# Patient Record
Sex: MALE | Race: WHITE | NOT HISPANIC OR LATINO
[De-identification: names, ages, dates, MRNs, and addresses within clinical notes are randomized per-mention and may not be internally consistent; named-entity substitution may affect disease eponyms.]

---

## 2017-02-02 ENCOUNTER — APPOINTMENT (OUTPATIENT)
Dept: SURGICAL ONCOLOGY | Facility: CLINIC | Age: 72
End: 2017-02-02

## 2017-02-02 VITALS
HEIGHT: 71 IN | WEIGHT: 182 LBS | HEART RATE: 59 BPM | DIASTOLIC BLOOD PRESSURE: 78 MMHG | RESPIRATION RATE: 16 BRPM | BODY MASS INDEX: 25.48 KG/M2 | SYSTOLIC BLOOD PRESSURE: 127 MMHG

## 2017-02-02 DIAGNOSIS — Z85.820 ENCOUNTER FOR FOLLOW-UP EXAMINATION AFTER COMPLETED TREATMENT FOR MALIGNANT NEOPLASM: ICD-10-CM

## 2017-02-02 DIAGNOSIS — Z08 ENCOUNTER FOR FOLLOW-UP EXAMINATION AFTER COMPLETED TREATMENT FOR MALIGNANT NEOPLASM: ICD-10-CM

## 2017-05-11 ENCOUNTER — APPOINTMENT (OUTPATIENT)
Dept: SURGICAL ONCOLOGY | Facility: CLINIC | Age: 72
End: 2017-05-11

## 2017-05-11 VITALS
SYSTOLIC BLOOD PRESSURE: 126 MMHG | WEIGHT: 182 LBS | BODY MASS INDEX: 25.48 KG/M2 | DIASTOLIC BLOOD PRESSURE: 79 MMHG | HEIGHT: 71 IN

## 2017-07-16 ENCOUNTER — FORM ENCOUNTER (OUTPATIENT)
Age: 72
End: 2017-07-16

## 2017-07-17 ENCOUNTER — APPOINTMENT (OUTPATIENT)
Dept: RADIOLOGY | Facility: IMAGING CENTER | Age: 72
End: 2017-07-17

## 2017-07-17 ENCOUNTER — OUTPATIENT (OUTPATIENT)
Dept: OUTPATIENT SERVICES | Facility: HOSPITAL | Age: 72
LOS: 1 days | End: 2017-07-17
Payer: MEDICARE

## 2017-07-17 DIAGNOSIS — C43.61 MALIGNANT MELANOMA OF RIGHT UPPER LIMB, INCLUDING SHOULDER: ICD-10-CM

## 2017-07-17 PROCEDURE — 71046 X-RAY EXAM CHEST 2 VIEWS: CPT

## 2017-10-12 ENCOUNTER — APPOINTMENT (OUTPATIENT)
Dept: SURGICAL ONCOLOGY | Facility: CLINIC | Age: 72
End: 2017-10-12

## 2018-02-09 ENCOUNTER — APPOINTMENT (OUTPATIENT)
Dept: PSYCHIATRY | Facility: CLINIC | Age: 73
End: 2018-02-09
Payer: MEDICARE

## 2018-02-09 PROCEDURE — 99205 OFFICE O/P NEW HI 60 MIN: CPT

## 2018-02-09 RX ORDER — ROSUVASTATIN CALCIUM 5 MG/1
5 TABLET, FILM COATED ORAL
Qty: 30 | Refills: 0 | Status: DISCONTINUED | COMMUNITY
Start: 2016-10-28

## 2018-02-09 RX ORDER — SODIUM SULFATE, POTASSIUM SULFATE, MAGNESIUM SULFATE 17.5; 3.13; 1.6 G/ML; G/ML; G/ML
17.5-3.13-1.6 SOLUTION, CONCENTRATE ORAL
Qty: 354 | Refills: 0 | Status: DISCONTINUED | COMMUNITY
Start: 2017-07-17

## 2018-02-09 RX ORDER — METOPROLOL SUCCINATE 50 MG/1
50 TABLET, EXTENDED RELEASE ORAL
Qty: 90 | Refills: 0 | Status: DISCONTINUED | COMMUNITY
Start: 2017-05-03

## 2018-02-09 RX ORDER — OSELTAMIVIR PHOSPHATE 75 MG/1
75 CAPSULE ORAL
Qty: 10 | Refills: 0 | Status: DISCONTINUED | COMMUNITY
Start: 2018-01-10

## 2018-02-09 RX ORDER — CIPROFLOXACIN HYDROCHLORIDE 500 MG/1
500 TABLET, FILM COATED ORAL
Qty: 20 | Refills: 0 | Status: DISCONTINUED | COMMUNITY
Start: 2018-01-19

## 2018-02-09 RX ORDER — MOMETASONE FUROATE 1 MG/ML
0.1 SOLUTION TOPICAL
Qty: 60 | Refills: 0 | Status: DISCONTINUED | COMMUNITY
Start: 2017-10-26

## 2018-02-27 ENCOUNTER — APPOINTMENT (OUTPATIENT)
Dept: PSYCHIATRY | Facility: CLINIC | Age: 73
End: 2018-02-27
Payer: MEDICARE

## 2018-02-27 PROCEDURE — 99214 OFFICE O/P EST MOD 30 MIN: CPT

## 2018-03-05 ENCOUNTER — RX RENEWAL (OUTPATIENT)
Age: 73
End: 2018-03-05

## 2018-03-06 ENCOUNTER — APPOINTMENT (OUTPATIENT)
Dept: PSYCHIATRY | Facility: CLINIC | Age: 73
End: 2018-03-06
Payer: MEDICARE

## 2018-03-06 PROCEDURE — 99214 OFFICE O/P EST MOD 30 MIN: CPT

## 2018-03-06 RX ORDER — DULOXETINE HYDROCHLORIDE 20 MG/1
20 CAPSULE, DELAYED RELEASE PELLETS ORAL
Qty: 90 | Refills: 0 | Status: DISCONTINUED | COMMUNITY
Start: 2018-02-09

## 2018-03-13 ENCOUNTER — APPOINTMENT (OUTPATIENT)
Dept: PSYCHIATRY | Facility: CLINIC | Age: 73
End: 2018-03-13

## 2018-03-13 RX ORDER — DULOXETINE HYDROCHLORIDE 60 MG/1
60 CAPSULE, DELAYED RELEASE PELLETS ORAL
Qty: 30 | Refills: 0 | Status: DISCONTINUED | COMMUNITY
Start: 2018-02-09 | End: 2018-03-13

## 2018-03-14 ENCOUNTER — APPOINTMENT (OUTPATIENT)
Dept: PSYCHIATRY | Facility: CLINIC | Age: 73
End: 2018-03-14
Payer: MEDICARE

## 2018-03-14 PROCEDURE — 99214 OFFICE O/P EST MOD 30 MIN: CPT

## 2018-03-14 RX ORDER — DULOXETINE HYDROCHLORIDE 20 MG/1
20 CAPSULE, DELAYED RELEASE PELLETS ORAL
Qty: 30 | Refills: 0 | Status: DISCONTINUED | COMMUNITY
Start: 2018-03-06 | End: 2018-03-14

## 2018-03-29 ENCOUNTER — APPOINTMENT (OUTPATIENT)
Dept: PSYCHIATRY | Facility: CLINIC | Age: 73
End: 2018-03-29
Payer: MEDICARE

## 2018-03-29 PROCEDURE — 99214 OFFICE O/P EST MOD 30 MIN: CPT

## 2018-04-17 ENCOUNTER — APPOINTMENT (OUTPATIENT)
Dept: SURGICAL ONCOLOGY | Facility: CLINIC | Age: 73
End: 2018-04-17
Payer: MEDICARE

## 2018-04-17 VITALS
WEIGHT: 176 LBS | DIASTOLIC BLOOD PRESSURE: 84 MMHG | SYSTOLIC BLOOD PRESSURE: 146 MMHG | OXYGEN SATURATION: 98 % | BODY MASS INDEX: 24.64 KG/M2 | HEART RATE: 70 BPM | HEIGHT: 71 IN

## 2018-04-17 PROCEDURE — 99214 OFFICE O/P EST MOD 30 MIN: CPT

## 2018-04-27 ENCOUNTER — RESULT REVIEW (OUTPATIENT)
Age: 73
End: 2018-04-27

## 2018-04-27 ENCOUNTER — OUTPATIENT (OUTPATIENT)
Dept: OUTPATIENT SERVICES | Facility: HOSPITAL | Age: 73
LOS: 1 days | End: 2018-04-27
Payer: MEDICARE

## 2018-04-27 DIAGNOSIS — C43.61 MALIGNANT MELANOMA OF RIGHT UPPER LIMB, INCLUDING SHOULDER: ICD-10-CM

## 2018-04-27 PROCEDURE — 88321 CONSLTJ&REPRT SLD PREP ELSWR: CPT

## 2018-04-30 LAB — SURGICAL PATHOLOGY STUDY: SIGNIFICANT CHANGE UP

## 2018-05-07 ENCOUNTER — OUTPATIENT (OUTPATIENT)
Dept: OUTPATIENT SERVICES | Facility: HOSPITAL | Age: 73
LOS: 1 days | End: 2018-05-07
Payer: MEDICARE

## 2018-05-07 VITALS
SYSTOLIC BLOOD PRESSURE: 107 MMHG | HEIGHT: 69.5 IN | TEMPERATURE: 98 F | DIASTOLIC BLOOD PRESSURE: 80 MMHG | WEIGHT: 173.06 LBS | HEART RATE: 68 BPM | RESPIRATION RATE: 18 BRPM

## 2018-05-07 DIAGNOSIS — C43.61 MALIGNANT MELANOMA OF RIGHT UPPER LIMB, INCLUDING SHOULDER: ICD-10-CM

## 2018-05-07 DIAGNOSIS — Z98.890 OTHER SPECIFIED POSTPROCEDURAL STATES: Chronic | ICD-10-CM

## 2018-05-07 DIAGNOSIS — R94.31 ABNORMAL ELECTROCARDIOGRAM [ECG] [EKG]: ICD-10-CM

## 2018-05-07 DIAGNOSIS — R06.83 SNORING: ICD-10-CM

## 2018-05-07 DIAGNOSIS — I10 ESSENTIAL (PRIMARY) HYPERTENSION: ICD-10-CM

## 2018-05-07 LAB
BLD GP AB SCN SERPL QL: NEGATIVE — SIGNIFICANT CHANGE UP
BUN SERPL-MCNC: 17 MG/DL — SIGNIFICANT CHANGE UP (ref 7–23)
CALCIUM SERPL-MCNC: 8.9 MG/DL — SIGNIFICANT CHANGE UP (ref 8.4–10.5)
CHLORIDE SERPL-SCNC: 103 MMOL/L — SIGNIFICANT CHANGE UP (ref 98–107)
CO2 SERPL-SCNC: 30 MMOL/L — SIGNIFICANT CHANGE UP (ref 22–31)
CREAT SERPL-MCNC: 0.81 MG/DL — SIGNIFICANT CHANGE UP (ref 0.5–1.3)
GLUCOSE SERPL-MCNC: 77 MG/DL — SIGNIFICANT CHANGE UP (ref 70–99)
HCT VFR BLD CALC: 40.3 % — SIGNIFICANT CHANGE UP (ref 39–50)
HGB BLD-MCNC: 14.2 G/DL — SIGNIFICANT CHANGE UP (ref 13–17)
MCHC RBC-ENTMCNC: 33.1 PG — SIGNIFICANT CHANGE UP (ref 27–34)
MCHC RBC-ENTMCNC: 35.2 % — SIGNIFICANT CHANGE UP (ref 32–36)
MCV RBC AUTO: 93.9 FL — SIGNIFICANT CHANGE UP (ref 80–100)
NRBC # FLD: 0 — SIGNIFICANT CHANGE UP
PLATELET # BLD AUTO: 219 K/UL — SIGNIFICANT CHANGE UP (ref 150–400)
PMV BLD: 9.9 FL — SIGNIFICANT CHANGE UP (ref 7–13)
POTASSIUM SERPL-MCNC: 4.4 MMOL/L — SIGNIFICANT CHANGE UP (ref 3.5–5.3)
POTASSIUM SERPL-SCNC: 4.4 MMOL/L — SIGNIFICANT CHANGE UP (ref 3.5–5.3)
RBC # BLD: 4.29 M/UL — SIGNIFICANT CHANGE UP (ref 4.2–5.8)
RBC # FLD: 14.6 % — HIGH (ref 10.3–14.5)
RH IG SCN BLD-IMP: POSITIVE — SIGNIFICANT CHANGE UP
SODIUM SERPL-SCNC: 144 MMOL/L — SIGNIFICANT CHANGE UP (ref 135–145)
WBC # BLD: 5.32 K/UL — SIGNIFICANT CHANGE UP (ref 3.8–10.5)
WBC # FLD AUTO: 5.32 K/UL — SIGNIFICANT CHANGE UP (ref 3.8–10.5)

## 2018-05-07 PROCEDURE — 93010 ELECTROCARDIOGRAM REPORT: CPT

## 2018-05-07 RX ORDER — SODIUM CHLORIDE 9 MG/ML
1000 INJECTION, SOLUTION INTRAVENOUS
Qty: 0 | Refills: 0 | Status: DISCONTINUED | OUTPATIENT
Start: 2018-05-09 | End: 2018-05-24

## 2018-05-07 RX ORDER — SODIUM CHLORIDE 9 MG/ML
3 INJECTION INTRAMUSCULAR; INTRAVENOUS; SUBCUTANEOUS EVERY 8 HOURS
Qty: 0 | Refills: 0 | Status: DISCONTINUED | OUTPATIENT
Start: 2018-05-09 | End: 2018-05-24

## 2018-05-07 NOTE — H&P PST ADULT - PSH
H/O melanoma excision  right shoulder in 2015  H/O needle biopsy  of prostate  S/P wrist surgery  left wrist, 30 years ago with hardware placement and excision of hardware months later

## 2018-05-07 NOTE — H&P PST ADULT - HISTORY OF PRESENT ILLNESS
72 y/ male with PMH of HTN, HLD and Situational Anxiety presents to PST for preoperative evaluation with dx of malignant melanoma of right upper limb, including shoulder. s/p resection of right shoulder melanoma in 2015. During a follow up visit in April, a lesion was noted next to the previous excision site. s/p biopsy which revealed recurrence of melanoma. Scheduled for Wide Excision Melanoma Right Shoulder, Right [place] Lymph Node Biopsy on 5/9/2018. 72 y/ male with PMH of HTN, HLD and Situational Anxiety presents to PST for preoperative evaluation with dx of malignant melanoma of right upper limb, including shoulder. s/p resection of right shoulder melanoma in 2015. During a follow up visit in April, a lesion was noted next to the previous excision site. s/p biopsy which revealed recurrence of malignant melanoma. Scheduled for Wide Excision Melanoma Right Shoulder, Right [place] Lymph Node Biopsy on 5/9/2018.

## 2018-05-07 NOTE — H&P PST ADULT - PROBLEM SELECTOR PLAN 1
Scheduled for Wide Excision Melanoma Right Shoulder, Right [place] Lymph Node Biopsy on 5/9/2018.   Preop instructions given, pt verbalized understanding   Chlorhexidine wash and GI prophylaxis provided

## 2018-05-07 NOTE — H&P PST ADULT - PMH
Basal cell carcinoma  right back  Former smoker    HLD (hyperlipidemia)    HTN (hypertension)    Malignant melanoma of right upper limb, including shoulder    Situational anxiety

## 2018-05-07 NOTE — H&P PST ADULT - NEGATIVE CARDIOVASCULAR SYMPTOMS
no palpitations/no orthopnea/no chest pain/no peripheral edema/no paroxysmal nocturnal dyspnea/no claudication/no dyspnea on exertion

## 2018-05-07 NOTE — H&P PST ADULT - NEGATIVE ENMT SYMPTOMS
no hearing difficulty/no dysphagia/no nose bleeds/no gum bleeding/no ear pain/no tinnitus/no vertigo/no post-nasal discharge

## 2018-05-07 NOTE — H&P PST ADULT - FAMILY HISTORY
Father  Still living? Unknown  Family history of heart attack, Age at diagnosis: Age Unknown     Mother  Still living? No  Family history of renal failure, Age at diagnosis: Age Unknown

## 2018-05-07 NOTE — H&P PST ADULT - NEGATIVE GENERAL SYMPTOMS
no weight gain/no polyphagia/no polyuria/no fever/no weight loss/no polydipsia/no fatigue/no sweating/no chills

## 2018-05-07 NOTE — H&P PST ADULT - NSANTHOSAYNRD_GEN_A_CORE
No. OLIVER screening performed.  STOP BANG Legend: 0-2 = LOW Risk; 3-4 = INTERMEDIATE Risk; 5-8 = HIGH Risk

## 2018-05-08 ENCOUNTER — FORM ENCOUNTER (OUTPATIENT)
Age: 73
End: 2018-05-08

## 2018-05-09 ENCOUNTER — OUTPATIENT (OUTPATIENT)
Dept: OUTPATIENT SERVICES | Facility: HOSPITAL | Age: 73
LOS: 1 days | Discharge: HOME | End: 2018-05-09
Payer: MEDICARE

## 2018-05-09 ENCOUNTER — RESULT REVIEW (OUTPATIENT)
Age: 73
End: 2018-05-09

## 2018-05-09 ENCOUNTER — APPOINTMENT (OUTPATIENT)
Dept: NUCLEAR MEDICINE | Facility: HOSPITAL | Age: 73
End: 2018-05-09

## 2018-05-09 ENCOUNTER — APPOINTMENT (OUTPATIENT)
Dept: SURGICAL ONCOLOGY | Facility: HOSPITAL | Age: 73
End: 2018-05-09

## 2018-05-09 VITALS
HEART RATE: 76 BPM | DIASTOLIC BLOOD PRESSURE: 72 MMHG | OXYGEN SATURATION: 98 % | RESPIRATION RATE: 14 BRPM | TEMPERATURE: 97 F | SYSTOLIC BLOOD PRESSURE: 139 MMHG

## 2018-05-09 VITALS
DIASTOLIC BLOOD PRESSURE: 67 MMHG | TEMPERATURE: 98 F | OXYGEN SATURATION: 97 % | HEART RATE: 68 BPM | SYSTOLIC BLOOD PRESSURE: 148 MMHG | WEIGHT: 173.06 LBS | HEIGHT: 69.5 IN | RESPIRATION RATE: 16 BRPM

## 2018-05-09 DIAGNOSIS — C43.61 MALIGNANT MELANOMA OF RIGHT UPPER LIMB, INCLUDING SHOULDER: ICD-10-CM

## 2018-05-09 DIAGNOSIS — Z98.890 OTHER SPECIFIED POSTPROCEDURAL STATES: Chronic | ICD-10-CM

## 2018-05-09 LAB — RH IG SCN BLD-IMP: POSITIVE — SIGNIFICANT CHANGE UP

## 2018-05-09 PROCEDURE — 88307 TISSUE EXAM BY PATHOLOGIST: CPT | Mod: 26

## 2018-05-09 PROCEDURE — 78195 LYMPH SYSTEM IMAGING: CPT | Mod: 26

## 2018-05-09 PROCEDURE — 88341 IMHCHEM/IMCYTCHM EA ADD ANTB: CPT | Mod: 26

## 2018-05-09 PROCEDURE — 88305 TISSUE EXAM BY PATHOLOGIST: CPT | Mod: 26

## 2018-05-09 PROCEDURE — 11606 EXC TR-EXT MAL+MARG >4 CM: CPT

## 2018-05-09 PROCEDURE — 88342 IMHCHEM/IMCYTCHM 1ST ANTB: CPT | Mod: 26

## 2018-05-09 PROCEDURE — 38525 BIOPSY/REMOVAL LYMPH NODES: CPT

## 2018-05-09 RX ORDER — CEPHALEXIN 500 MG
1 CAPSULE ORAL
Qty: 14 | Refills: 0
Start: 2018-05-09 | End: 2018-05-15

## 2018-05-09 RX ORDER — FENTANYL CITRATE 50 UG/ML
25 INJECTION INTRAVENOUS
Qty: 0 | Refills: 0 | Status: DISCONTINUED | OUTPATIENT
Start: 2018-05-09 | End: 2018-05-09

## 2018-05-09 RX ORDER — SODIUM CHLORIDE 9 MG/ML
500 INJECTION, SOLUTION INTRAVENOUS ONCE
Qty: 0 | Refills: 0 | Status: COMPLETED | OUTPATIENT
Start: 2018-05-09 | End: 2018-05-09

## 2018-05-09 RX ORDER — ONDANSETRON 8 MG/1
4 TABLET, FILM COATED ORAL ONCE
Qty: 0 | Refills: 0 | Status: COMPLETED | OUTPATIENT
Start: 2018-05-09 | End: 2018-05-09

## 2018-05-09 RX ORDER — DEXAMETHASONE 0.5 MG/5ML
6 ELIXIR ORAL ONCE
Qty: 0 | Refills: 0 | Status: COMPLETED | OUTPATIENT
Start: 2018-05-09 | End: 2018-05-09

## 2018-05-09 RX ORDER — ACETAMINOPHEN 500 MG
1000 TABLET ORAL ONCE
Qty: 0 | Refills: 0 | Status: COMPLETED | OUTPATIENT
Start: 2018-05-09 | End: 2018-05-09

## 2018-05-09 RX ADMIN — Medication 400 MILLIGRAM(S): at 15:35

## 2018-05-09 RX ADMIN — Medication 1000 MILLIGRAM(S): at 15:49

## 2018-05-09 RX ADMIN — SODIUM CHLORIDE 3000 MILLILITER(S): 9 INJECTION, SOLUTION INTRAVENOUS at 17:05

## 2018-05-09 RX ADMIN — ONDANSETRON 4 MILLIGRAM(S): 8 TABLET, FILM COATED ORAL at 16:37

## 2018-05-09 RX ADMIN — Medication 6 MILLIGRAM(S): at 16:04

## 2018-05-09 RX ADMIN — SODIUM CHLORIDE 75 MILLILITER(S): 9 INJECTION, SOLUTION INTRAVENOUS at 15:36

## 2018-05-09 NOTE — BRIEF OPERATIVE NOTE - OPERATION/FINDINGS
Right axillary sentinel lymph node biopsy performed. Right shoulder melanoma excision. Reconstruction by plastics.
See gen surg note for excision portion. Reconstruction of R shoulder melanoma excision site with split thickness skin graft from R thigh to R shoulder.

## 2018-05-09 NOTE — ASU DISCHARGE PLAN (ADULT/PEDIATRIC). - NOTIFY
Bleeding that does not stop/Swelling that continues Bleeding that does not stop/Swelling that continues/Persistent Nausea and Vomiting/Pain not relieved by Medications/Fever greater than 101

## 2018-05-09 NOTE — BRIEF OPERATIVE NOTE - PROCEDURE
<<-----Click on this checkbox to enter Procedure Excision of melanoma of shoulder  05/09/2018  R  Active  [id number] Biopsy of sentinel axillary lymph node  05/09/2018  R  Active  [id number]

## 2018-05-09 NOTE — ASU DISCHARGE PLAN (ADULT/PEDIATRIC). - MEDICATION SUMMARY - MEDICATIONS TO TAKE
I will START or STAY ON the medications listed below when I get home from the hospital:    Vitamin D3  -- 1 tab(s) by mouth once a day am  -- Indication: For supplement    oxyCODONE-acetaminophen 5 mg-325 mg oral tablet  -- 1 tab(s) by mouth every 4 hours, As Needed  -for moderate pain MDD:6   -- Caution federal law prohibits the transfer of this drug to any person other  than the person for whom it was prescribed.  May cause drowsiness.  Alcohol may intensify this effect.  Use care when operating dangerous machinery.  This prescription cannot be refilled.  This product contains acetaminophen.  Do not use  with any other product containing acetaminophen to prevent possible liver damage.  Using more of this medication than prescribed may cause serious breathing problems.    -- Indication: For pain    aspirin 81 mg oral tablet  -- 1 tab(s) by mouth once a day am 5/2/18  -- Indication: For anti-platelet    KlonoPIN 0.5 mg oral tablet  -- orally 2 times a day  -- Indication: For anxiety    Remeron 45 mg oral tablet  -- 1 tab(s) by mouth once a day (at bedtime)  -- Indication: For depression/sleep aid/appetite aid    Crestor 5 mg oral tablet  -- 1 tab(s) by mouth once a day (at bedtime)  -- Indication: For cholesterol    SEROquel 50 mg oral tablet  -- orally once a day (at bedtime)  -- Indication: For depression/sleep aid    Ambien 5 mg oral tablet  -- 1 tab(s) by mouth once a day (at bedtime)  -- Indication: For anxiety    Toprol-XL 50 mg oral tablet, extended release  -- 1 tab(s) by mouth once a day am  -- Indication: For blood pressure    Norvasc 5 mg oral tablet  -- 1 tab(s) by mouth once a day am  -- Indication: For blood pressure    Keflex 500 mg oral capsule  -- 1 cap(s) by mouth every 12 hours x 7 days   -- Finish all this medication unless otherwise directed by prescriber.    -- Indication: For antibiotics    Centrum Silver Men's oral tablet  -- 1 tab(s) by mouth once a day /last dose 5/2/18  -- Indication: For supplement

## 2018-05-09 NOTE — ASU PREOP CHECKLIST - COMMENTS
norvasc, metoprolol, famotidine in AM with sip of water Norvasc, metoprolol, famotidine in AM with sip of water

## 2018-05-09 NOTE — BRIEF OPERATIVE NOTE - PROCEDURE
<<-----Click on this checkbox to enter Procedure Skin graft, split-thickness  05/09/2018  to right shoulder from right thigh  Active  [id number]

## 2018-05-10 ENCOUNTER — TRANSCRIPTION ENCOUNTER (OUTPATIENT)
Age: 73
End: 2018-05-10

## 2018-05-15 LAB — SURGICAL PATHOLOGY STUDY: SIGNIFICANT CHANGE UP

## 2018-05-22 ENCOUNTER — APPOINTMENT (OUTPATIENT)
Dept: SURGICAL ONCOLOGY | Facility: CLINIC | Age: 73
End: 2018-05-22
Payer: MEDICARE

## 2018-05-22 VITALS
DIASTOLIC BLOOD PRESSURE: 90 MMHG | SYSTOLIC BLOOD PRESSURE: 155 MMHG | HEIGHT: 71 IN | WEIGHT: 170 LBS | HEART RATE: 80 BPM | RESPIRATION RATE: 15 BRPM | BODY MASS INDEX: 23.8 KG/M2

## 2018-05-22 PROCEDURE — 99024 POSTOP FOLLOW-UP VISIT: CPT

## 2018-05-24 ENCOUNTER — APPOINTMENT (OUTPATIENT)
Dept: PSYCHIATRY | Facility: CLINIC | Age: 73
End: 2018-05-24
Payer: MEDICARE

## 2018-05-24 PROCEDURE — 99214 OFFICE O/P EST MOD 30 MIN: CPT

## 2018-08-21 ENCOUNTER — APPOINTMENT (OUTPATIENT)
Dept: SURGICAL ONCOLOGY | Facility: CLINIC | Age: 73
End: 2018-08-21
Payer: MEDICARE

## 2018-08-21 VITALS
SYSTOLIC BLOOD PRESSURE: 135 MMHG | HEART RATE: 71 BPM | RESPIRATION RATE: 16 BRPM | DIASTOLIC BLOOD PRESSURE: 73 MMHG | HEIGHT: 71 IN | BODY MASS INDEX: 24.22 KG/M2 | WEIGHT: 173 LBS

## 2018-08-21 PROCEDURE — 99213 OFFICE O/P EST LOW 20 MIN: CPT

## 2018-09-10 ENCOUNTER — RX RENEWAL (OUTPATIENT)
Age: 73
End: 2018-09-10

## 2018-09-12 ENCOUNTER — APPOINTMENT (OUTPATIENT)
Dept: PSYCHIATRY | Facility: CLINIC | Age: 73
End: 2018-09-12
Payer: MEDICARE

## 2018-09-12 PROCEDURE — 99214 OFFICE O/P EST MOD 30 MIN: CPT

## 2018-09-12 RX ORDER — MIRTAZAPINE 30 MG/1
30 TABLET, FILM COATED ORAL
Qty: 30 | Refills: 0 | Status: DISCONTINUED | COMMUNITY
Start: 2018-03-06 | End: 2018-09-12

## 2018-09-13 PROBLEM — I10 ESSENTIAL (PRIMARY) HYPERTENSION: Chronic | Status: ACTIVE | Noted: 2018-05-07

## 2018-09-13 PROBLEM — E78.5 HYPERLIPIDEMIA, UNSPECIFIED: Chronic | Status: ACTIVE | Noted: 2018-05-07

## 2018-09-13 PROBLEM — F41.8 OTHER SPECIFIED ANXIETY DISORDERS: Chronic | Status: ACTIVE | Noted: 2018-05-07

## 2018-09-13 PROBLEM — Z87.891 PERSONAL HISTORY OF NICOTINE DEPENDENCE: Chronic | Status: ACTIVE | Noted: 2018-05-07

## 2018-12-05 ENCOUNTER — APPOINTMENT (OUTPATIENT)
Dept: PSYCHIATRY | Facility: CLINIC | Age: 73
End: 2018-12-05
Payer: MEDICARE

## 2018-12-05 PROCEDURE — 99214 OFFICE O/P EST MOD 30 MIN: CPT

## 2019-02-14 ENCOUNTER — RX RENEWAL (OUTPATIENT)
Age: 74
End: 2019-02-14

## 2019-03-04 ENCOUNTER — TRANSCRIPTION ENCOUNTER (OUTPATIENT)
Age: 74
End: 2019-03-04

## 2019-03-07 ENCOUNTER — APPOINTMENT (OUTPATIENT)
Dept: PSYCHIATRY | Facility: CLINIC | Age: 74
End: 2019-03-07
Payer: MEDICARE

## 2019-03-07 PROCEDURE — 99214 OFFICE O/P EST MOD 30 MIN: CPT

## 2019-04-16 ENCOUNTER — RX RENEWAL (OUTPATIENT)
Age: 74
End: 2019-04-16

## 2019-05-30 ENCOUNTER — APPOINTMENT (OUTPATIENT)
Dept: PSYCHIATRY | Facility: CLINIC | Age: 74
End: 2019-05-30
Payer: MEDICARE

## 2019-05-30 PROCEDURE — 99214 OFFICE O/P EST MOD 30 MIN: CPT

## 2019-08-29 ENCOUNTER — APPOINTMENT (OUTPATIENT)
Dept: PSYCHIATRY | Facility: CLINIC | Age: 74
End: 2019-08-29
Payer: MEDICARE

## 2019-08-29 PROCEDURE — 99214 OFFICE O/P EST MOD 30 MIN: CPT

## 2019-08-29 RX ORDER — ZOLPIDEM TARTRATE 10 MG/1
10 TABLET ORAL
Qty: 30 | Refills: 0 | Status: DISCONTINUED | COMMUNITY
Start: 2017-06-20 | End: 2019-08-29

## 2019-12-12 ENCOUNTER — APPOINTMENT (OUTPATIENT)
Dept: PSYCHIATRY | Facility: CLINIC | Age: 74
End: 2019-12-12
Payer: MEDICARE

## 2019-12-12 PROCEDURE — 99214 OFFICE O/P EST MOD 30 MIN: CPT

## 2020-04-09 ENCOUNTER — APPOINTMENT (OUTPATIENT)
Dept: PSYCHIATRY | Facility: CLINIC | Age: 75
End: 2020-04-09
Payer: MEDICARE

## 2020-04-09 PROCEDURE — 99442: CPT

## 2020-07-09 ENCOUNTER — APPOINTMENT (OUTPATIENT)
Dept: PSYCHIATRY | Facility: CLINIC | Age: 75
End: 2020-07-09
Payer: MEDICARE

## 2020-07-09 PROCEDURE — 99214 OFFICE O/P EST MOD 30 MIN: CPT

## 2020-10-08 ENCOUNTER — APPOINTMENT (OUTPATIENT)
Dept: PSYCHIATRY | Facility: CLINIC | Age: 75
End: 2020-10-08
Payer: MEDICARE

## 2020-10-08 PROCEDURE — 99214 OFFICE O/P EST MOD 30 MIN: CPT

## 2020-10-08 RX ORDER — MIRTAZAPINE 45 MG/1
45 TABLET, FILM COATED ORAL
Qty: 90 | Refills: 1 | Status: ACTIVE | COMMUNITY
Start: 2018-03-06 | End: 1900-01-01

## 2020-10-21 ENCOUNTER — APPOINTMENT (OUTPATIENT)
Dept: UROLOGY | Facility: CLINIC | Age: 75
End: 2020-10-21
Payer: MEDICARE

## 2020-10-21 VITALS
BODY MASS INDEX: 25.2 KG/M2 | HEIGHT: 71 IN | WEIGHT: 180 LBS | SYSTOLIC BLOOD PRESSURE: 158 MMHG | DIASTOLIC BLOOD PRESSURE: 86 MMHG | HEART RATE: 73 BPM | RESPIRATION RATE: 17 BRPM

## 2020-10-21 VITALS — TEMPERATURE: 97.7 F

## 2020-10-21 PROCEDURE — 99214 OFFICE O/P EST MOD 30 MIN: CPT

## 2020-10-21 NOTE — HISTORY OF PRESENT ILLNESS
[None] : no symptoms [FreeTextEntry1] : Being followed for a cyst for a few years. Ultrasound done in June for the cyst 2.2 x 2.5x 1.7 cm solid appearing  mass in the upper midpole of left kidney . This is confirmed by CT 1.8 x 2.1 x 2.0 cm .

## 2020-10-21 NOTE — PHYSICAL EXAM
[General Appearance - Well Developed] : well developed [General Appearance - Well Nourished] : well nourished [Heart Rate And Rhythm] : Heart rate and rhythm were normal [] : no respiratory distress [Bowel Sounds] : normal bowel sounds [Abdomen Soft] : soft [Normal Station and Gait] : the gait and station were normal for the patient's age [Skin Color & Pigmentation] : normal skin color and pigmentation [Skin Turgor] : supple [No Focal Deficits] : no focal deficits [Oriented To Time, Place, And Person] : oriented to person, place, and time [No Palpable Adenopathy] : no palpable adenopathy

## 2020-10-21 NOTE — ASSESSMENT
[FreeTextEntry1] : renal mass\par We reviewed the images and reports . We reviewed the possibe underlying histology of solid enhancing renal masses . We discussed the risk of malignancy  vs benign . We discussed the possibility/ role of percutaneous biopsy ,with the associated risks, benefits , complications and accuracy issues ( risk of false negative). \par The natural history and biology of renal cell carcinoma was discussed. \par Options were reviewed including ,not limited to,active surveillance, surgical extirpation and ablation . The risk of tumor growth and metastasis on active surveillance were reviewed .\par Options were reviewed including , not limited to,active surveillance ( AS ) surgical extirpation and ablation. The risk of tumor growth and metastasis on AS could mean missing the opportunity for cure was discussed \par With respect to treatment we reviewed  ablation ( cryoablation, radiofrequency ablation) risks of recurrence,opportunities for salvage treatment and imaging requirements for follow up based on he pathological findings . Oncologic outcomes for ablation were reviewed .\par With respect for surgery we reviewed nephron sparing surgery vs radical nephrectomy  as well as minimally invasive laparoscopic  approach surgery and the possibility of converting to an open procedure . \par Risks of surgical complications were reviewed, including but not limited to : bleeding/ life threatening hemorrhage, vascular/bowel/adjacent visceral organ injury,trocar access injury, the possibility of recognized vs unrecognized delayed recognition injury, risks of thermal /blunt/sharp/retraction injury. We reviewed the risk of DVT, PE, MI ,death,risks of cardiopulmonary/anesthesia related complications,positional injury,infection/collection/abscess,wound complications/dehiscence,urinoma/fistula, ureteral injury/obstruction as well as other complications \par He is very anxious and his wife is on the phone and recommending waiting until they return from [place] in April \par But he has history of melanoma in 2019,2018 Due to this we strongly recommend biopsy \par

## 2020-10-23 ENCOUNTER — NON-APPOINTMENT (OUTPATIENT)
Age: 75
End: 2020-10-23

## 2020-10-23 RX ORDER — DIAZEPAM 5 MG/1
5 TABLET ORAL
Qty: 1 | Refills: 0 | Status: ACTIVE | COMMUNITY
Start: 2020-10-23 | End: 1900-01-01

## 2020-11-06 ENCOUNTER — APPOINTMENT (OUTPATIENT)
Dept: UROLOGY | Facility: CLINIC | Age: 75
End: 2020-11-06
Payer: MEDICARE

## 2020-11-06 ENCOUNTER — OUTPATIENT (OUTPATIENT)
Dept: OUTPATIENT SERVICES | Facility: HOSPITAL | Age: 75
LOS: 1 days | End: 2020-11-06
Payer: MEDICARE

## 2020-11-06 VITALS
BODY MASS INDEX: 25.2 KG/M2 | WEIGHT: 180 LBS | SYSTOLIC BLOOD PRESSURE: 133 MMHG | TEMPERATURE: 97.4 F | RESPIRATION RATE: 17 BRPM | HEIGHT: 71 IN | DIASTOLIC BLOOD PRESSURE: 81 MMHG | HEART RATE: 69 BPM

## 2020-11-06 VITALS — TEMPERATURE: 97.4 F

## 2020-11-06 DIAGNOSIS — R35.0 FREQUENCY OF MICTURITION: ICD-10-CM

## 2020-11-06 DIAGNOSIS — Z98.890 OTHER SPECIFIED POSTPROCEDURAL STATES: Chronic | ICD-10-CM

## 2020-11-06 DIAGNOSIS — N28.89 OTHER SPECIFIED DISORDERS OF KIDNEY AND URETER: ICD-10-CM

## 2020-11-06 PROCEDURE — 76942 ECHO GUIDE FOR BIOPSY: CPT

## 2020-11-06 PROCEDURE — 50200 RENAL BIOPSY PERQ: CPT

## 2020-11-06 PROCEDURE — 76942 ECHO GUIDE FOR BIOPSY: CPT | Mod: 26

## 2020-11-06 NOTE — PROCEDURE
[Kidney Biopsy] : kidney biopsy was performed [Patient] : the patient [Consent Obtained] : written consent was obtained prior to the procedure and is detailed in the patient's record [Time Started: ___] : Procedure Start Time: [unfilled] [Left Kidney] : left kidney [Betadine] : with betadine solution [Time Completed: ___] : Procedure Completion Time: [unfilled] [2%] : 2% [Tolerated Well] : the patient tolerated the procedure well [No Complications] : no complications - verified by ultrasound post procedure

## 2020-11-12 LAB — CORE LAB BIOPSY: NORMAL

## 2020-11-13 ENCOUNTER — APPOINTMENT (OUTPATIENT)
Dept: UROLOGY | Facility: CLINIC | Age: 75
End: 2020-11-13
Payer: MEDICARE

## 2020-11-13 VITALS — TEMPERATURE: 97.3 F

## 2020-11-13 PROCEDURE — 99214 OFFICE O/P EST MOD 30 MIN: CPT

## 2020-11-13 NOTE — HISTORY OF PRESENT ILLNESS
[FreeTextEntry1] : 75M Being followed for a cyst for a few years. Ultrasound done in June for the cyst 2.2 x 2.5x 1.7 cm solid appearing mass in the upper midpole of left kidney . This is confirmed by CT 1.8 x 2.1 x 2.0 cm .  \par \par Patient is currently experiencing no symptoms. 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

## 2020-11-13 NOTE — ASSESSMENT
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 We reviewed the possibe underlying histology of solid enhancing renal masses . We discussed the risk of malignancy  vs benign . We discussed the possibility/ role of percutaneous biopsy ,with the associated risks, benefits , complications and accuracy issues ( risk of false negative). \par The natural history and biology of renal cell carcinoma was discussed. \par Options were reviewed including ,not limited to,active surveillance, surgical extirpation and ablation . The risk of tumor growth and metastasis on active surveillance were reviewed .\par Options were reviewed including , not limited to,active surveillance ( AS ) surgical extirpation and ablation. The risk of tumor growth and metastasis on AS could mean missing the opportunity for cure was discussed \par With respect to treatment we reviewed  ablation ( cryoablation, radiofrequency ablation) risks of recurrence,opportunities for salvage treatment and imaging requirements for follow up based on he pathological findings . Oncologic outcomes for ablation were reviewed .\par With respect for surgery we reviewed nephron sparing surgery vs radical nephrectomy  as well as minimally invasive laparoscopic  approach surgery and the possibility of converting to an open procedure . \par Risks of surgical complications were reviewed, including but not limited to : bleeding/ life threatening hemorrhage, vascular/bowel/adjacent visceral organ injury,trocar access injury, the possibility of recognized vs unrecognized delayed recognition injury, risks of thermal /blunt/sharp/retraction injury. We reviewed the risk of DVT, PE, MI ,death,risks of cardiopulmonary/anesthesia related complications,positional injury,infection/collection/abscess,wound complications/dehiscence,urinoma/fistula, ureteral injury/obstruction as well as other complications \par Written materials regarding preop clear liquid diet and bowel prep were given to patient . \par Booking form entered

## 2020-11-19 ENCOUNTER — OUTPATIENT (OUTPATIENT)
Dept: OUTPATIENT SERVICES | Facility: HOSPITAL | Age: 75
LOS: 1 days | End: 2020-11-19
Payer: MEDICARE

## 2020-11-19 VITALS
HEIGHT: 70 IN | OXYGEN SATURATION: 99 % | TEMPERATURE: 96 F | WEIGHT: 184.09 LBS | HEART RATE: 75 BPM | SYSTOLIC BLOOD PRESSURE: 139 MMHG | DIASTOLIC BLOOD PRESSURE: 76 MMHG | RESPIRATION RATE: 15 BRPM

## 2020-11-19 DIAGNOSIS — N28.89 OTHER SPECIFIED DISORDERS OF KIDNEY AND URETER: ICD-10-CM

## 2020-11-19 DIAGNOSIS — F32.9 MAJOR DEPRESSIVE DISORDER, SINGLE EPISODE, UNSPECIFIED: ICD-10-CM

## 2020-11-19 DIAGNOSIS — Z01.818 ENCOUNTER FOR OTHER PREPROCEDURAL EXAMINATION: ICD-10-CM

## 2020-11-19 DIAGNOSIS — E03.9 HYPOTHYROIDISM, UNSPECIFIED: ICD-10-CM

## 2020-11-19 DIAGNOSIS — Z98.890 OTHER SPECIFIED POSTPROCEDURAL STATES: Chronic | ICD-10-CM

## 2020-11-19 DIAGNOSIS — G47.33 OBSTRUCTIVE SLEEP APNEA (ADULT) (PEDIATRIC): ICD-10-CM

## 2020-11-19 DIAGNOSIS — C64.2 MALIGNANT NEOPLASM OF LEFT KIDNEY, EXCEPT RENAL PELVIS: ICD-10-CM

## 2020-11-19 LAB
ALBUMIN SERPL ELPH-MCNC: 4.4 G/DL — SIGNIFICANT CHANGE UP (ref 3.3–5)
ALP SERPL-CCNC: 49 U/L — SIGNIFICANT CHANGE UP (ref 40–120)
ALT FLD-CCNC: 16 U/L — SIGNIFICANT CHANGE UP (ref 4–41)
ANION GAP SERPL CALC-SCNC: 10 MMO/L — SIGNIFICANT CHANGE UP (ref 7–14)
AST SERPL-CCNC: 15 U/L — SIGNIFICANT CHANGE UP (ref 4–40)
BILIRUB SERPL-MCNC: 0.3 MG/DL — SIGNIFICANT CHANGE UP (ref 0.2–1.2)
BLD GP AB SCN SERPL QL: NEGATIVE — SIGNIFICANT CHANGE UP
BUN SERPL-MCNC: 16 MG/DL — SIGNIFICANT CHANGE UP (ref 7–23)
CALCIUM SERPL-MCNC: 9.3 MG/DL — SIGNIFICANT CHANGE UP (ref 8.4–10.5)
CHLORIDE SERPL-SCNC: 104 MMOL/L — SIGNIFICANT CHANGE UP (ref 98–107)
CO2 SERPL-SCNC: 27 MMOL/L — SIGNIFICANT CHANGE UP (ref 22–31)
CREAT SERPL-MCNC: 0.86 MG/DL — SIGNIFICANT CHANGE UP (ref 0.5–1.3)
GLUCOSE SERPL-MCNC: 91 MG/DL — SIGNIFICANT CHANGE UP (ref 70–99)
HCT VFR BLD CALC: 44 % — SIGNIFICANT CHANGE UP (ref 39–50)
HGB BLD-MCNC: 13.7 G/DL — SIGNIFICANT CHANGE UP (ref 13–17)
MCHC RBC-ENTMCNC: 29.1 PG — SIGNIFICANT CHANGE UP (ref 27–34)
MCHC RBC-ENTMCNC: 31.1 % — LOW (ref 32–36)
MCV RBC AUTO: 93.4 FL — SIGNIFICANT CHANGE UP (ref 80–100)
NRBC # FLD: 0 K/UL — SIGNIFICANT CHANGE UP (ref 0–0)
PLATELET # BLD AUTO: 211 K/UL — SIGNIFICANT CHANGE UP (ref 150–400)
PMV BLD: 10.2 FL — SIGNIFICANT CHANGE UP (ref 7–13)
POTASSIUM SERPL-MCNC: 4.1 MMOL/L — SIGNIFICANT CHANGE UP (ref 3.5–5.3)
POTASSIUM SERPL-SCNC: 4.1 MMOL/L — SIGNIFICANT CHANGE UP (ref 3.5–5.3)
PROT SERPL-MCNC: 7.2 G/DL — SIGNIFICANT CHANGE UP (ref 6–8.3)
RBC # BLD: 4.71 M/UL — SIGNIFICANT CHANGE UP (ref 4.2–5.8)
RBC # FLD: 14.1 % — SIGNIFICANT CHANGE UP (ref 10.3–14.5)
RH IG SCN BLD-IMP: POSITIVE — SIGNIFICANT CHANGE UP
SODIUM SERPL-SCNC: 141 MMOL/L — SIGNIFICANT CHANGE UP (ref 135–145)
WBC # BLD: 5.82 K/UL — SIGNIFICANT CHANGE UP (ref 3.8–10.5)
WBC # FLD AUTO: 5.82 K/UL — SIGNIFICANT CHANGE UP (ref 3.8–10.5)

## 2020-11-19 PROCEDURE — 93010 ELECTROCARDIOGRAM REPORT: CPT

## 2020-11-19 RX ORDER — ASPIRIN/CALCIUM CARB/MAGNESIUM 324 MG
1 TABLET ORAL
Qty: 0 | Refills: 0 | DISCHARGE

## 2020-11-19 RX ORDER — QUETIAPINE FUMARATE 200 MG/1
0 TABLET, FILM COATED ORAL
Qty: 0 | Refills: 0 | DISCHARGE

## 2020-11-19 RX ORDER — CLONAZEPAM 1 MG
0 TABLET ORAL
Qty: 0 | Refills: 0 | DISCHARGE

## 2020-11-19 RX ORDER — AMLODIPINE BESYLATE 2.5 MG/1
1 TABLET ORAL
Qty: 0 | Refills: 0 | DISCHARGE

## 2020-11-19 RX ORDER — CHOLECALCIFEROL (VITAMIN D3) 125 MCG
1 CAPSULE ORAL
Qty: 0 | Refills: 0 | DISCHARGE

## 2020-11-19 RX ORDER — MULTIVIT-MIN/FERROUS GLUCONATE 9 MG/15 ML
1 LIQUID (ML) ORAL
Qty: 0 | Refills: 0 | DISCHARGE

## 2020-11-19 RX ORDER — ROSUVASTATIN CALCIUM 5 MG/1
1 TABLET ORAL
Qty: 0 | Refills: 0 | DISCHARGE

## 2020-11-19 RX ORDER — METOPROLOL TARTRATE 50 MG
1 TABLET ORAL
Qty: 0 | Refills: 0 | DISCHARGE

## 2020-11-19 NOTE — H&P PST ADULT - NSANTHOSAYNRD_GEN_A_CORE
No. OLIVRE screening performed.  STOP BANG Legend: 0-2 = LOW Risk; 3-4 = INTERMEDIATE Risk; 5-8 = HIGH Risk

## 2020-11-19 NOTE — H&P PST ADULT - NSICDXPASTMEDICALHX_GEN_ALL_CORE_FT
PAST MEDICAL HISTORY:  Former smoker     HLD (hyperlipidemia)     HTN (hypertension)     Renal cancer, left     Situational anxiety

## 2020-11-19 NOTE — H&P PST ADULT - HISTORY OF PRESENT ILLNESS
75 y/ male with PMH of HTN, HLD and Situational Anxiety presents to PST for preoperative evaluation with dx of other specified disorders of kidney and ureter. Pt. reports history of surveillance of renal mass s/p US, CT scan & biopsy confirming left renal cancer now scheduled for left laparoscopic partial nephrectomy, possible partial, possible open with Dr. Johnson.

## 2020-11-19 NOTE — H&P PST ADULT - CORNEAL ABRASION RISK
Healthy Active Living  An initiative of the American Academy of Pediatrics    Fact Sheet: Healthy Active Living for Families    Healthy nutrition starts as early as infancy with breastfeeding.  Once your baby begins eating solid foods, introduce nutritiou At this age, your baby may take his or her first steps. Although some babies take their first steps when they are younger and some when they are older.       At the 12-month checkup, the healthcare provider will examine the child and ask how things are chanell · Avoid foods your child might choke on. This is common with foods about the size and shape of the child’s throat. They include sections of hot dogs and sausages, hard candies, nuts, whole grapes, and raw vegetables.  Ask the healthcare provider about other As your child becomes more mobile, active supervision is crucial. Always be aware of what your child is doing. An accident can happen in a split second. To keep your baby safe:   · If you have not already done so, childproof the house.  If your toddler is p · Haemophilus influenzae type b  · Hepatitis A  · Hepatitis B  · Influenza (flu)  · Measles, mumps, and rubella  · Pneumococcus  · Polio  · Varicella (chickenpox)  Choosing shoes  Your 3year-old may be walking.  Now is the time to invest in a good pair of denies

## 2020-11-19 NOTE — H&P PST ADULT - NSICDXPROBLEM_GEN_ALL_CORE_FT
PROBLEM DIAGNOSES  Problem: Renal cancer, left  Assessment and Plan:  Scheduled for left laparoscopic partial nephrectomy, possible partial, possible open with Dr. Johnson on 11/25/2020.  Verbal and written pre-op instructions provided to patient. Patient verbalized understanding and will call surgeons office for revised instructions if surgery is rescheduled.   Pepcid for GI prophylaxis provided.   patient given verbal and written instruction with teach back on chlorhexidine shampoo, and the patient verbalized understanding with return demonstration.   Patient aware of need for COVID testing prior to  procedure and advised to coordinate with surgeon.   Patient will obtain medical clearance as per surgeons request-copy requested.     Problem: Depression  Assessment and Plan: Pt. instructed to continue medications as prescribed.     Problem: Hypothyroidism  Assessment and Plan: Pt. instructed to take Synthroid DOS with a small sip of water.     Problem: OLIVER (obstructive sleep apnea)  Assessment and Plan: OLIVER precautions-OR booking notified

## 2020-11-19 NOTE — H&P PST ADULT - RS GEN PE MLT RESP DETAILS PC
breath sounds equal/good air movement/clear to auscultation bilaterally/airway patent/respirations non-labored/no wheezes

## 2020-11-19 NOTE — H&P PST ADULT - NEGATIVE ENMT SYMPTOMS
no ear pain/no tinnitus/no vertigo/no post-nasal discharge/no gum bleeding/no nose bleeds/no hearing difficulty/no dysphagia

## 2020-11-19 NOTE — H&P PST ADULT - NSICDXFAMILYHX_GEN_ALL_CORE_FT
FAMILY HISTORY:  Father  Still living? Unknown  Family history of heart attack, Age at diagnosis: Age Unknown    Mother  Still living? No  Family history of renal failure, Age at diagnosis: Age Unknown

## 2020-11-19 NOTE — H&P PST ADULT - NSICDXPASTSURGICALHX_GEN_ALL_CORE_FT
PAST SURGICAL HISTORY:  H/O melanoma excision right shoulder in 2015    H/O needle biopsy of prostate    S/P wrist surgery left wrist, 30 years ago with hardware placement and excision of hardware months later

## 2020-11-19 NOTE — H&P PST ADULT - ASSESSMENT
76 yo male  scheduled for left laparoscopic partial nephrectomy, possible partial, possible open with Dr. Johnson on 11/23/2020.

## 2020-11-19 NOTE — H&P PST ADULT - NEGATIVE GENERAL SYMPTOMS
no fatigue/no chills/no fever/no sweating/no polydipsia/no weight loss/no weight gain/no polyphagia/no polyuria

## 2020-11-19 NOTE — H&P PST ADULT - NEGATIVE CARDIOVASCULAR SYMPTOMS
no orthopnea/no dyspnea on exertion/no palpitations/no paroxysmal nocturnal dyspnea/no claudication/no chest pain/no peripheral edema

## 2020-11-20 ENCOUNTER — APPOINTMENT (OUTPATIENT)
Dept: DISASTER EMERGENCY | Facility: CLINIC | Age: 75
End: 2020-11-20

## 2020-11-20 ENCOUNTER — APPOINTMENT (OUTPATIENT)
Dept: UROLOGY | Facility: CLINIC | Age: 75
End: 2020-11-20

## 2020-11-20 LAB
CULTURE RESULTS: NO GROWTH — SIGNIFICANT CHANGE UP
SPECIMEN SOURCE: SIGNIFICANT CHANGE UP

## 2020-11-20 NOTE — ASU PATIENT PROFILE, ADULT - PMH
Former smoker    HLD (hyperlipidemia)    HTN (hypertension)    Renal cancer, left    Situational anxiety

## 2020-11-21 LAB — SARS-COV-2 N GENE NPH QL NAA+PROBE: NOT DETECTED

## 2020-11-22 ENCOUNTER — TRANSCRIPTION ENCOUNTER (OUTPATIENT)
Age: 75
End: 2020-11-22

## 2020-11-23 ENCOUNTER — APPOINTMENT (OUTPATIENT)
Dept: UROLOGY | Facility: HOSPITAL | Age: 75
End: 2020-11-23

## 2020-11-23 ENCOUNTER — RESULT REVIEW (OUTPATIENT)
Age: 75
End: 2020-11-23

## 2020-11-23 ENCOUNTER — INPATIENT (INPATIENT)
Facility: HOSPITAL | Age: 75
LOS: 0 days | Discharge: HOME | End: 2020-11-24
Attending: UROLOGY | Admitting: UROLOGY
Payer: MEDICARE

## 2020-11-23 VITALS
OXYGEN SATURATION: 98 % | HEART RATE: 68 BPM | DIASTOLIC BLOOD PRESSURE: 71 MMHG | WEIGHT: 179.9 LBS | HEIGHT: 71 IN | TEMPERATURE: 98 F | RESPIRATION RATE: 16 BRPM | SYSTOLIC BLOOD PRESSURE: 126 MMHG

## 2020-11-23 DIAGNOSIS — Z98.890 OTHER SPECIFIED POSTPROCEDURAL STATES: Chronic | ICD-10-CM

## 2020-11-23 DIAGNOSIS — Z29.9 ENCOUNTER FOR PROPHYLACTIC MEASURES, UNSPECIFIED: ICD-10-CM

## 2020-11-23 DIAGNOSIS — N28.89 OTHER SPECIFIED DISORDERS OF KIDNEY AND URETER: ICD-10-CM

## 2020-11-23 DIAGNOSIS — R94.31 ABNORMAL ELECTROCARDIOGRAM [ECG] [EKG]: ICD-10-CM

## 2020-11-23 DIAGNOSIS — F41.8 OTHER SPECIFIED ANXIETY DISORDERS: ICD-10-CM

## 2020-11-23 DIAGNOSIS — E78.5 HYPERLIPIDEMIA, UNSPECIFIED: ICD-10-CM

## 2020-11-23 DIAGNOSIS — I10 ESSENTIAL (PRIMARY) HYPERTENSION: ICD-10-CM

## 2020-11-23 PROCEDURE — 76998 US GUIDE INTRAOP: CPT | Mod: 26

## 2020-11-23 PROCEDURE — 88305 TISSUE EXAM BY PATHOLOGIST: CPT | Mod: 26

## 2020-11-23 PROCEDURE — 99223 1ST HOSP IP/OBS HIGH 75: CPT

## 2020-11-23 PROCEDURE — 88313 SPECIAL STAINS GROUP 2: CPT | Mod: 26

## 2020-11-23 PROCEDURE — 88307 TISSUE EXAM BY PATHOLOGIST: CPT | Mod: 26

## 2020-11-23 PROCEDURE — 50543 LAPARO PARTIAL NEPHRECTOMY: CPT | Mod: LT

## 2020-11-23 RX ORDER — QUETIAPINE FUMARATE 200 MG/1
50 TABLET, FILM COATED ORAL AT BEDTIME
Refills: 0 | Status: DISCONTINUED | OUTPATIENT
Start: 2020-11-23 | End: 2020-11-24

## 2020-11-23 RX ORDER — ASPIRIN/CALCIUM CARB/MAGNESIUM 324 MG
81 TABLET ORAL DAILY
Refills: 0 | Status: DISCONTINUED | OUTPATIENT
Start: 2020-11-23 | End: 2020-11-24

## 2020-11-23 RX ORDER — ZOLPIDEM TARTRATE 10 MG/1
5 TABLET ORAL AT BEDTIME
Refills: 0 | Status: DISCONTINUED | OUTPATIENT
Start: 2020-11-23 | End: 2020-11-24

## 2020-11-23 RX ORDER — MIRTAZAPINE 45 MG/1
45 TABLET, ORALLY DISINTEGRATING ORAL AT BEDTIME
Refills: 0 | Status: DISCONTINUED | OUTPATIENT
Start: 2020-11-23 | End: 2020-11-24

## 2020-11-23 RX ORDER — ACETAMINOPHEN 500 MG
1000 TABLET ORAL EVERY 6 HOURS
Refills: 0 | Status: COMPLETED | OUTPATIENT
Start: 2020-11-23 | End: 2020-11-24

## 2020-11-23 RX ORDER — SODIUM CHLORIDE 9 MG/ML
1000 INJECTION, SOLUTION INTRAVENOUS
Refills: 0 | Status: DISCONTINUED | OUTPATIENT
Start: 2020-11-23 | End: 2020-11-24

## 2020-11-23 RX ORDER — METOPROLOL TARTRATE 50 MG
50 TABLET ORAL DAILY
Refills: 0 | Status: DISCONTINUED | OUTPATIENT
Start: 2020-11-23 | End: 2020-11-24

## 2020-11-23 RX ORDER — SODIUM CHLORIDE 9 MG/ML
1000 INJECTION, SOLUTION INTRAVENOUS
Refills: 0 | Status: DISCONTINUED | OUTPATIENT
Start: 2020-11-23 | End: 2020-11-23

## 2020-11-23 RX ORDER — SENNA PLUS 8.6 MG/1
2 TABLET ORAL AT BEDTIME
Refills: 0 | Status: DISCONTINUED | OUTPATIENT
Start: 2020-11-23 | End: 2020-11-24

## 2020-11-23 RX ORDER — AMLODIPINE BESYLATE 2.5 MG/1
5 TABLET ORAL DAILY
Refills: 0 | Status: DISCONTINUED | OUTPATIENT
Start: 2020-11-23 | End: 2020-11-24

## 2020-11-23 RX ORDER — KETOROLAC TROMETHAMINE 30 MG/ML
15 SYRINGE (ML) INJECTION EVERY 6 HOURS
Refills: 0 | Status: DISCONTINUED | OUTPATIENT
Start: 2020-11-23 | End: 2020-11-24

## 2020-11-23 RX ORDER — OXYCODONE HYDROCHLORIDE 5 MG/1
10 TABLET ORAL EVERY 4 HOURS
Refills: 0 | Status: DISCONTINUED | OUTPATIENT
Start: 2020-11-23 | End: 2020-11-24

## 2020-11-23 RX ORDER — OXYCODONE HYDROCHLORIDE 5 MG/1
5 TABLET ORAL EVERY 6 HOURS
Refills: 0 | Status: DISCONTINUED | OUTPATIENT
Start: 2020-11-23 | End: 2020-11-24

## 2020-11-23 RX ORDER — LEVOTHYROXINE SODIUM 125 MCG
25 TABLET ORAL DAILY
Refills: 0 | Status: DISCONTINUED | OUTPATIENT
Start: 2020-11-23 | End: 2020-11-24

## 2020-11-23 RX ORDER — ATORVASTATIN CALCIUM 80 MG/1
20 TABLET, FILM COATED ORAL AT BEDTIME
Refills: 0 | Status: DISCONTINUED | OUTPATIENT
Start: 2020-11-23 | End: 2020-11-24

## 2020-11-23 RX ORDER — HYDROMORPHONE HYDROCHLORIDE 2 MG/ML
0.5 INJECTION INTRAMUSCULAR; INTRAVENOUS; SUBCUTANEOUS
Refills: 0 | Status: DISCONTINUED | OUTPATIENT
Start: 2020-11-23 | End: 2020-11-24

## 2020-11-23 RX ORDER — LIDOCAINE 4 G/100G
1 CREAM TOPICAL
Refills: 0 | Status: DISCONTINUED | OUTPATIENT
Start: 2020-11-23 | End: 2020-11-24

## 2020-11-23 RX ORDER — LOSARTAN POTASSIUM 100 MG/1
25 TABLET, FILM COATED ORAL DAILY
Refills: 0 | Status: DISCONTINUED | OUTPATIENT
Start: 2020-11-23 | End: 2020-11-24

## 2020-11-23 RX ORDER — CLONAZEPAM 1 MG
0.5 TABLET ORAL
Refills: 0 | Status: DISCONTINUED | OUTPATIENT
Start: 2020-11-23 | End: 2020-11-24

## 2020-11-23 RX ORDER — HEPARIN SODIUM 5000 [USP'U]/ML
5000 INJECTION INTRAVENOUS; SUBCUTANEOUS EVERY 8 HOURS
Refills: 0 | Status: DISCONTINUED | OUTPATIENT
Start: 2020-11-23 | End: 2020-11-24

## 2020-11-23 RX ORDER — POLYETHYLENE GLYCOL 3350 17 G/17G
17 POWDER, FOR SOLUTION ORAL DAILY
Refills: 0 | Status: DISCONTINUED | OUTPATIENT
Start: 2020-11-23 | End: 2020-11-24

## 2020-11-23 RX ADMIN — SODIUM CHLORIDE 125 MILLILITER(S): 9 INJECTION, SOLUTION INTRAVENOUS at 12:59

## 2020-11-23 RX ADMIN — Medication 81 MILLIGRAM(S): at 17:51

## 2020-11-23 RX ADMIN — LIDOCAINE 1 APPLICATION(S): 4 CREAM TOPICAL at 12:59

## 2020-11-23 RX ADMIN — AMLODIPINE BESYLATE 5 MILLIGRAM(S): 2.5 TABLET ORAL at 17:51

## 2020-11-23 RX ADMIN — Medication 15 MILLIGRAM(S): at 12:20

## 2020-11-23 RX ADMIN — MIRTAZAPINE 45 MILLIGRAM(S): 45 TABLET, ORALLY DISINTEGRATING ORAL at 22:23

## 2020-11-23 RX ADMIN — HEPARIN SODIUM 5000 UNIT(S): 5000 INJECTION INTRAVENOUS; SUBCUTANEOUS at 22:01

## 2020-11-23 RX ADMIN — Medication 400 MILLIGRAM(S): at 12:59

## 2020-11-23 RX ADMIN — SODIUM CHLORIDE 125 MILLILITER(S): 9 INJECTION, SOLUTION INTRAVENOUS at 10:00

## 2020-11-23 RX ADMIN — Medication 0.5 MILLIGRAM(S): at 22:01

## 2020-11-23 RX ADMIN — Medication 15 MILLIGRAM(S): at 17:51

## 2020-11-23 RX ADMIN — Medication 400 MILLIGRAM(S): at 17:50

## 2020-11-23 RX ADMIN — HEPARIN SODIUM 5000 UNIT(S): 5000 INJECTION INTRAVENOUS; SUBCUTANEOUS at 13:05

## 2020-11-23 RX ADMIN — Medication 50 MILLIGRAM(S): at 17:51

## 2020-11-23 RX ADMIN — ZOLPIDEM TARTRATE 5 MILLIGRAM(S): 10 TABLET ORAL at 23:14

## 2020-11-23 RX ADMIN — QUETIAPINE FUMARATE 50 MILLIGRAM(S): 200 TABLET, FILM COATED ORAL at 22:23

## 2020-11-23 NOTE — CONSULT NOTE ADULT - SUBJECTIVE AND OBJECTIVE BOX
Patient is a 75y old  Male who presents with a chief complaint of "I have renal cancer" (19 Nov 2020 09:27)      HPI:  75 y/ male with PMH of HTN, HLD and Situational Anxiety, renal mass admitted  for left laparoscopic partial nephrectomy, possible partial with Dr. Johnson.  S/P OR, ELBL 200cc. POD#0. Currently only c/o discomfort of Eastman catheter area. Denies any CP or SOB       History limited due to: [ ] Dementia  [ ] Delirium  [ ] Condition    Pain Symptoms if applicable:             	                         	   mild         Pain:	                                1-3	       Location:	 catheter area   Modifying factors:	  Associated symptoms:	    Function: [x ] Independent  [ ] Assistance  [ ] Total care  [ ] Non-ambulatory    Allergies    No Known Allergies    Intolerances        HOME MEDICATIONS: [ x] Reviewed    MEDICATIONS  (STANDING):  acetaminophen  IVPB .. 1000 milliGRAM(s) IV Intermittent every 6 hours  amLODIPine   Tablet 5 milliGRAM(s) Oral daily  aspirin enteric coated 81 milliGRAM(s) Oral daily  atorvastatin 20 milliGRAM(s) Oral at bedtime  heparin   Injectable 5000 Unit(s) SubCutaneous every 8 hours  ketorolac   Injectable 15 milliGRAM(s) IV Push every 6 hours  lactated ringers. 1000 milliLiter(s) (125 mL/Hr) IV Continuous <Continuous>  levothyroxine 25 MICROGram(s) Oral daily  losartan 25 milliGRAM(s) Oral daily  metoprolol succinate ER 50 milliGRAM(s) Oral daily  mirtazapine 45 milliGRAM(s) Oral at bedtime  polyethylene glycol 3350 17 Gram(s) Oral daily  senna 2 Tablet(s) Oral at bedtime    MEDICATIONS  (PRN):  HYDROmorphone  Injectable 0.5 milliGRAM(s) IV Push every 10 minutes PRN Severe Pain (7 - 10)  oxyCODONE    IR 5 milliGRAM(s) Oral every 6 hours PRN Moderate Pain (4 - 6)  oxyCODONE    IR 10 milliGRAM(s) Oral every 4 hours PRN Severe Pain (7 - 10)  zolpidem 5 milliGRAM(s) Oral at bedtime PRN Insomnia      PAST MEDICAL & SURGICAL HISTORY:  Renal cancer, left    Former smoker    Situational anxiety    HLD (hyperlipidemia)    HTN (hypertension)    H/O melanoma excision  right shoulder in 2015    S/P wrist surgery  left wrist, 30 years ago with hardware placement and excision of hardware months later    H/O needle biopsy  of prostate    [x ] Reviewed     SOCIAL HISTORY:  Residence: [ ] [facility]  [ ] SNF  [x ] Community  [ ] Substance abuse: denies  [ ] Tobacco: ex-smoker, quit 40 years ago  [ ] Alcohol use: (+) Social drink, CAGE (-)    FAMILY HISTORY:  Family history of renal failure (Mother)    Family history of heart attack (Father)  father age 52  brother age 57    [ ] No pertinent family history in first degree relatives     REVIEW OF SYSTEMS:    CONSTITUTIONAL: No fever, weight loss, or fatigue  EYES: No eye pain, visual disturbances, or discharge  ENMT:  No difficulty hearing, tinnitus, vertigo; No sinus or throat pain  NECK: No pain or stiffness  BREASTS: No pain, masses, or nipple discharge  RESPIRATORY: No cough, wheezing, chills or hemoptysis; No shortness of breath  CARDIOVASCULAR: No chest pain, palpitations, dizziness, or leg swelling  GASTROINTESTINAL: No abdominal or epigastric pain. No nausea, vomiting, or hematemesis; No diarrhea or constipation. No melena or hematochezia. No flatus or BM post op   GENITOURINARY: (+) urethral discomfort due to Eastman. . (+) eastman post op   NEUROLOGICAL: No headaches, memory loss, loss of strength, numbness, or tremors  SKIN: No itching, burning, rashes, or lesions   LYMPH NODES: No enlarged glands  ENDOCRINE: No heat or cold intolerance; No hair loss  MUSCULOSKELETAL: No muscle or back pain  PSYCHIATRIC: No depression, anxiety, mood swings, or difficulty sleeping  HEME/LYMPH: No easy bruising, or bleeding gums  ALLERGY AND IMMUNOLOGIC: No hives or eczema    [  ] All other ROS negative  [  ] Unable to obtain due to poor mental status    Vital Signs Last 24 Hrs  T(C): 36 (23 Nov 2020 11:00), Max: 36.7 (23 Nov 2020 06:37)  T(F): 96.8 (23 Nov 2020 11:00), Max: 98.1 (23 Nov 2020 06:37)  HR: 78 (23 Nov 2020 11:15) (68 - 78)  BP: 147/75 (23 Nov 2020 11:15) (126/71 - 173/87)  BP(mean): 93 (23 Nov 2020 11:15) (89 - 108)  RR: 12 (23 Nov 2020 11:15) (9 - 28)  SpO2: 95% (23 Nov 2020 11:15) (80% - 100%)    PHYSICAL EXAM:    GENERAL: NAD, well-groomed, well-developed  HEAD:  Atraumatic, Normocephalic  EYES: EOMI, PERRLA, conjunctiva and sclera clear  ENMT: Moist mucous membranes  NECK: Supple, No JVD  RESPIRATORY: Clear to auscultation bilaterally; No rales, rhonchi, wheezing, or rubs  CARDIOVASCULAR: Regular rate and rhythm; No murmurs, rubs, or gallops  GASTROINTESTINAL: Soft, mildly tender, Nondistended; Bowel sounds hypoactive   GENITOURINARY: (+) eastman  EXTREMITIES:  2+ Peripheral Pulses, No clubbing, cyanosis, or edema  NERVOUS SYSTEM:  Alert & Oriented X3; Moving all 4 extremities; No gross sensory deficits  HEME/LYMPH: No lymphadenopathy noted  SKIN: No rashes or lesions; Incisions C/D/I    LABS:    Pre op lab reviewed           CAPILLARY BLOOD GLUCOSE          RADIOLOGY & ADDITIONAL STUDIES:    EKG:   Personally Reviewed:  [ x] YES 11/19/2020: NSR, (+) inverted TW at II, III, AVF, V4-V6.     Imaging:   Personally Reviewed:  [ ] YES               Consultant(s) notes reviewed:    Care Discussed with Consultant(s)/Other Providers:    Advanced Directives: [ ] DNR  [ ] No feeding tube  [ ] MOLST in chart  [ ] MOLST completed today  [x ] Unknown

## 2020-11-23 NOTE — PROGRESS NOTE ADULT - SUBJECTIVE AND OBJECTIVE BOX
Post op check    This 76 yo M is s/p L. lap partial neph  PMH: HTN; chol; anxiety  Pt is awake and alert  Has no c/o  Afeb 132/70 76 98%RA    Abd- soft; appropriately tender             wounds C&D  [address] [address]

## 2020-11-23 NOTE — CONSULT NOTE ADULT - ASSESSMENT
75 y/ male with PMH of HTN, HLD and Situational Anxiety, renal mass admitted  for left laparoscopic partial nephrectomy, possible partial with Dr. Johnson.  S/P OR, ELBL 200cc. POD#0.

## 2020-11-23 NOTE — CONSULT NOTE ADULT - PROBLEM SELECTOR RECOMMENDATION 6
Pre op EKG showed flipped TW at II, III, AVF, V4-6. Asymptomatic.   -monitor for signs of ACS  -Outpatient f/u with PCP

## 2020-11-24 ENCOUNTER — TRANSCRIPTION ENCOUNTER (OUTPATIENT)
Age: 75
End: 2020-11-24

## 2020-11-24 VITALS
RESPIRATION RATE: 17 BRPM | TEMPERATURE: 98 F | DIASTOLIC BLOOD PRESSURE: 60 MMHG | HEART RATE: 66 BPM | OXYGEN SATURATION: 100 % | SYSTOLIC BLOOD PRESSURE: 132 MMHG

## 2020-11-24 PROBLEM — C64.2 MALIGNANT NEOPLASM OF LEFT KIDNEY, EXCEPT RENAL PELVIS: Chronic | Status: ACTIVE | Noted: 2020-11-19

## 2020-11-24 LAB
ANION GAP SERPL CALC-SCNC: 8 MMO/L — SIGNIFICANT CHANGE UP (ref 7–14)
BUN SERPL-MCNC: 15 MG/DL — SIGNIFICANT CHANGE UP (ref 7–23)
CALCIUM SERPL-MCNC: 8.1 MG/DL — LOW (ref 8.4–10.5)
CHLORIDE SERPL-SCNC: 106 MMOL/L — SIGNIFICANT CHANGE UP (ref 98–107)
CO2 SERPL-SCNC: 24 MMOL/L — SIGNIFICANT CHANGE UP (ref 22–31)
CREAT SERPL-MCNC: 0.77 MG/DL — SIGNIFICANT CHANGE UP (ref 0.5–1.3)
GLUCOSE SERPL-MCNC: 99 MG/DL — SIGNIFICANT CHANGE UP (ref 70–99)
HCT VFR BLD CALC: 32.2 % — LOW (ref 39–50)
HGB BLD-MCNC: 11.5 G/DL — LOW (ref 13–17)
MCHC RBC-ENTMCNC: 34.4 PG — HIGH (ref 27–34)
MCHC RBC-ENTMCNC: 35.7 % — SIGNIFICANT CHANGE UP (ref 32–36)
MCV RBC AUTO: 96.4 FL — SIGNIFICANT CHANGE UP (ref 80–100)
NRBC # FLD: 0 K/UL — SIGNIFICANT CHANGE UP (ref 0–0)
PLATELET # BLD AUTO: 165 K/UL — SIGNIFICANT CHANGE UP (ref 150–400)
PMV BLD: 9.7 FL — SIGNIFICANT CHANGE UP (ref 7–13)
POTASSIUM SERPL-MCNC: 4.2 MMOL/L — SIGNIFICANT CHANGE UP (ref 3.5–5.3)
POTASSIUM SERPL-SCNC: 4.2 MMOL/L — SIGNIFICANT CHANGE UP (ref 3.5–5.3)
RBC # BLD: 3.34 M/UL — LOW (ref 4.2–5.8)
RBC # FLD: 14.1 % — SIGNIFICANT CHANGE UP (ref 10.3–14.5)
SODIUM SERPL-SCNC: 138 MMOL/L — SIGNIFICANT CHANGE UP (ref 135–145)
WBC # BLD: 8.5 K/UL — SIGNIFICANT CHANGE UP (ref 3.8–10.5)
WBC # FLD AUTO: 8.5 K/UL — SIGNIFICANT CHANGE UP (ref 3.8–10.5)

## 2020-11-24 PROCEDURE — 99233 SBSQ HOSP IP/OBS HIGH 50: CPT

## 2020-11-24 RX ORDER — SODIUM CHLORIDE 9 MG/ML
1000 INJECTION, SOLUTION INTRAVENOUS
Refills: 0 | Status: DISCONTINUED | OUTPATIENT
Start: 2020-11-24 | End: 2020-11-24

## 2020-11-24 RX ADMIN — POLYETHYLENE GLYCOL 3350 17 GRAM(S): 17 POWDER, FOR SOLUTION ORAL at 12:19

## 2020-11-24 RX ADMIN — Medication 15 MILLIGRAM(S): at 05:31

## 2020-11-24 RX ADMIN — Medication 15 MILLIGRAM(S): at 12:19

## 2020-11-24 RX ADMIN — HEPARIN SODIUM 5000 UNIT(S): 5000 INJECTION INTRAVENOUS; SUBCUTANEOUS at 05:31

## 2020-11-24 RX ADMIN — Medication 25 MICROGRAM(S): at 05:31

## 2020-11-24 RX ADMIN — Medication 0.5 MILLIGRAM(S): at 05:30

## 2020-11-24 RX ADMIN — SODIUM CHLORIDE 75 MILLILITER(S): 9 INJECTION, SOLUTION INTRAVENOUS at 07:13

## 2020-11-24 RX ADMIN — Medication 400 MILLIGRAM(S): at 05:30

## 2020-11-24 RX ADMIN — Medication 400 MILLIGRAM(S): at 00:09

## 2020-11-24 RX ADMIN — Medication 15 MILLIGRAM(S): at 00:09

## 2020-11-24 NOTE — PROGRESS NOTE ADULT - SUBJECTIVE AND OBJECTIVE BOX
Patient is a 75y old  Male who presents with a chief complaint of "I have renal cancer" (19 Nov 2020 09:27)      SUBJECTIVE / OVERNIGHT EVENTS: Feels better, passed TOV, (+) flatus. No CP or SOB.     MEDICATIONS  (STANDING):  amLODIPine   Tablet 5 milliGRAM(s) Oral daily  aspirin enteric coated 81 milliGRAM(s) Oral daily  atorvastatin 20 milliGRAM(s) Oral at bedtime  clonazePAM  Tablet 0.5 milliGRAM(s) Oral two times a day  dextrose 5% + sodium chloride 0.45%. 1000 milliLiter(s) (75 mL/Hr) IV Continuous <Continuous>  heparin   Injectable 5000 Unit(s) SubCutaneous every 8 hours  ketorolac   Injectable 15 milliGRAM(s) IV Push every 6 hours  levothyroxine 25 MICROGram(s) Oral daily  losartan 25 milliGRAM(s) Oral daily  metoprolol succinate ER 50 milliGRAM(s) Oral daily  mirtazapine 45 milliGRAM(s) Oral at bedtime  polyethylene glycol 3350 17 Gram(s) Oral daily  QUEtiapine 50 milliGRAM(s) Oral at bedtime  senna 2 Tablet(s) Oral at bedtime    MEDICATIONS  (PRN):  HYDROmorphone  Injectable 0.5 milliGRAM(s) IV Push every 10 minutes PRN Severe Pain (7 - 10)  lidocaine 2% Gel 1 Application(s) Topical four times a day PRN irritation  lidocaine 5% Ointment 1 Application(s) Topical every 3 hours PRN burning  oxyCODONE    IR 5 milliGRAM(s) Oral every 6 hours PRN Moderate Pain (4 - 6)  oxyCODONE    IR 10 milliGRAM(s) Oral every 4 hours PRN Severe Pain (7 - 10)  zolpidem 5 milliGRAM(s) Oral at bedtime PRN Insomnia      Vital Signs Last 24 Hrs  T(C): 36.5 (24 Nov 2020 09:30), Max: 36.9 (23 Nov 2020 21:38)  T(F): 97.7 (24 Nov 2020 09:30), Max: 98.4 (23 Nov 2020 21:38)  HR: 66 (24 Nov 2020 09:30) (66 - 101)  BP: 132/60 (24 Nov 2020 09:30) (132/60 - 173/87)  BP(mean): 93 (23 Nov 2020 11:15) (89 - 108)  RR: 17 (24 Nov 2020 09:30) (9 - 28)  SpO2: 100% (24 Nov 2020 09:30) (80% - 100%)  CAPILLARY BLOOD GLUCOSE        I&O's Summary    23 Nov 2020 07:01  -  24 Nov 2020 07:00  --------------------------------------------------------  IN: 1365 mL / OUT: 2935 mL / NET: -1570 mL    24 Nov 2020 07:01  -  24 Nov 2020 09:32  --------------------------------------------------------  IN: 0 mL / OUT: 400 mL / NET: -400 mL        PHYSICAL EXAM:  GENERAL: NAD, well-developed  HEAD:  Atraumatic, Normocephalic  EYES: EOMI, PERRLA, conjunctiva and sclera clear  NECK: Supple, No JVD  CHEST/LUNG: Clear to auscultation bilaterally; No wheeze  HEART: Regular rate and rhythm; No murmurs, rubs, or gallops  ABDOMEN: Soft, mildly tender, Nondistended; Bowel sounds present  EXTREMITIES:  2+ Peripheral Pulses, No clubbing, cyanosis, or edema  PSYCH: AAOx3  NEUROLOGY: non-focal  SKIN: No rashes    LABS:                        11.5   8.50  )-----------( 165      ( 24 Nov 2020 05:45 )             32.2     11-24    138  |  106  |  15  ----------------------------<  99  4.2   |  24  |  0.77    Ca    8.1<L>      24 Nov 2020 05:45                RADIOLOGY & ADDITIONAL TESTS:    Imaging Personally Reviewed:    Consultant(s) Notes Reviewed:      Care Discussed with Consultants/Other Providers:

## 2020-11-24 NOTE — DISCHARGE NOTE PROVIDER - CARE PROVIDER_API CALL
Ezequiel Johnson  UROLOGY  [address] [place] [address], [place] [address]  Phone: [phone]  Fax: [phone]  Follow Up Time:

## 2020-11-24 NOTE — DISCHARGE NOTE NURSING/CASE MANAGEMENT/SOCIAL WORK - NSDCPNINST_GEN_ALL_CORE
call MD for any increase in pain that is unrelieved by pain medication, fever greater than 101, nausea or vomiting, chills, drainage, redness or bleeding from around the surgical sites, decreased urine output, blood clots in the urine, or bright red urine. Call MD for a follow up appointment in 1-2 weeks

## 2020-11-24 NOTE — PROGRESS NOTE ADULT - PROBLEM SELECTOR PLAN 1
S/P Partial nephrectomy, POD#1  -Management as per   -Pain control  -Bowel regimen  -OOB and ambulate  -DVT prophylaxis

## 2020-11-24 NOTE — PROGRESS NOTE ADULT - ASSESSMENT
75 y/ male with PMH of HTN, HLD and Situational Anxiety, renal mass admitted  for left laparoscopic partial nephrectomy, possible partial with Dr. Johnson.  S/P OR, ELBL 200cc. POD#1.

## 2020-11-24 NOTE — PROGRESS NOTE ADULT - ASSESSMENT
75M s/p L partial nephrectomy  -- eastman removed, await TOV  -- await AM labs  -- pt w/some flatus, plan to advance diet today  -- IS/OOB/analgesia PRN  -- dispo planning today

## 2020-11-24 NOTE — DISCHARGE NOTE PROVIDER - HOSPITAL COURSE
Pt had L. lap partial nephrectomy yesterday; did well; ambulating; pain controlled; voided well; passed gas and kelsey reg diet; home today; f/u in office.

## 2020-11-24 NOTE — PROGRESS NOTE ADULT - SUBJECTIVE AND OBJECTIVE BOX
Subjective  No overnight events. Pt did not get much sleep - states he was restless. Pain controlled. Pt w/some flatus    Objective    Vital signs  T(F): , Max: 98.4 (11-23-20 @ 21:38)  HR: 72 (11-24-20 @ 05:29)  BP: 133/85 (11-24-20 @ 05:29)  SpO2: 100% (11-24-20 @ 05:29)  Wt(kg): --    Output     11-23 @ 07:01  -  11-24 @ 06:37  --------------------------------------------------------  IN: 1365 mL / OUT: 2435 mL / NET: -1070 mL        Gen: NAD  Abd: soft, appropriately tender, ND, steris c/d/i    Labs    Imaging

## 2020-11-24 NOTE — DISCHARGE NOTE PROVIDER - NSDCCPCAREPLAN_GEN_ALL_CORE_FT
PRINCIPAL DISCHARGE DIAGNOSIS  Diagnosis: Renal cancer, left  Assessment and Plan of Treatment: Drink plenty of fluids.  No heavy lifting (greater than 10 pounds) or straining for 4 to 6 weeks.  You may shower, just pat white strips dry, they will fall off in a few weeks.   Call 's office  to schedule a follow up appointment.  Call the office if you have fever greater than 101, difficulty urinating, nausea/vomiting..

## 2020-11-24 NOTE — DISCHARGE NOTE PROVIDER - NSDCMRMEDTOKEN_GEN_ALL_CORE_FT
Ambien 5 mg oral tablet: 1 tab(s) orally once a day (at bedtime)  aspirin 81 mg oral tablet: 1 tab(s) orally once a day am , stopped day 10 days ago  Centrum Silver Men&#x27;s oral tablet: 1 tab(s) orally once a day   irbesartan 75 mg oral tablet: 1 tab(s) orally once a day  KlonoPIN 0.5 mg oral tablet: orally once a day  metoprolol succinate 50 mg oral tablet, extended release: 1 tab(s) orally once a day  Norvasc 5 mg oral tablet: 1 tab(s) orally once a day   Remeron 45 mg oral tablet: 1 tab(s) orally once a day (at bedtime)  rosuvastatin 5 mg oral tablet: 1 tab(s) orally once a day  Synthroid 25 mcg (0.025 mg) oral tablet: 1 tab(s) orally once a day am  Vitamin D3 1000 intl units (25 mcg) oral tablet: 1  orally once a day

## 2020-11-24 NOTE — DISCHARGE NOTE NURSING/CASE MANAGEMENT/SOCIAL WORK - PATIENT PORTAL LINK FT
You can access the FollowMyHealth Patient Portal offered by [facility] by registering at the following website: [email or web address]. By joining [employer]’s FollowMyHealth portal, you will also be able to view your health information using other applications (apps) compatible with our system.

## 2020-11-25 ENCOUNTER — APPOINTMENT (OUTPATIENT)
Dept: UROLOGY | Facility: CLINIC | Age: 75
End: 2020-11-25
Payer: MEDICARE

## 2020-11-25 DIAGNOSIS — N99.89 OTHER POSTPROCEDURAL COMPLICATIONS AND DISORDERS OF GENITOURINARY SYSTEM: ICD-10-CM

## 2020-11-25 DIAGNOSIS — R33.8 OTHER POSTPROCEDURAL COMPLICATIONS AND DISORDERS OF GENITOURINARY SYSTEM: ICD-10-CM

## 2020-11-25 PROCEDURE — 99024 POSTOP FOLLOW-UP VISIT: CPT

## 2020-11-25 NOTE — ASSESSMENT
[FreeTextEntry1] : Booker catheter placed, \par Clear urine output.\par \par Prior imaging showed 85 mL prostate volume.  Previously failed tamsulosin.\par Recommend to start alfuzosin 10 mg once daily at bedtime.\par Goals of medication reviewed.  Discussed the potential adverse effects of the medication.  Discussed the proper administration of the medication.\par \par Follow up on Friday for catheter removal and void trial.

## 2020-11-25 NOTE — HISTORY OF PRESENT ILLNESS
[FreeTextEntry1] : s/p Left lap partial nephrectomy 11/23\par Returns today with complaints of inability to void overnight.\par Minimal hematuria.  No N/V, flank pain.\par Incisions c/d/i.

## 2020-11-25 NOTE — PHYSICAL EXAM
[General Appearance - In No Acute Distress] : no acute distress [Abdomen Soft] : soft [Abdomen Tenderness] : non-tender [Costovertebral Angle Tenderness] : no ~M costovertebral angle tenderness [FreeTextEntry1] : Bladder palpable.  PVR >600

## 2020-11-27 ENCOUNTER — APPOINTMENT (OUTPATIENT)
Dept: UROLOGY | Facility: CLINIC | Age: 75
End: 2020-11-27
Payer: MEDICARE

## 2020-11-27 VITALS — DIASTOLIC BLOOD PRESSURE: 65 MMHG | HEART RATE: 86 BPM | SYSTOLIC BLOOD PRESSURE: 102 MMHG

## 2020-11-27 DIAGNOSIS — R33.9 RETENTION OF URINE, UNSPECIFIED: ICD-10-CM

## 2020-11-27 PROCEDURE — 99024 POSTOP FOLLOW-UP VISIT: CPT

## 2020-11-27 NOTE — ASSESSMENT
[FreeTextEntry1] : Returned to office, unable to void. PVR 250ml. patient taught CIC. Returned demonstrated CIC  and verbalized understanding. \par Returns to office December 7th for post-op visit.

## 2020-11-27 NOTE — HISTORY OF PRESENT ILLNESS
[FreeTextEntry1] : S/P left lap nephrectomy 11/23\par Developed post-op urinary retention. Booker catheter placed\par \par Presents to the office today for TOV\par urine in drainage bag clear. \par 180 ml of sterile water infused into bladder \par Patient voided a small amount\par PVR 129ml \par Patient advised to walk around, void and return to office for PVR

## 2020-12-02 ENCOUNTER — NON-APPOINTMENT (OUTPATIENT)
Age: 75
End: 2020-12-02

## 2020-12-04 LAB — SURGICAL PATHOLOGY STUDY: SIGNIFICANT CHANGE UP

## 2020-12-07 ENCOUNTER — APPOINTMENT (OUTPATIENT)
Dept: PSYCHIATRY | Facility: CLINIC | Age: 75
End: 2020-12-07
Payer: MEDICARE

## 2020-12-07 ENCOUNTER — APPOINTMENT (OUTPATIENT)
Dept: UROLOGY | Facility: CLINIC | Age: 75
End: 2020-12-07
Payer: MEDICARE

## 2020-12-07 DIAGNOSIS — F32.9 MAJOR DEPRESSIVE DISORDER, SINGLE EPISODE, UNSPECIFIED: ICD-10-CM

## 2020-12-07 DIAGNOSIS — F43.20 ADJUSTMENT DISORDER, UNSPECIFIED: ICD-10-CM

## 2020-12-07 DIAGNOSIS — F41.9 ANXIETY DISORDER, UNSPECIFIED: ICD-10-CM

## 2020-12-07 DIAGNOSIS — F41.0 PANIC DISORDER [EPISODIC PAROXYSMAL ANXIETY]: ICD-10-CM

## 2020-12-07 PROCEDURE — 51798 US URINE CAPACITY MEASURE: CPT

## 2020-12-07 PROCEDURE — 99214 OFFICE O/P EST MOD 30 MIN: CPT | Mod: 25

## 2020-12-07 PROCEDURE — 99214 OFFICE O/P EST MOD 30 MIN: CPT

## 2020-12-07 RX ORDER — OXYCODONE AND ACETAMINOPHEN 5; 325 MG/1; MG/1
5-325 TABLET ORAL
Qty: 15 | Refills: 0 | Status: DISCONTINUED | COMMUNITY
Start: 2018-05-09 | End: 2020-12-07

## 2020-12-07 RX ORDER — CEPHALEXIN 500 MG/1
500 CAPSULE ORAL
Qty: 14 | Refills: 0 | Status: DISCONTINUED | COMMUNITY
Start: 2018-05-09 | End: 2020-12-07

## 2020-12-07 RX ORDER — CEFADROXIL 500 MG/1
500 CAPSULE ORAL
Qty: 10 | Refills: 0 | Status: DISCONTINUED | COMMUNITY
Start: 2018-05-07 | End: 2020-12-07

## 2020-12-07 NOTE — ASSESSMENT
[FreeTextEntry1] : He is doing well physically . Wounds are all healing nicely with no evidence of infection . Steri strips removed\par Psychologically he is ion a moderate depression . He was able to do CIC and then void on his own .For the past 2 weeks he did not go out and according to his wife , he was either in bed or lying on the couch . He saw his psychiatrist this morning who change and adjusted some of his medication .\par I reinforced that his bladder is now function , his pathology is good and he needs to go back to living as he did prior . His wife is upset that he drinks 2 oz of Vodka every  night . I explained that alcohol can cause dehydration and he should avoid alcohol until his new medication is in his bloodstream . If he does drink he should hydrate well in between glasses of alcohol . In regards to coffee .If he cannot limit his intake he needs to hydrate .Reassured  He will be going to [place] on Thursday . He will bring his CIC catheters to [place] .\par PVR is 85 cc

## 2020-12-07 NOTE — PHYSICAL EXAM
[General Appearance - Well Developed] : well developed [General Appearance - Well Nourished] : well nourished [Abdomen Soft] : soft [Skin Color & Pigmentation] : normal skin color and pigmentation [Heart Rate And Rhythm] : Heart rate and rhythm were normal [] : no respiratory distress [Normal Station and Gait] : the gait and station were normal for the patient's age [FreeTextEntry1] : fine tremors hands

## 2020-12-07 NOTE — HISTORY OF PRESENT ILLNESS
[Urinary Frequency] : urinary frequency [Nocturia] : nocturia [FreeTextEntry1] : History of a left partial nephrectomy on 11/23/2012 for RCC pT1a FG 2 .Post op he was not able to void He failed TOV x 2 I taught him CIC . He did CIC for approximately 10 days  and then started to void on his own .

## 2020-12-08 ENCOUNTER — APPOINTMENT (OUTPATIENT)
Dept: UROLOGY | Facility: CLINIC | Age: 75
End: 2020-12-08

## 2020-12-10 NOTE — H&P PST ADULT - FALL HARM RISK CONCLUSION
Discharged instructions reviewed with Patient; Patient verbalized understanding of plan of care.  GRACE Gaona RN     Universal Safety Interventions

## 2021-01-11 RX ORDER — CLONAZEPAM 0.5 MG/1
0.5 TABLET ORAL
Qty: 90 | Refills: 0 | Status: ACTIVE | COMMUNITY
Start: 2018-02-27 | End: 1900-01-01

## 2021-01-11 RX ORDER — CITALOPRAM HYDROBROMIDE 40 MG/1
40 TABLET, FILM COATED ORAL DAILY
Qty: 30 | Refills: 0 | Status: DISCONTINUED | COMMUNITY
Start: 2020-12-07 | End: 2021-01-11

## 2021-01-11 RX ORDER — QUETIAPINE FUMARATE 50 MG/1
50 TABLET ORAL
Qty: 180 | Refills: 0 | Status: ACTIVE | COMMUNITY
Start: 2018-03-01 | End: 1900-01-01

## 2021-01-15 ENCOUNTER — APPOINTMENT (OUTPATIENT)
Dept: UROLOGY | Facility: CLINIC | Age: 76
End: 2021-01-15

## 2021-02-07 ENCOUNTER — RX RENEWAL (OUTPATIENT)
Age: 76
End: 2021-02-07

## 2021-02-08 RX ORDER — ALFUZOSIN HYDROCHLORIDE 10 MG/1
10 TABLET, EXTENDED RELEASE ORAL DAILY
Qty: 90 | Refills: 3 | Status: ACTIVE | COMMUNITY
Start: 2020-11-25 | End: 1900-01-01

## 2021-03-08 ENCOUNTER — APPOINTMENT (OUTPATIENT)
Dept: PSYCHIATRY | Facility: CLINIC | Age: 76
End: 2021-03-08

## 2021-05-03 ENCOUNTER — APPOINTMENT (OUTPATIENT)
Dept: CT IMAGING | Facility: CLINIC | Age: 76
End: 2021-05-03
Payer: MEDICARE

## 2021-05-03 ENCOUNTER — OUTPATIENT (OUTPATIENT)
Dept: OUTPATIENT SERVICES | Facility: HOSPITAL | Age: 76
LOS: 1 days | End: 2021-05-03
Payer: MEDICARE

## 2021-05-03 DIAGNOSIS — Z98.890 OTHER SPECIFIED POSTPROCEDURAL STATES: Chronic | ICD-10-CM

## 2021-05-03 DIAGNOSIS — N28.89 OTHER SPECIFIED DISORDERS OF KIDNEY AND URETER: ICD-10-CM

## 2021-05-03 PROCEDURE — 74170 CT ABD WO CNTRST FLWD CNTRST: CPT | Mod: 26,MG

## 2021-05-03 PROCEDURE — 82565 ASSAY OF CREATININE: CPT

## 2021-05-03 PROCEDURE — 74170 CT ABD WO CNTRST FLWD CNTRST: CPT

## 2021-05-03 PROCEDURE — G1004: CPT

## 2021-05-05 ENCOUNTER — OUTPATIENT (OUTPATIENT)
Dept: OUTPATIENT SERVICES | Facility: HOSPITAL | Age: 76
LOS: 1 days | Discharge: HOME | End: 2021-05-05
Payer: MEDICARE

## 2021-05-05 DIAGNOSIS — Z98.890 OTHER SPECIFIED POSTPROCEDURAL STATES: Chronic | ICD-10-CM

## 2021-05-05 PROCEDURE — 90792 PSYCH DIAG EVAL W/MED SRVCS: CPT | Mod: 95

## 2021-05-06 DIAGNOSIS — F41.1 GENERALIZED ANXIETY DISORDER: ICD-10-CM

## 2021-05-11 ENCOUNTER — APPOINTMENT (OUTPATIENT)
Dept: UROLOGY | Facility: CLINIC | Age: 76
End: 2021-05-11
Payer: MEDICARE

## 2021-05-11 DIAGNOSIS — N28.1 CYST OF KIDNEY, ACQUIRED: ICD-10-CM

## 2021-05-11 PROCEDURE — 99214 OFFICE O/P EST MOD 30 MIN: CPT

## 2021-05-14 LAB — BACTERIA UR CULT: ABNORMAL

## 2021-05-14 RX ORDER — CIPROFLOXACIN HYDROCHLORIDE 500 MG/1
500 TABLET, FILM COATED ORAL
Qty: 10 | Refills: 1 | Status: COMPLETED | COMMUNITY
Start: 2021-05-14 | End: 2021-05-24

## 2021-05-19 ENCOUNTER — APPOINTMENT (OUTPATIENT)
Dept: NEUROLOGY | Facility: CLINIC | Age: 76
End: 2021-05-19
Payer: MEDICARE

## 2021-05-19 PROCEDURE — 96137 PSYCL/NRPSYC TST PHY/QHP EA: CPT

## 2021-05-19 PROCEDURE — 96132 NRPSYC TST EVAL PHYS/QHP 1ST: CPT

## 2021-05-19 PROCEDURE — 96116 NUBHVL XM PHYS/QHP 1ST HR: CPT

## 2021-05-19 PROCEDURE — 96136 PSYCL/NRPSYC TST PHY/QHP 1ST: CPT

## 2021-05-19 PROCEDURE — 96121 NUBHVL XM PHY/QHP EA ADDL HR: CPT

## 2021-06-15 PROCEDURE — 99214 OFFICE O/P EST MOD 30 MIN: CPT | Mod: 95

## 2021-07-16 PROCEDURE — 99214 OFFICE O/P EST MOD 30 MIN: CPT | Mod: 95

## 2021-08-13 PROCEDURE — 99214 OFFICE O/P EST MOD 30 MIN: CPT | Mod: 95

## 2021-08-27 ENCOUNTER — NON-APPOINTMENT (OUTPATIENT)
Age: 76
End: 2021-08-27

## 2021-09-01 ENCOUNTER — APPOINTMENT (OUTPATIENT)
Dept: ORTHOPEDIC SURGERY | Facility: CLINIC | Age: 76
End: 2021-09-01
Payer: MEDICARE

## 2021-09-01 VITALS
HEART RATE: 80 BPM | HEIGHT: 71 IN | BODY MASS INDEX: 25.2 KG/M2 | SYSTOLIC BLOOD PRESSURE: 110 MMHG | DIASTOLIC BLOOD PRESSURE: 72 MMHG | WEIGHT: 180 LBS

## 2021-09-01 PROCEDURE — 99204 OFFICE O/P NEW MOD 45 MIN: CPT

## 2021-09-21 ENCOUNTER — OUTPATIENT (OUTPATIENT)
Dept: OUTPATIENT SERVICES | Facility: HOSPITAL | Age: 76
LOS: 1 days | End: 2021-09-21
Payer: MEDICARE

## 2021-09-21 ENCOUNTER — APPOINTMENT (OUTPATIENT)
Dept: CT IMAGING | Facility: CLINIC | Age: 76
End: 2021-09-21
Payer: MEDICARE

## 2021-09-21 DIAGNOSIS — Z98.890 OTHER SPECIFIED POSTPROCEDURAL STATES: Chronic | ICD-10-CM

## 2021-09-21 DIAGNOSIS — Z00.8 ENCOUNTER FOR OTHER GENERAL EXAMINATION: ICD-10-CM

## 2021-09-21 PROCEDURE — 64493 INJ PARAVERT F JNT L/S 1 LEV: CPT

## 2021-09-21 PROCEDURE — 64493 INJ PARAVERT F JNT L/S 1 LEV: CPT | Mod: LT

## 2021-09-24 PROCEDURE — 99214 OFFICE O/P EST MOD 30 MIN: CPT | Mod: 95

## 2021-09-27 ENCOUNTER — APPOINTMENT (OUTPATIENT)
Dept: ORTHOPEDIC SURGERY | Facility: CLINIC | Age: 76
End: 2021-09-27
Payer: MEDICARE

## 2021-09-27 PROCEDURE — 99214 OFFICE O/P EST MOD 30 MIN: CPT | Mod: 25

## 2021-09-27 PROCEDURE — 20552 NJX 1/MLT TRIGGER POINT 1/2: CPT

## 2021-10-22 ENCOUNTER — APPOINTMENT (OUTPATIENT)
Dept: ORTHOPEDIC SURGERY | Facility: CLINIC | Age: 76
End: 2021-10-22
Payer: MEDICARE

## 2021-10-22 VITALS — BODY MASS INDEX: 23.24 KG/M2 | HEIGHT: 71 IN | WEIGHT: 166 LBS

## 2021-10-22 PROCEDURE — 20553 NJX 1/MLT TRIGGER POINTS 3/>: CPT

## 2021-10-22 PROCEDURE — 99214 OFFICE O/P EST MOD 30 MIN: CPT | Mod: 25

## 2021-10-22 RX ORDER — DICLOFENAC SODIUM 75 MG/1
75 TABLET, DELAYED RELEASE ORAL
Qty: 90 | Refills: 0 | Status: ACTIVE | COMMUNITY
Start: 2021-10-22 | End: 1900-01-01

## 2021-10-29 PROCEDURE — 99214 OFFICE O/P EST MOD 30 MIN: CPT | Mod: 95

## 2021-11-30 PROCEDURE — 99214 OFFICE O/P EST MOD 30 MIN: CPT | Mod: 95

## 2021-12-01 ENCOUNTER — APPOINTMENT (OUTPATIENT)
Dept: ORTHOPEDIC SURGERY | Facility: CLINIC | Age: 76
End: 2021-12-01
Payer: MEDICARE

## 2021-12-01 VITALS
HEIGHT: 71 IN | BODY MASS INDEX: 23.24 KG/M2 | HEART RATE: 75 BPM | WEIGHT: 166 LBS | SYSTOLIC BLOOD PRESSURE: 128 MMHG | DIASTOLIC BLOOD PRESSURE: 68 MMHG

## 2021-12-01 PROCEDURE — 99214 OFFICE O/P EST MOD 30 MIN: CPT | Mod: 25

## 2021-12-01 PROCEDURE — 20552 NJX 1/MLT TRIGGER POINT 1/2: CPT

## 2022-01-05 PROCEDURE — 99214 OFFICE O/P EST MOD 30 MIN: CPT | Mod: 95

## 2022-01-06 PROCEDURE — 90834 PSYTX W PT 45 MINUTES: CPT | Mod: 95

## 2022-02-08 PROCEDURE — 99214 OFFICE O/P EST MOD 30 MIN: CPT | Mod: 95

## 2022-03-01 PROCEDURE — 90834 PSYTX W PT 45 MINUTES: CPT | Mod: 95

## 2022-03-15 PROCEDURE — 99214 OFFICE O/P EST MOD 30 MIN: CPT | Mod: 95

## 2022-03-31 ENCOUNTER — APPOINTMENT (OUTPATIENT)
Dept: ORTHOPEDIC SURGERY | Facility: CLINIC | Age: 77
End: 2022-03-31
Payer: MEDICARE

## 2022-03-31 PROCEDURE — 99214 OFFICE O/P EST MOD 30 MIN: CPT | Mod: 95

## 2022-04-13 PROCEDURE — 99214 OFFICE O/P EST MOD 30 MIN: CPT | Mod: 95

## 2022-05-08 PROBLEM — M54.50 LUMBAGO: Status: ACTIVE | Noted: 2021-09-01

## 2022-05-08 PROBLEM — M54.16 LUMBAR RADICULOPATHY: Status: ACTIVE | Noted: 2021-12-01

## 2022-05-11 PROCEDURE — 90834 PSYTX W PT 45 MINUTES: CPT | Mod: 95

## 2022-05-12 ENCOUNTER — APPOINTMENT (OUTPATIENT)
Dept: ORTHOPEDIC SURGERY | Facility: CLINIC | Age: 77
End: 2022-05-12
Payer: MEDICARE

## 2022-05-12 VITALS
DIASTOLIC BLOOD PRESSURE: 70 MMHG | WEIGHT: 168 LBS | HEART RATE: 68 BPM | HEIGHT: 71 IN | BODY MASS INDEX: 23.52 KG/M2 | SYSTOLIC BLOOD PRESSURE: 124 MMHG

## 2022-05-12 DIAGNOSIS — M54.16 RADICULOPATHY, LUMBAR REGION: ICD-10-CM

## 2022-05-12 DIAGNOSIS — M54.50 LOW BACK PAIN, UNSPECIFIED: ICD-10-CM

## 2022-05-12 PROCEDURE — 99214 OFFICE O/P EST MOD 30 MIN: CPT | Mod: 25

## 2022-05-12 PROCEDURE — 72100 X-RAY EXAM L-S SPINE 2/3 VWS: CPT

## 2022-05-12 PROCEDURE — 20552 NJX 1/MLT TRIGGER POINT 1/2: CPT

## 2022-05-12 NOTE — HISTORY OF PRESENT ILLNESS
[Stable] : stable [Walking] : walking [Rest] : relieved by rest [de-identified] : 76 year old male  presenting for follow up evaluation of low lumbar spine pain.\par He developed right leg pain over 1 year ago with out any injury. \par Left leg pain started in September has been progressively getting worse. complains of pain/ difficulty walking \par wishes to discuss surgery . \par No fever chills sweats nausea vomiting no bowel or bladder dysfunction, no recent weight loss or gain no night pain. This history is in addition to the intake form that I personally reviewed.

## 2022-05-12 NOTE — DISCUSSION/SUMMARY
[de-identified] : Lumbar degenerative disc disease.\par L4-5 stenosis.\par Right sacroiliitis.\par Discussed all options. \par Risks of surgery include infection, dural tear, nerve root injury, reherniation, future back pain, future leg pain, retained fragment, hematoma, urinary retention, worsening leg symptoms, footdrop, inability to place hardware, hardware breakage, nonunion, adjacent level breakdown requiring surgery, anesthetic risks, blood transfusion risks, positioning pain, visceral and vascular injury, deep vein thrombosis, pulmonary embolus, and death. Somatosensory evoked potentials and EMG monitoring will be used. Patient will need spinal orthosis pre and post operatively. All risks were explained not exclusive to the ones mentioned alternatives were discussed and all questions were answered the patient agrees and understands the above and is in complete agreement with the plan. \par MRI lumbar.\par All options discussed and patient involved in decision making process including rest, medicine, home exercise, acupuncture, Chiropractic care, Physical Therapy, Pain management, and last resort surgery. All questions were answered, all alternatives discussed and the patient is in complete agreement with that plan. Follow-up appointment as instructed. Any issues and the patient will call or come in sooner.\par Wife agrees with the plan. \par

## 2022-05-12 NOTE — PHYSICAL EXAM
[Normal] : Gait: normal [Painter's Sign] : negative Painter's sign [Pronator Drift] : negative pronator drift [SLR] : negative straight leg raise [de-identified] : 5 out of 5 motor strength, sensation is intact and symmetrical limited range of motion flexion extension and rotation, no palpatory tenderness full range of motion of hips knees shoulders and elbows (all four extremities), no atrophy, negative straight leg raise, no swelling, normal ambulation, no apparent distress skin is intact, no upper or lower extremity instability, alert and oriented x 3 and normal mood. Normal finger-to nose test. \par Pain over right troch area.Walks antalgic. [de-identified] : XR AP Lat Lumbar 05/12/2022-minimal L4-5 spondylolisthesis, mild right hip arthritis-reviewed with patient.

## 2022-05-13 ENCOUNTER — APPOINTMENT (OUTPATIENT)
Dept: UROLOGY | Facility: CLINIC | Age: 77
End: 2022-05-13

## 2022-05-18 PROCEDURE — 99214 OFFICE O/P EST MOD 30 MIN: CPT | Mod: 95

## 2022-05-18 PROCEDURE — 90834 PSYTX W PT 45 MINUTES: CPT | Mod: 95

## 2022-05-22 ENCOUNTER — OUTPATIENT (OUTPATIENT)
Dept: OUTPATIENT SERVICES | Facility: HOSPITAL | Age: 77
LOS: 1 days | End: 2022-05-22
Payer: MEDICARE

## 2022-05-22 ENCOUNTER — APPOINTMENT (OUTPATIENT)
Dept: MRI IMAGING | Facility: CLINIC | Age: 77
End: 2022-05-22
Payer: MEDICARE

## 2022-05-22 DIAGNOSIS — Z98.890 OTHER SPECIFIED POSTPROCEDURAL STATES: Chronic | ICD-10-CM

## 2022-05-22 DIAGNOSIS — M51.36 OTHER INTERVERTEBRAL DISC DEGENERATION, LUMBAR REGION: ICD-10-CM

## 2022-05-22 PROCEDURE — G1004: CPT

## 2022-05-22 PROCEDURE — 72148 MRI LUMBAR SPINE W/O DYE: CPT | Mod: ME

## 2022-05-22 PROCEDURE — 72148 MRI LUMBAR SPINE W/O DYE: CPT | Mod: 26,ME

## 2022-05-24 ENCOUNTER — APPOINTMENT (OUTPATIENT)
Dept: UROLOGY | Facility: CLINIC | Age: 77
End: 2022-05-24
Payer: MEDICARE

## 2022-05-24 ENCOUNTER — OUTPATIENT (OUTPATIENT)
Dept: OUTPATIENT SERVICES | Facility: HOSPITAL | Age: 77
LOS: 1 days | End: 2022-05-24
Payer: MEDICARE

## 2022-05-24 VITALS
WEIGHT: 163 LBS | HEART RATE: 72 BPM | SYSTOLIC BLOOD PRESSURE: 144 MMHG | TEMPERATURE: 98 F | RESPIRATION RATE: 17 BRPM | DIASTOLIC BLOOD PRESSURE: 81 MMHG | BODY MASS INDEX: 22.82 KG/M2 | HEIGHT: 71 IN

## 2022-05-24 DIAGNOSIS — Z98.890 OTHER SPECIFIED POSTPROCEDURAL STATES: Chronic | ICD-10-CM

## 2022-05-24 DIAGNOSIS — R35.0 FREQUENCY OF MICTURITION: ICD-10-CM

## 2022-05-24 PROCEDURE — 76775 US EXAM ABDO BACK WALL LIM: CPT

## 2022-05-24 PROCEDURE — 99213 OFFICE O/P EST LOW 20 MIN: CPT

## 2022-05-24 PROCEDURE — 76775 US EXAM ABDO BACK WALL LIM: CPT | Mod: 26

## 2022-05-24 NOTE — PHYSICAL EXAM
[General Appearance - Well Developed] : well developed [General Appearance - Well Nourished] : well nourished [Skin Color & Pigmentation] : normal skin color and pigmentation [Skin Turgor] : supple [Heart Rate And Rhythm] : Heart rate and rhythm were normal [] : no respiratory distress [Oriented To Time, Place, And Person] : oriented to person, place, and time [Normal Station and Gait] : the gait and station were normal for the patient's age [No Focal Deficits] : no focal deficits [No Palpable Adenopathy] : no palpable adenopathy

## 2022-05-24 NOTE — H&P PST ADULT - SUPRACLAVICULAR R
normal Rhofade Counseling: Rhofade is a topical medication which can decrease superficial blood flow where applied. Side effects are uncommon and include stinging, redness and allergic reactions.

## 2022-05-24 NOTE — ASSESSMENT
[FreeTextEntry1] : Renal ultrasound done today No evidence of recurrence . He is doing well No urological complaints . He is having a cyst removed from his spine next month with Dr Rodriguez .\par Follow up next year with renal ultrasound \par  I spent 20 -minutes time today on all issues related to this encounter on today date of service including non face to face time.\par \par

## 2022-05-24 NOTE — HISTORY OF PRESENT ILLNESS
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 He is doing well . He sees Dr Ruelas  for general urology

## 2022-06-01 PROCEDURE — 90834 PSYTX W PT 45 MINUTES: CPT | Mod: 95

## 2022-06-07 DIAGNOSIS — C64.9 MALIGNANT NEOPLASM OF UNSPECIFIED KIDNEY, EXCEPT RENAL PELVIS: ICD-10-CM

## 2022-06-15 PROCEDURE — 90834 PSYTX W PT 45 MINUTES: CPT | Mod: 95

## 2022-06-21 PROCEDURE — 99443: CPT | Mod: 95

## 2022-06-27 ENCOUNTER — OUTPATIENT (OUTPATIENT)
Dept: OUTPATIENT SERVICES | Facility: HOSPITAL | Age: 77
LOS: 1 days | End: 2022-06-27

## 2022-06-27 VITALS
DIASTOLIC BLOOD PRESSURE: 78 MMHG | TEMPERATURE: 98 F | RESPIRATION RATE: 16 BRPM | WEIGHT: 164.91 LBS | HEART RATE: 64 BPM | HEIGHT: 68 IN | OXYGEN SATURATION: 98 % | SYSTOLIC BLOOD PRESSURE: 124 MMHG

## 2022-06-27 DIAGNOSIS — M54.16 RADICULOPATHY, LUMBAR REGION: ICD-10-CM

## 2022-06-27 DIAGNOSIS — Z98.890 OTHER SPECIFIED POSTPROCEDURAL STATES: Chronic | ICD-10-CM

## 2022-06-27 DIAGNOSIS — M48.07 SPINAL STENOSIS, LUMBOSACRAL REGION: ICD-10-CM

## 2022-06-27 DIAGNOSIS — Z90.5 ACQUIRED ABSENCE OF KIDNEY: Chronic | ICD-10-CM

## 2022-06-27 DIAGNOSIS — I10 ESSENTIAL (PRIMARY) HYPERTENSION: ICD-10-CM

## 2022-06-27 DIAGNOSIS — Z91.89 OTHER SPECIFIED PERSONAL RISK FACTORS, NOT ELSEWHERE CLASSIFIED: ICD-10-CM

## 2022-06-27 DIAGNOSIS — F41.9 ANXIETY DISORDER, UNSPECIFIED: ICD-10-CM

## 2022-06-27 DIAGNOSIS — E03.9 HYPOTHYROIDISM, UNSPECIFIED: ICD-10-CM

## 2022-06-27 DIAGNOSIS — M43.16 SPONDYLOLISTHESIS, LUMBAR REGION: ICD-10-CM

## 2022-06-27 LAB
ALBUMIN SERPL ELPH-MCNC: 4.7 G/DL — SIGNIFICANT CHANGE UP (ref 3.3–5)
ALP SERPL-CCNC: 49 U/L — SIGNIFICANT CHANGE UP (ref 40–120)
ALT FLD-CCNC: 17 U/L — SIGNIFICANT CHANGE UP (ref 4–41)
ANION GAP SERPL CALC-SCNC: 11 MMOL/L — SIGNIFICANT CHANGE UP (ref 7–14)
AST SERPL-CCNC: 19 U/L — SIGNIFICANT CHANGE UP (ref 4–40)
BILIRUB SERPL-MCNC: 0.4 MG/DL — SIGNIFICANT CHANGE UP (ref 0.2–1.2)
BLD GP AB SCN SERPL QL: NEGATIVE — SIGNIFICANT CHANGE UP
BUN SERPL-MCNC: 22 MG/DL — SIGNIFICANT CHANGE UP (ref 7–23)
CALCIUM SERPL-MCNC: 9.3 MG/DL — SIGNIFICANT CHANGE UP (ref 8.4–10.5)
CHLORIDE SERPL-SCNC: 105 MMOL/L — SIGNIFICANT CHANGE UP (ref 98–107)
CO2 SERPL-SCNC: 27 MMOL/L — SIGNIFICANT CHANGE UP (ref 22–31)
CREAT SERPL-MCNC: 0.8 MG/DL — SIGNIFICANT CHANGE UP (ref 0.5–1.3)
EGFR: 91 ML/MIN/1.73M2 — SIGNIFICANT CHANGE UP
GLUCOSE SERPL-MCNC: 94 MG/DL — SIGNIFICANT CHANGE UP (ref 70–99)
HCT VFR BLD CALC: 43.4 % — SIGNIFICANT CHANGE UP (ref 39–50)
HGB BLD-MCNC: 14.3 G/DL — SIGNIFICANT CHANGE UP (ref 13–17)
MCHC RBC-ENTMCNC: 30.5 PG — SIGNIFICANT CHANGE UP (ref 27–34)
MCHC RBC-ENTMCNC: 32.9 GM/DL — SIGNIFICANT CHANGE UP (ref 32–36)
MCV RBC AUTO: 92.5 FL — SIGNIFICANT CHANGE UP (ref 80–100)
MRSA PCR RESULT.: SIGNIFICANT CHANGE UP
NRBC # BLD: 0 /100 WBCS — SIGNIFICANT CHANGE UP
NRBC # FLD: 0 K/UL — SIGNIFICANT CHANGE UP
PLATELET # BLD AUTO: 215 K/UL — SIGNIFICANT CHANGE UP (ref 150–400)
POTASSIUM SERPL-MCNC: 4.2 MMOL/L — SIGNIFICANT CHANGE UP (ref 3.5–5.3)
POTASSIUM SERPL-SCNC: 4.2 MMOL/L — SIGNIFICANT CHANGE UP (ref 3.5–5.3)
PROT SERPL-MCNC: 7.7 G/DL — SIGNIFICANT CHANGE UP (ref 6–8.3)
RBC # BLD: 4.69 M/UL — SIGNIFICANT CHANGE UP (ref 4.2–5.8)
RBC # FLD: 15.2 % — HIGH (ref 10.3–14.5)
RH IG SCN BLD-IMP: POSITIVE — SIGNIFICANT CHANGE UP
S AUREUS DNA NOSE QL NAA+PROBE: SIGNIFICANT CHANGE UP
SODIUM SERPL-SCNC: 143 MMOL/L — SIGNIFICANT CHANGE UP (ref 135–145)
WBC # BLD: 5.89 K/UL — SIGNIFICANT CHANGE UP (ref 3.8–10.5)
WBC # FLD AUTO: 5.89 K/UL — SIGNIFICANT CHANGE UP (ref 3.8–10.5)

## 2022-06-27 PROCEDURE — 93010 ELECTROCARDIOGRAM REPORT: CPT

## 2022-06-27 RX ORDER — LEVOTHYROXINE SODIUM 125 MCG
1 TABLET ORAL
Qty: 0 | Refills: 0 | DISCHARGE

## 2022-06-27 RX ORDER — CLONAZEPAM 1 MG
0 TABLET ORAL
Qty: 0 | Refills: 0 | DISCHARGE

## 2022-06-27 RX ORDER — ROSUVASTATIN CALCIUM 5 MG/1
1 TABLET ORAL
Qty: 0 | Refills: 0 | DISCHARGE

## 2022-06-27 RX ORDER — METOPROLOL TARTRATE 50 MG
1 TABLET ORAL
Qty: 0 | Refills: 0 | DISCHARGE

## 2022-06-27 RX ORDER — ASPIRIN/CALCIUM CARB/MAGNESIUM 324 MG
1 TABLET ORAL
Qty: 0 | Refills: 0 | DISCHARGE

## 2022-06-27 RX ORDER — MULTIVIT-MIN/FERROUS GLUCONATE 9 MG/15 ML
1 LIQUID (ML) ORAL
Qty: 0 | Refills: 0 | DISCHARGE

## 2022-06-27 RX ORDER — IRBESARTAN 75 MG/1
1 TABLET ORAL
Qty: 0 | Refills: 0 | DISCHARGE

## 2022-06-27 RX ORDER — AMLODIPINE BESYLATE 2.5 MG/1
1 TABLET ORAL
Qty: 0 | Refills: 0 | DISCHARGE

## 2022-06-27 RX ORDER — MIRTAZAPINE 45 MG/1
1 TABLET, ORALLY DISINTEGRATING ORAL
Qty: 0 | Refills: 0 | DISCHARGE

## 2022-06-27 NOTE — H&P PST ADULT - MUSCULOSKELETAL COMMENTS
Pre op dx- radiculopathy lumbar region MRI Lumbar spine  05/12/2021 showed  L4- 5 spinal stenosis, synovial cyst-with moderate disc degeneration radiating down to S1

## 2022-06-27 NOTE — H&P PST ADULT - ASSESSMENT
pre  op dx of radiculopathy lumbar region is scheduled for L4- L5 lumbar laminectomy. Radiculopathy lumbar region

## 2022-06-27 NOTE — H&P PST ADULT - EXTREMITIES EXAM
I called pharmacy and they stated that the 150 MG dose of Effexor was cancelled  However they checked again and are able to fill script for 30 day supply only due to insurance  I called Elver Pimentel back and informed her that scripts were clarified and pharmacy is filling them for her  no clubbing/no cyanosis

## 2022-06-27 NOTE — H&P PST ADULT - NSICDXPASTSURGICALHX_GEN_ALL_CORE_FT
PAST SURGICAL HISTORY:  H/O melanoma excision right shoulder in 2015    H/O needle biopsy of prostate    S/P wrist surgery left wrist, 30 years ago with hardware placement and excision of hardware months later     PAST SURGICAL HISTORY:  H/O left nephrectomy Partial    H/O melanoma excision right shoulder in 2015    H/O needle biopsy of prostate    S/P wrist surgery left wrist, 30 years ago with hardware placement and excision of hardware months later

## 2022-06-27 NOTE — H&P PST ADULT - ACTIVITY
plays tennis, Walks 1 to 2 blocks, climbs 1-3  flight of stairs, ADLs Plays tennis, Walks 1 to 2 blocks, climbs 1-3  flight of stairs, ADLs

## 2022-06-27 NOTE — H&P PST ADULT - DOCUMENT STATUS
Still symptomatic.  Continue Tylenol 3 as needed.  Referred to physical therapy.   Authored by Resident/PA/NP

## 2022-06-27 NOTE — H&P PST ADULT - PROBLEM SELECTOR PLAN 1
Patient tentatively scheduled for L4- L5 lumbar laminectomy on 07/12/2022.  Pre-op instructions provided. Pt given verbal and written instructions with teach back on chlorhexidine wash and pepcid. Pt verbalized understanding with return demonstration.   Pt strongly advised to follow up with surgeon to discuss COVID testing requirements prior to procedure.

## 2022-06-27 NOTE — H&P PST ADULT - NSANTHAGERD_ENT_A_CORE
Detail Level: Zone
Sunscreen Recommendations: At least SPF 30 (or 50 for fair skin patients or those with h/o of skin cancer)\\nPhysical UV blockers such as those containing one or both of the following ingredients: Zinc oxide or titanium dioxide
Yes

## 2022-06-27 NOTE — H&P PST ADULT - MS GEN HX ROS MEA POS PC
"I have a cyst in my spinal area"/back pain "I have a cyst in my spinal area since last 9 months "/back pain

## 2022-06-27 NOTE — H&P PST ADULT - NSICDXPASTMEDICALHX_GEN_ALL_CORE_FT
PAST MEDICAL HISTORY:  Former smoker     HLD (hyperlipidemia)     HTN (hypertension)     Renal cancer, left     Situational anxiety      PAST MEDICAL HISTORY:  Former smoker     HLD (hyperlipidemia)     HTN (hypertension)     Radiculopathy, lumbar region     Renal cancer, left     Renal cyst     Situational anxiety     Spondylolisthesis, lumbar region

## 2022-06-27 NOTE — H&P PST ADULT - HISTORY OF PRESENT ILLNESS
76 year old male with pre  op dx of radiculopathy lumbar region is scheduled for L4- L5 lumbar laminectomy.

## 2022-06-27 NOTE — H&P PST ADULT - NEGATIVE GENERAL SYMPTOMS
no fever/no chills/no sweating/no weight loss/no weight gain/no polyphagia/no polyuria/no polydipsia/no fatigue no fever/no chills/no sweating/no weight loss

## 2022-06-27 NOTE — H&P PST ADULT - LAST CARDIAC ANGIOGRAM/IMAGING
5-7 years ago at [facility]. denies stent placement 5-7 years ago at [facility] . denies stent placement

## 2022-07-06 PROCEDURE — 90834 PSYTX W PT 45 MINUTES: CPT | Mod: 95

## 2022-07-11 ENCOUNTER — TRANSCRIPTION ENCOUNTER (OUTPATIENT)
Age: 77
End: 2022-07-11

## 2022-07-11 NOTE — ASU PATIENT PROFILE, ADULT - NSICDXPASTMEDICALHX_GEN_ALL_CORE_FT
PAST MEDICAL HISTORY:  Former smoker     HLD (hyperlipidemia)     HTN (hypertension)     Radiculopathy, lumbar region     Renal cancer, left     Renal cyst     Situational anxiety     Spondylolisthesis, lumbar region

## 2022-07-11 NOTE — ASU PATIENT PROFILE, ADULT - FALL HARM RISK - UNIVERSAL INTERVENTIONS
Bed in lowest position, wheels locked, appropriate side rails in place/Call bell, personal items and telephone in reach/Non-slip footwear when patient is out of bed/[place] to call system/Physically safe environment - no spills, clutter or unnecessary equipment/Purposeful Proactive Rounding/Room/bathroom lighting operational, light cord in reach

## 2022-07-11 NOTE — ASU PATIENT PROFILE, ADULT - NSICDXPASTSURGICALHX_GEN_ALL_CORE_FT
PAST SURGICAL HISTORY:  H/O left nephrectomy Partial    H/O melanoma excision right shoulder in 2015    H/O needle biopsy of prostate    S/P wrist surgery left wrist, 30 years ago with hardware placement and excision of hardware months later

## 2022-07-12 ENCOUNTER — TRANSCRIPTION ENCOUNTER (OUTPATIENT)
Age: 77
End: 2022-07-12

## 2022-07-12 ENCOUNTER — INPATIENT (INPATIENT)
Facility: HOSPITAL | Age: 77
LOS: 0 days | Discharge: HOME | End: 2022-07-13
Attending: ORTHOPAEDIC SURGERY | Admitting: ORTHOPAEDIC SURGERY

## 2022-07-12 ENCOUNTER — APPOINTMENT (OUTPATIENT)
Dept: ORTHOPEDIC SURGERY | Facility: HOSPITAL | Age: 77
End: 2022-07-12

## 2022-07-12 ENCOUNTER — RESULT REVIEW (OUTPATIENT)
Age: 77
End: 2022-07-12

## 2022-07-12 VITALS
HEIGHT: 71 IN | RESPIRATION RATE: 18 BRPM | WEIGHT: 169.98 LBS | DIASTOLIC BLOOD PRESSURE: 61 MMHG | SYSTOLIC BLOOD PRESSURE: 104 MMHG | HEART RATE: 68 BPM | TEMPERATURE: 98 F | OXYGEN SATURATION: 97 %

## 2022-07-12 DIAGNOSIS — M54.16 RADICULOPATHY, LUMBAR REGION: ICD-10-CM

## 2022-07-12 DIAGNOSIS — Z98.890 OTHER SPECIFIED POSTPROCEDURAL STATES: Chronic | ICD-10-CM

## 2022-07-12 DIAGNOSIS — Z90.5 ACQUIRED ABSENCE OF KIDNEY: Chronic | ICD-10-CM

## 2022-07-12 LAB
ANION GAP SERPL CALC-SCNC: 11 MMOL/L — SIGNIFICANT CHANGE UP (ref 7–14)
BUN SERPL-MCNC: 23 MG/DL — SIGNIFICANT CHANGE UP (ref 7–23)
CALCIUM SERPL-MCNC: 8.8 MG/DL — SIGNIFICANT CHANGE UP (ref 8.4–10.5)
CHLORIDE SERPL-SCNC: 105 MMOL/L — SIGNIFICANT CHANGE UP (ref 98–107)
CO2 SERPL-SCNC: 25 MMOL/L — SIGNIFICANT CHANGE UP (ref 22–31)
CREAT SERPL-MCNC: 0.91 MG/DL — SIGNIFICANT CHANGE UP (ref 0.5–1.3)
EGFR: 87 ML/MIN/1.73M2 — SIGNIFICANT CHANGE UP
GLUCOSE SERPL-MCNC: 121 MG/DL — HIGH (ref 70–99)
HCT VFR BLD CALC: 35.7 % — LOW (ref 39–50)
HGB BLD-MCNC: 12.2 G/DL — LOW (ref 13–17)
MCHC RBC-ENTMCNC: 32.4 PG — SIGNIFICANT CHANGE UP (ref 27–34)
MCHC RBC-ENTMCNC: 34.2 GM/DL — SIGNIFICANT CHANGE UP (ref 32–36)
MCV RBC AUTO: 94.9 FL — SIGNIFICANT CHANGE UP (ref 80–100)
NRBC # BLD: 0 /100 WBCS — SIGNIFICANT CHANGE UP
NRBC # FLD: 0 K/UL — SIGNIFICANT CHANGE UP
PLATELET # BLD AUTO: 172 K/UL — SIGNIFICANT CHANGE UP (ref 150–400)
POTASSIUM SERPL-MCNC: 4 MMOL/L — SIGNIFICANT CHANGE UP (ref 3.5–5.3)
POTASSIUM SERPL-SCNC: 4 MMOL/L — SIGNIFICANT CHANGE UP (ref 3.5–5.3)
RBC # BLD: 3.76 M/UL — LOW (ref 4.2–5.8)
RBC # FLD: 15.4 % — HIGH (ref 10.3–14.5)
SODIUM SERPL-SCNC: 141 MMOL/L — SIGNIFICANT CHANGE UP (ref 135–145)
WBC # BLD: 5.48 K/UL — SIGNIFICANT CHANGE UP (ref 3.8–10.5)
WBC # FLD AUTO: 5.48 K/UL — SIGNIFICANT CHANGE UP (ref 3.8–10.5)

## 2022-07-12 PROCEDURE — 88311 DECALCIFY TISSUE: CPT | Mod: 26

## 2022-07-12 PROCEDURE — 88304 TISSUE EXAM BY PATHOLOGIST: CPT | Mod: 26

## 2022-07-12 PROCEDURE — 63047 LAM FACETEC & FORAMOT LUMBAR: CPT | Mod: 82,59

## 2022-07-12 PROCEDURE — 63267 EXCISE INTRSPINL LESION LMBR: CPT | Mod: 82

## 2022-07-12 PROCEDURE — 63011 REMOVE SPINE LAMINA 1/2 SCRL: CPT | Mod: 82

## 2022-07-12 PROCEDURE — 63267 EXCISE INTRSPINL LESION LMBR: CPT

## 2022-07-12 PROCEDURE — 63047 LAM FACETEC & FORAMOT LUMBAR: CPT | Mod: 59

## 2022-07-12 PROCEDURE — 72100 X-RAY EXAM L-S SPINE 2/3 VWS: CPT | Mod: 26

## 2022-07-12 PROCEDURE — 63011 REMOVE SPINE LAMINA 1/2 SCRL: CPT

## 2022-07-12 DEVICE — BONE WAX 2.5GM: Type: IMPLANTABLE DEVICE | Status: FUNCTIONAL

## 2022-07-12 DEVICE — SURGIFOAM PAD 8CM X 12.5CM X 2MM (100C): Type: IMPLANTABLE DEVICE | Status: FUNCTIONAL

## 2022-07-12 DEVICE — SURGIFLO MATRIX WITH THROMBIN KIT: Type: IMPLANTABLE DEVICE | Status: FUNCTIONAL

## 2022-07-12 RX ORDER — SENNA PLUS 8.6 MG/1
2 TABLET ORAL AT BEDTIME
Refills: 0 | Status: DISCONTINUED | OUTPATIENT
Start: 2022-07-12 | End: 2022-07-13

## 2022-07-12 RX ORDER — QUETIAPINE FUMARATE 200 MG/1
50 TABLET, FILM COATED ORAL AT BEDTIME
Refills: 0 | Status: DISCONTINUED | OUTPATIENT
Start: 2022-07-12 | End: 2022-07-13

## 2022-07-12 RX ORDER — LOSARTAN POTASSIUM 100 MG/1
25 TABLET, FILM COATED ORAL DAILY
Refills: 0 | Status: DISCONTINUED | OUTPATIENT
Start: 2022-07-12 | End: 2022-07-13

## 2022-07-12 RX ORDER — OXYCODONE HYDROCHLORIDE 5 MG/1
5 TABLET ORAL EVERY 4 HOURS
Refills: 0 | Status: DISCONTINUED | OUTPATIENT
Start: 2022-07-12 | End: 2022-07-13

## 2022-07-12 RX ORDER — ACETAMINOPHEN 500 MG
975 TABLET ORAL EVERY 8 HOURS
Refills: 0 | Status: DISCONTINUED | OUTPATIENT
Start: 2022-07-12 | End: 2022-07-13

## 2022-07-12 RX ORDER — CLONAZEPAM 1 MG
0.5 TABLET ORAL AT BEDTIME
Refills: 0 | Status: DISCONTINUED | OUTPATIENT
Start: 2022-07-12 | End: 2022-07-13

## 2022-07-12 RX ORDER — CYCLOBENZAPRINE HYDROCHLORIDE 10 MG/1
5 TABLET, FILM COATED ORAL EVERY 8 HOURS
Refills: 0 | Status: DISCONTINUED | OUTPATIENT
Start: 2022-07-12 | End: 2022-07-13

## 2022-07-12 RX ORDER — SODIUM CHLORIDE 9 MG/ML
500 INJECTION, SOLUTION INTRAVENOUS ONCE
Refills: 0 | Status: COMPLETED | OUTPATIENT
Start: 2022-07-13 | End: 2022-07-13

## 2022-07-12 RX ORDER — PANTOPRAZOLE SODIUM 20 MG/1
40 TABLET, DELAYED RELEASE ORAL ONCE
Refills: 0 | Status: COMPLETED | OUTPATIENT
Start: 2022-07-12 | End: 2022-07-12

## 2022-07-12 RX ORDER — SODIUM CHLORIDE 9 MG/ML
1000 INJECTION, SOLUTION INTRAVENOUS
Refills: 0 | Status: DISCONTINUED | OUTPATIENT
Start: 2022-07-12 | End: 2022-07-12

## 2022-07-12 RX ORDER — SERTRALINE 25 MG/1
75 TABLET, FILM COATED ORAL DAILY
Refills: 0 | Status: DISCONTINUED | OUTPATIENT
Start: 2022-07-12 | End: 2022-07-13

## 2022-07-12 RX ORDER — TRAMADOL HYDROCHLORIDE 50 MG/1
25 TABLET ORAL EVERY 8 HOURS
Refills: 0 | Status: DISCONTINUED | OUTPATIENT
Start: 2022-07-12 | End: 2022-07-13

## 2022-07-12 RX ORDER — SODIUM CHLORIDE 9 MG/ML
500 INJECTION, SOLUTION INTRAVENOUS ONCE
Refills: 0 | Status: COMPLETED | OUTPATIENT
Start: 2022-07-12 | End: 2022-07-12

## 2022-07-12 RX ORDER — OXYCODONE HYDROCHLORIDE 5 MG/1
2.5 TABLET ORAL EVERY 4 HOURS
Refills: 0 | Status: DISCONTINUED | OUTPATIENT
Start: 2022-07-12 | End: 2022-07-13

## 2022-07-12 RX ORDER — TRAMADOL HYDROCHLORIDE 50 MG/1
25 TABLET ORAL ONCE
Refills: 0 | Status: DISCONTINUED | OUTPATIENT
Start: 2022-07-12 | End: 2022-07-12

## 2022-07-12 RX ORDER — ACETAMINOPHEN 500 MG
975 TABLET ORAL ONCE
Refills: 0 | Status: COMPLETED | OUTPATIENT
Start: 2022-07-12 | End: 2022-07-12

## 2022-07-12 RX ORDER — HYDROMORPHONE HYDROCHLORIDE 2 MG/ML
0.5 INJECTION INTRAMUSCULAR; INTRAVENOUS; SUBCUTANEOUS
Refills: 0 | Status: DISCONTINUED | OUTPATIENT
Start: 2022-07-12 | End: 2022-07-12

## 2022-07-12 RX ORDER — CHOLECALCIFEROL (VITAMIN D3) 125 MCG
1000 CAPSULE ORAL DAILY
Refills: 0 | Status: DISCONTINUED | OUTPATIENT
Start: 2022-07-12 | End: 2022-07-13

## 2022-07-12 RX ORDER — LEVOTHYROXINE SODIUM 125 MCG
50 TABLET ORAL DAILY
Refills: 0 | Status: DISCONTINUED | OUTPATIENT
Start: 2022-07-12 | End: 2022-07-13

## 2022-07-12 RX ORDER — GABAPENTIN 400 MG/1
100 CAPSULE ORAL THREE TIMES A DAY
Refills: 0 | Status: DISCONTINUED | OUTPATIENT
Start: 2022-07-12 | End: 2022-07-13

## 2022-07-12 RX ORDER — MAGNESIUM HYDROXIDE 400 MG/1
30 TABLET, CHEWABLE ORAL EVERY 12 HOURS
Refills: 0 | Status: DISCONTINUED | OUTPATIENT
Start: 2022-07-12 | End: 2022-07-13

## 2022-07-12 RX ORDER — MIRTAZAPINE 45 MG/1
45 TABLET, ORALLY DISINTEGRATING ORAL AT BEDTIME
Refills: 0 | Status: DISCONTINUED | OUTPATIENT
Start: 2022-07-12 | End: 2022-07-13

## 2022-07-12 RX ORDER — ATORVASTATIN CALCIUM 80 MG/1
20 TABLET, FILM COATED ORAL AT BEDTIME
Refills: 0 | Status: DISCONTINUED | OUTPATIENT
Start: 2022-07-12 | End: 2022-07-13

## 2022-07-12 RX ORDER — ONDANSETRON 8 MG/1
4 TABLET, FILM COATED ORAL EVERY 6 HOURS
Refills: 0 | Status: DISCONTINUED | OUTPATIENT
Start: 2022-07-12 | End: 2022-07-13

## 2022-07-12 RX ORDER — POLYETHYLENE GLYCOL 3350 17 G/17G
17 POWDER, FOR SOLUTION ORAL DAILY
Refills: 0 | Status: DISCONTINUED | OUTPATIENT
Start: 2022-07-12 | End: 2022-07-13

## 2022-07-12 RX ORDER — AMLODIPINE BESYLATE 2.5 MG/1
5 TABLET ORAL DAILY
Refills: 0 | Status: DISCONTINUED | OUTPATIENT
Start: 2022-07-12 | End: 2022-07-13

## 2022-07-12 RX ORDER — SODIUM CHLORIDE 9 MG/ML
1000 INJECTION, SOLUTION INTRAVENOUS
Refills: 0 | Status: DISCONTINUED | OUTPATIENT
Start: 2022-07-12 | End: 2022-07-13

## 2022-07-12 RX ORDER — METOPROLOL TARTRATE 50 MG
50 TABLET ORAL AT BEDTIME
Refills: 0 | Status: DISCONTINUED | OUTPATIENT
Start: 2022-07-12 | End: 2022-07-13

## 2022-07-12 RX ORDER — CEFAZOLIN SODIUM 1 G
2000 VIAL (EA) INJECTION EVERY 8 HOURS
Refills: 0 | Status: COMPLETED | OUTPATIENT
Start: 2022-07-12 | End: 2022-07-12

## 2022-07-12 RX ORDER — PANTOPRAZOLE SODIUM 20 MG/1
40 TABLET, DELAYED RELEASE ORAL
Refills: 0 | Status: DISCONTINUED | OUTPATIENT
Start: 2022-07-12 | End: 2022-07-13

## 2022-07-12 RX ORDER — ZOLPIDEM TARTRATE 10 MG/1
5 TABLET ORAL AT BEDTIME
Refills: 0 | Status: DISCONTINUED | OUTPATIENT
Start: 2022-07-12 | End: 2022-07-13

## 2022-07-12 RX ADMIN — Medication 975 MILLIGRAM(S): at 08:06

## 2022-07-12 RX ADMIN — TRAMADOL HYDROCHLORIDE 25 MILLIGRAM(S): 50 TABLET ORAL at 14:01

## 2022-07-12 RX ADMIN — Medication 100 MILLIGRAM(S): at 14:59

## 2022-07-12 RX ADMIN — TRAMADOL HYDROCHLORIDE 25 MILLIGRAM(S): 50 TABLET ORAL at 21:20

## 2022-07-12 RX ADMIN — ZOLPIDEM TARTRATE 5 MILLIGRAM(S): 10 TABLET ORAL at 22:33

## 2022-07-12 RX ADMIN — SODIUM CHLORIDE 75 MILLILITER(S): 9 INJECTION, SOLUTION INTRAVENOUS at 12:00

## 2022-07-12 RX ADMIN — MIRTAZAPINE 45 MILLIGRAM(S): 45 TABLET, ORALLY DISINTEGRATING ORAL at 21:59

## 2022-07-12 RX ADMIN — TRAMADOL HYDROCHLORIDE 25 MILLIGRAM(S): 50 TABLET ORAL at 08:16

## 2022-07-12 RX ADMIN — QUETIAPINE FUMARATE 50 MILLIGRAM(S): 200 TABLET, FILM COATED ORAL at 21:59

## 2022-07-12 RX ADMIN — Medication 975 MILLIGRAM(S): at 15:00

## 2022-07-12 RX ADMIN — Medication 50 MILLIGRAM(S): at 18:55

## 2022-07-12 RX ADMIN — GABAPENTIN 100 MILLIGRAM(S): 400 CAPSULE ORAL at 15:00

## 2022-07-12 RX ADMIN — Medication 1000 UNIT(S): at 15:00

## 2022-07-12 RX ADMIN — Medication 1 TABLET(S): at 14:01

## 2022-07-12 RX ADMIN — SODIUM CHLORIDE 30 MILLILITER(S): 9 INJECTION, SOLUTION INTRAVENOUS at 08:05

## 2022-07-12 RX ADMIN — Medication 975 MILLIGRAM(S): at 16:31

## 2022-07-12 RX ADMIN — TRAMADOL HYDROCHLORIDE 25 MILLIGRAM(S): 50 TABLET ORAL at 08:05

## 2022-07-12 RX ADMIN — ATORVASTATIN CALCIUM 20 MILLIGRAM(S): 80 TABLET, FILM COATED ORAL at 21:21

## 2022-07-12 RX ADMIN — SODIUM CHLORIDE 500 MILLILITER(S): 9 INJECTION, SOLUTION INTRAVENOUS at 14:59

## 2022-07-12 RX ADMIN — SENNA PLUS 2 TABLET(S): 8.6 TABLET ORAL at 21:21

## 2022-07-12 RX ADMIN — GABAPENTIN 100 MILLIGRAM(S): 400 CAPSULE ORAL at 21:21

## 2022-07-12 RX ADMIN — Medication 975 MILLIGRAM(S): at 08:16

## 2022-07-12 RX ADMIN — PANTOPRAZOLE SODIUM 40 MILLIGRAM(S): 20 TABLET, DELAYED RELEASE ORAL at 08:05

## 2022-07-12 RX ADMIN — Medication 100 MILLIGRAM(S): at 23:40

## 2022-07-12 RX ADMIN — POLYETHYLENE GLYCOL 3350 17 GRAM(S): 17 POWDER, FOR SOLUTION ORAL at 14:01

## 2022-07-12 RX ADMIN — Medication 0.5 MILLIGRAM(S): at 21:21

## 2022-07-12 RX ADMIN — Medication 75 MILLIGRAM(S): at 08:11

## 2022-07-12 NOTE — DISCHARGE NOTE NURSING/CASE MANAGEMENT/SOCIAL WORK - NSDPDISTO_GEN_ALL_CORE
Pt. is afebrile and offers no complaints. In no acute distress. Lower back dressing: clean, dry and intact. Pt is ambulating with a walker, tolerating diet well, and voiding in adequate amounts./Home

## 2022-07-12 NOTE — OCCUPATIONAL THERAPY INITIAL EVALUATION ADULT - LIVES WITH, PROFILE
Pt. reports he lives with his wife in an apartment building with no steps to enter through garage. Once inside, pt. reports he has an elevator available to reach apartment located on the 3rd floor; however, prefers to climb the stairs regularly. Per pt., he has a shower stall in his bathroom. Pt. reports he owns a shower chair if needed.

## 2022-07-12 NOTE — PHYSICAL THERAPY INITIAL EVALUATION ADULT - ADDITIONAL COMMENTS
Pt lives in a third-floor apartment with his wife; there is an elevator but pt reports he regularly uses stairs. Pt reports he was independent with mobility, self-care, and ADLs; no assistive devices utilized.

## 2022-07-12 NOTE — DISCHARGE NOTE NURSING/CASE MANAGEMENT/SOCIAL WORK - PATIENT PORTAL LINK FT
You can access the FollowMyHealth Patient Portal offered by [facility] by registering at the following website: [email or web address]. By joining [employer]’s FollowMyHealth portal, you will also be able to view your health information using other applications (apps) compatible with our system.

## 2022-07-12 NOTE — DISCHARGE NOTE NURSING/CASE MANAGEMENT/SOCIAL WORK - NSDCPECAREGIVERED_GEN_ALL_CORE
carenotes on laminectomy, spine d/c brochure, managing pain handout, d/c medications Medline and carenotes for surgical procedure Laminectomy, Spine precautions, Incision care, pain management, Oxy IR, russell, Tramadol, as well as DC Medications and side effects literature for patient reference.

## 2022-07-12 NOTE — OCCUPATIONAL THERAPY INITIAL EVALUATION ADULT - NS ASR BATHING EQUIP NEEDS
Pt. educated on benefits and how to privately purchase if needed. Pt reports he owns a shower chair./[employer]

## 2022-07-12 NOTE — DISCHARGE NOTE NURSING/CASE MANAGEMENT/SOCIAL WORK - NSTOBACCONEVERSMOKERY/N_GEN_A
Triage Note: seen at [place] [address] last Monday for fever and fussiness and dx with an ear infection and started on cefdinir, pt began vomiting on Wednesday, talked with pediatrician who said to do smaller more frequent feeds and add in pedialyte, vomiting worse Friday and ended up being seen at [place] [address] and started on zofran twice a day last at about 10pm 1mL, also instructed to stop the ABX and said that the ear infection has cleared up, fever started again Saturday night and mother concerned due to fever up to 102.1 tonight, mother gave a bottle with 3.5mL of tylenol mixed in with it at 0130 but pt spit some of it up, also with a \"small' cough that started Friday Yes

## 2022-07-12 NOTE — PHYSICAL THERAPY INITIAL EVALUATION ADULT - DISCHARGE DISPOSITION, PT EVAL
Recommend discharge home; patient to follow-up for outpatient physical therapy script at surgeon's office upon follow-up visit.

## 2022-07-12 NOTE — OCCUPATIONAL THERAPY INITIAL EVALUATION ADULT - MD ORDER
Occupational Therapy (OT) to evaluate and treat. Out of Bed to Chair. Per RN, pt is okay to participate in OT evaluation and perform activity as tolerated.

## 2022-07-12 NOTE — OCCUPATIONAL THERAPY INITIAL EVALUATION ADULT - ANTICIPATED DISCHARGE DISPOSITION, OT EVAL
Anticipate Home with no skilled OT services. Pt. may benefit from PT services. Pt reports his wife is available to assist as needed. Anticipate Home with no skilled OT services. Pt. may benefit from PT services.

## 2022-07-12 NOTE — PHYSICAL THERAPY INITIAL EVALUATION ADULT - GENERAL OBSERVATIONS, REHAB EVAL
Upon entry, pt semi-supine in bed in NAD; + IV, + ЮЛИЯ drain. Pt left seated in bedside chair with all tubes/lines intact, TLSO donned, call bell in reach and in NAD.

## 2022-07-12 NOTE — PATIENT PROFILE ADULT - FALL HARM RISK - HARM RISK INTERVENTIONS
Assistance with ambulation/Assistance OOB with selected safe patient handling equipment/Communicate Risk of Fall with Harm to all staff/Monitor gait and stability/Reinforce activity limits and safety measures with patient and family/Sit up slowly, dangle for a short time, stand at bedside before walking/Tailored Fall Risk Interventions/Use of alarms - bed, chair and/or voice tab/Visual Cue: Yellow wristband and red socks/Bed in lowest position, wheels locked, appropriate side rails in place/Call bell, personal items and telephone in reach/Instruct patient to call for assistance before getting out of bed or chair/Non-slip footwear when patient is out of bed/[place] to call system/Physically safe environment - no spills, clutter or unnecessary equipment/Purposeful Proactive Rounding/Room/bathroom lighting operational, light cord in reach Assistance with ambulation/Assistance OOB with selected safe patient handling equipment/Communicate Risk of Fall with Harm to all staff/Discuss with provider need for PT consult/Monitor gait and stability/Provide patient with walking aids - walker, cane, crutches/Reinforce activity limits and safety measures with patient and family/Sit up slowly, dangle for a short time, stand at bedside before walking/Tailored Fall Risk Interventions/Use of alarms - bed, chair and/or voice tab/Visual Cue: Yellow wristband and red socks/Bed in lowest position, wheels locked, appropriate side rails in place/Call bell, personal items and telephone in reach/Instruct patient to call for assistance before getting out of bed or chair/Non-slip footwear when patient is out of bed/[place] to call system/Physically safe environment - no spills, clutter or unnecessary equipment/Purposeful Proactive Rounding/Room/bathroom lighting operational, light cord in reach

## 2022-07-12 NOTE — PROGRESS NOTE ADULT - SUBJECTIVE AND OBJECTIVE BOX
ORTHO POST OP NOTE    Pt resting comfortably without complaint. No chest pain/no dizziness/no SOB.       Vital Signs Last 24 Hrs  T(C): 36.6 (12 Jul 2022 12:15), Max: 36.8 (12 Jul 2022 07:16)  T(F): 97.8 (12 Jul 2022 12:15), Max: 98.2 (12 Jul 2022 07:16)  HR: 70 (12 Jul 2022 13:30) (68 - 90)  BP: 132/64 (12 Jul 2022 13:30) (104/61 - 141/70)  BP(mean): 84 (12 Jul 2022 12:30) (75 - 89)  RR: 18 (12 Jul 2022 13:30) (12 - 18)  SpO2: 98% (12 Jul 2022 13:30) (95% - 99%)    Parameters below as of 12 Jul 2022 13:30  Patient On (Oxygen Delivery Method): room air                            12.2   5.48  )-----------( 172      ( 12 Jul 2022 11:39 )             35.7     07-12    141  |  105  |  23  ----------------------------<  121<H>  4.0   |  25  |  0.91    Ca    8.8      12 Jul 2022 11:39        Spine: Dressing/ Incision Clean/Dry/Intact  HV in place  Bilat LE exam:  EHL/FHL/GC/TA 5/5                     Sensation grossly intact to light touch distal                     DP pulse 2+      A/P: 76y Male s/p L4-5 lami, cyst excision    - WBAT  -DVT ppx- venodynes + ambulation  - Pain control  -Incentive spirometer  -FU AM labs  -PT/OT  -Monitor HV output  -Continue to monitor. Notify ortho with any questions.                                                                                          ORTHO POST OP NOTE    Pt resting comfortably without complaint. No chest pain/no dizziness/no SOB.       Vital Signs Last 24 Hrs  T(C): 36.6 (12 Jul 2022 12:15), Max: 36.8 (12 Jul 2022 07:16)  T(F): 97.8 (12 Jul 2022 12:15), Max: 98.2 (12 Jul 2022 07:16)  HR: 70 (12 Jul 2022 13:30) (68 - 90)  BP: 132/64 (12 Jul 2022 13:30) (104/61 - 141/70)  BP(mean): 84 (12 Jul 2022 12:30) (75 - 89)  RR: 18 (12 Jul 2022 13:30) (12 - 18)  SpO2: 98% (12 Jul 2022 13:30) (95% - 99%)    Parameters below as of 12 Jul 2022 13:30  Patient On (Oxygen Delivery Method): room air                            12.2   5.48  )-----------( 172      ( 12 Jul 2022 11:39 )             35.7     07-12    141  |  105  |  23  ----------------------------<  121<H>  4.0   |  25  |  0.91    Ca    8.8      12 Jul 2022 11:39        Spine: Dressing/ Incision Clean/Dry/Intact  HV in place  Bilat LE exam:  EHL/FHL/GC/TA 5/5                     Sensation grossly intact to light touch distal                     DP pulse 2+      A/P: 76y Male s/p L4-5 lami, cyst excision    - WBAT  -DVT ppx- venodynes + ambulation  - Pain control  -Incentive spirometer  -FU AM labs  -PT/OT  -Monitor HV output  -Continue to monitor. Notify ortho with any questions.     Patient seen post-operatively doing well. Resolved pre-operative leg pain. Incisional pain controlled. I discussed and agree with the above, Dr. Perfecto Rodriguez.

## 2022-07-12 NOTE — OCCUPATIONAL THERAPY INITIAL EVALUATION ADULT - PERTINENT HX OF CURRENT PROBLEM, REHAB EVAL
Pt is a 76 year old male with dx of radiculopathy lumbar region. Pt is now s/p L4-5 laminectomy, cyst excision on 7/12/22.

## 2022-07-12 NOTE — DISCHARGE NOTE NURSING/CASE MANAGEMENT/SOCIAL WORK - NSDCPNINST_GEN_ALL_CORE
You have a postop appointment with Dr Rodriguez on July 25th 2022 @ 2:00pm.  Notify Dr Rodriguez if you experience any increase in pain not relieved with medication, any redness, drainage or swelling around incision, any fever >100.5.  Drink plenty of fluids.  No heavy lifting or straining, pushing or pulling. Maintain proper body alignment, no bending or twisting at the waist.  Use over the counter stool softeners to assist with constipation.

## 2022-07-12 NOTE — DISCHARGE NOTE NURSING/CASE MANAGEMENT/SOCIAL WORK - NSDCPEFALRISK_GEN_ALL_CORE
For information on Fall & Injury Prevention, visit: [email or web address] OR  [email or web address] OR  https://www.cdc.gov/steadi/patient.html

## 2022-07-12 NOTE — OCCUPATIONAL THERAPY INITIAL EVALUATION ADULT - GENERAL OBSERVATIONS, REHAB EVAL
Pt. received sitting in chair next to bed, +TLSO brace. No acute distress. Patient agreed to evaluation from Occupational Therapist. +Clean dry intact dressing to Back, +IV, +Accordion Drain. Wife bedside.

## 2022-07-13 ENCOUNTER — TRANSCRIPTION ENCOUNTER (OUTPATIENT)
Age: 77
End: 2022-07-13

## 2022-07-13 VITALS
TEMPERATURE: 98 F | SYSTOLIC BLOOD PRESSURE: 114 MMHG | RESPIRATION RATE: 17 BRPM | DIASTOLIC BLOOD PRESSURE: 62 MMHG | OXYGEN SATURATION: 97 % | HEART RATE: 69 BPM

## 2022-07-13 LAB
ANION GAP SERPL CALC-SCNC: 9 MMOL/L — SIGNIFICANT CHANGE UP (ref 7–14)
BASOPHILS # BLD AUTO: 0.02 K/UL — SIGNIFICANT CHANGE UP (ref 0–0.2)
BASOPHILS NFR BLD AUTO: 0.2 % — SIGNIFICANT CHANGE UP (ref 0–2)
BUN SERPL-MCNC: 18 MG/DL — SIGNIFICANT CHANGE UP (ref 7–23)
CALCIUM SERPL-MCNC: 8.6 MG/DL — SIGNIFICANT CHANGE UP (ref 8.4–10.5)
CHLORIDE SERPL-SCNC: 108 MMOL/L — HIGH (ref 98–107)
CO2 SERPL-SCNC: 25 MMOL/L — SIGNIFICANT CHANGE UP (ref 22–31)
CREAT SERPL-MCNC: 0.83 MG/DL — SIGNIFICANT CHANGE UP (ref 0.5–1.3)
EGFR: 90 ML/MIN/1.73M2 — SIGNIFICANT CHANGE UP
EOSINOPHIL # BLD AUTO: 0.03 K/UL — SIGNIFICANT CHANGE UP (ref 0–0.5)
EOSINOPHIL NFR BLD AUTO: 0.3 % — SIGNIFICANT CHANGE UP (ref 0–6)
GLUCOSE SERPL-MCNC: 95 MG/DL — SIGNIFICANT CHANGE UP (ref 70–99)
HCT VFR BLD CALC: 31.3 % — LOW (ref 39–50)
HGB BLD-MCNC: 11.1 G/DL — LOW (ref 13–17)
IANC: 5.84 K/UL — SIGNIFICANT CHANGE UP (ref 1.8–7.4)
IMM GRANULOCYTES NFR BLD AUTO: 0.1 % — SIGNIFICANT CHANGE UP (ref 0–1.5)
LYMPHOCYTES # BLD AUTO: 1.92 K/UL — SIGNIFICANT CHANGE UP (ref 1–3.3)
LYMPHOCYTES # BLD AUTO: 21.8 % — SIGNIFICANT CHANGE UP (ref 13–44)
MCHC RBC-ENTMCNC: 33 PG — SIGNIFICANT CHANGE UP (ref 27–34)
MCHC RBC-ENTMCNC: 35.5 GM/DL — SIGNIFICANT CHANGE UP (ref 32–36)
MCV RBC AUTO: 93.2 FL — SIGNIFICANT CHANGE UP (ref 80–100)
MONOCYTES # BLD AUTO: 0.99 K/UL — HIGH (ref 0–0.9)
MONOCYTES NFR BLD AUTO: 11.2 % — SIGNIFICANT CHANGE UP (ref 2–14)
NEUTROPHILS # BLD AUTO: 5.84 K/UL — SIGNIFICANT CHANGE UP (ref 1.8–7.4)
NEUTROPHILS NFR BLD AUTO: 66.4 % — SIGNIFICANT CHANGE UP (ref 43–77)
NRBC # BLD: 0 /100 WBCS — SIGNIFICANT CHANGE UP
NRBC # FLD: 0 K/UL — SIGNIFICANT CHANGE UP
PLATELET # BLD AUTO: 176 K/UL — SIGNIFICANT CHANGE UP (ref 150–400)
POTASSIUM SERPL-MCNC: 4.2 MMOL/L — SIGNIFICANT CHANGE UP (ref 3.5–5.3)
POTASSIUM SERPL-SCNC: 4.2 MMOL/L — SIGNIFICANT CHANGE UP (ref 3.5–5.3)
RBC # BLD: 3.36 M/UL — LOW (ref 4.2–5.8)
RBC # FLD: 15.1 % — HIGH (ref 10.3–14.5)
SODIUM SERPL-SCNC: 142 MMOL/L — SIGNIFICANT CHANGE UP (ref 135–145)
WBC # BLD: 8.81 K/UL — SIGNIFICANT CHANGE UP (ref 3.8–10.5)
WBC # FLD AUTO: 8.81 K/UL — SIGNIFICANT CHANGE UP (ref 3.8–10.5)

## 2022-07-13 PROCEDURE — 99223 1ST HOSP IP/OBS HIGH 75: CPT

## 2022-07-13 RX ORDER — QUETIAPINE FUMARATE 200 MG/1
75 TABLET, FILM COATED ORAL DAILY
Refills: 0 | Status: DISCONTINUED | OUTPATIENT
Start: 2022-07-13 | End: 2022-07-13

## 2022-07-13 RX ORDER — TRAMADOL HYDROCHLORIDE 50 MG/1
0.5 TABLET ORAL
Qty: 0 | Refills: 0 | DISCHARGE
Start: 2022-07-13

## 2022-07-13 RX ORDER — PANTOPRAZOLE SODIUM 20 MG/1
1 TABLET, DELAYED RELEASE ORAL
Qty: 30 | Refills: 0
Start: 2022-07-13 | End: 2022-08-11

## 2022-07-13 RX ORDER — QUETIAPINE FUMARATE 200 MG/1
50 TABLET, FILM COATED ORAL AT BEDTIME
Refills: 0 | Status: DISCONTINUED | OUTPATIENT
Start: 2022-07-13 | End: 2022-07-13

## 2022-07-13 RX ORDER — CYCLOBENZAPRINE HYDROCHLORIDE 10 MG/1
1 TABLET, FILM COATED ORAL
Qty: 21 | Refills: 0
Start: 2022-07-13 | End: 2022-07-19

## 2022-07-13 RX ORDER — SENNA PLUS 8.6 MG/1
2 TABLET ORAL
Qty: 14 | Refills: 0
Start: 2022-07-13 | End: 2022-07-19

## 2022-07-13 RX ORDER — GABAPENTIN 400 MG/1
1 CAPSULE ORAL
Qty: 42 | Refills: 0
Start: 2022-07-13 | End: 2022-07-26

## 2022-07-13 RX ORDER — OXYCODONE HYDROCHLORIDE 5 MG/1
1 TABLET ORAL
Qty: 42 | Refills: 0
Start: 2022-07-13 | End: 2022-07-19

## 2022-07-13 RX ADMIN — TRAMADOL HYDROCHLORIDE 25 MILLIGRAM(S): 50 TABLET ORAL at 05:49

## 2022-07-13 RX ADMIN — SODIUM CHLORIDE 500 MILLILITER(S): 9 INJECTION, SOLUTION INTRAVENOUS at 05:49

## 2022-07-13 RX ADMIN — SERTRALINE 75 MILLIGRAM(S): 25 TABLET, FILM COATED ORAL at 12:38

## 2022-07-13 RX ADMIN — POLYETHYLENE GLYCOL 3350 17 GRAM(S): 17 POWDER, FOR SOLUTION ORAL at 12:38

## 2022-07-13 RX ADMIN — PANTOPRAZOLE SODIUM 40 MILLIGRAM(S): 20 TABLET, DELAYED RELEASE ORAL at 06:55

## 2022-07-13 RX ADMIN — GABAPENTIN 100 MILLIGRAM(S): 400 CAPSULE ORAL at 05:49

## 2022-07-13 RX ADMIN — Medication 1000 UNIT(S): at 12:39

## 2022-07-13 RX ADMIN — Medication 50 MICROGRAM(S): at 05:49

## 2022-07-13 RX ADMIN — Medication 1 TABLET(S): at 12:39

## 2022-07-13 NOTE — DISCHARGE NOTE PROVIDER - NSDCFUSCHEDAPPT_GEN_ALL_CORE_FT
Perfecto Rodriguez  [facility]  ORTHOSURG [address] [facility]  Scheduled Appointment: 07/25/2022

## 2022-07-13 NOTE — PROGRESS NOTE ADULT - SUBJECTIVE AND OBJECTIVE BOX
Orthopaedic Surgery Progress Note    Subjective:   Patient seen and examined. No acute events overnight. Confused, doesn't remember getting out of bed today. Having trouble tolerating the collar.     Objective:  T(C): 36.7 (07-13-22 @ 05:45), Max: 36.8 (07-12-22 @ 07:16)  HR: 63 (07-13-22 @ 05:45) (63 - 90)  BP: 121/68 (07-13-22 @ 05:45) (103/50 - 141/70)  RR: 17 (07-13-22 @ 05:45) (12 - 18)  SpO2: 96% (07-13-22 @ 05:45) (93% - 99%)  Wt(kg): --    07-12 @ 07:01  -  07-13 @ 06:37  --------------------------------------------------------  IN: 75 mL / OUT: 1412 mL / NET: -1337 mL        PE  Gen: NAD  Back:   dressing C/D/I, mild appropriate salma-incisional ttp  HMV in place w/ serosanguinous output  Neuro:  RLE IP 5/5 HS 5/5 Q 5/5 GS 5/5 TA 5/5 EHL 5/5   SILT L2-S1  LLE IP 5/5 HS 5/5 Q 5/5 GS 5/5 TA 5/5 EHL 5/5  SILT L2-S1  WWP BLE                          12.2   5.48  )-----------( 172      ( 12 Jul 2022 11:39 )             35.7     07-12    141  |  105  |  23  ----------------------------<  121<H>  4.0   |  25  |  0.91    Ca    8.8      12 Jul 2022 11:39            76y Male s/p L4-5 laminectomy, cyst excision  - Pain control  - FU labs  - WBAT  - PT/OT/OOB  - I/S  - Monitor HMV output  - SCDs  - Dispo planning: home when drain out      Emily Thakur PGY-3  Orthopaedic Surgery  [facility] [id number]  [facility] [id number]  [facility] [id number]  Orthopaedic Surgery Progress Note    Subjective:   Patient seen and examined. No acute events overnight. Ambulating in halls, did stairs w/ PT.     Objective:  T(C): 36.7 (07-13-22 @ 05:45), Max: 36.8 (07-12-22 @ 07:16)  HR: 63 (07-13-22 @ 05:45) (63 - 90)  BP: 121/68 (07-13-22 @ 05:45) (103/50 - 141/70)  RR: 17 (07-13-22 @ 05:45) (12 - 18)  SpO2: 96% (07-13-22 @ 05:45) (93% - 99%)  Wt(kg): --    07-12 @ 07:01  -  07-13 @ 06:37  --------------------------------------------------------  IN: 75 mL / OUT: 1412 mL / NET: -1337 mL        PE  Gen: NAD  Back:   dressing C/D/I, mild appropriate salma-incisional ttp  HMV in place w/ serosanguinous output  Neuro:  RLE IP 5/5 HS 5/5 Q 5/5 GS 5/5 TA 5/5 EHL 5/5   SILT L2-S1  LLE IP 5/5 HS 5/5 Q 5/5 GS 5/5 TA 5/5 EHL 5/5  SILT L2-S1  WWP BLE                          12.2   5.48  )-----------( 172      ( 12 Jul 2022 11:39 )             35.7     07-12    141  |  105  |  23  ----------------------------<  121<H>  4.0   |  25  |  0.91    Ca    8.8      12 Jul 2022 11:39            76y Male s/p L4-5 laminectomy, cyst excision  - Pain control  - FU labs  - WBAT  - PT/OT/OOB  - I/S  - Monitor HMV output  - SCDs  - Dispo planning: home when drain out      Emily Thakur PGY-3  Orthopaedic Surgery  [facility] [id number]  [facility] [id number]  [facility] [id number]  Orthopaedic Surgery Progress Note    Subjective:   Patient seen and examined. No acute events overnight. Ambulating in halls, did stairs w/ PT.     Objective:  T(C): 36.7 (07-13-22 @ 05:45), Max: 36.8 (07-12-22 @ 07:16)  HR: 63 (07-13-22 @ 05:45) (63 - 90)  BP: 121/68 (07-13-22 @ 05:45) (103/50 - 141/70)  RR: 17 (07-13-22 @ 05:45) (12 - 18)  SpO2: 96% (07-13-22 @ 05:45) (93% - 99%)  Wt(kg): --    07-12 @ 07:01  -  07-13 @ 06:37  --------------------------------------------------------  IN: 75 mL / OUT: 1412 mL / NET: -1337 mL        PE  Gen: NAD  Back:   dressing C/D/I, mild appropriate salma-incisional ttp  HMV in place w/ serosanguinous output  Neuro:  RLE IP 5/5 HS 5/5 Q 5/5 GS 5/5 TA 5/5 EHL 5/5   SILT L2-S1  LLE IP 5/5 HS 5/5 Q 5/5 GS 5/5 TA 5/5 EHL 5/5  SILT L2-S1  WWP BLE                          12.2   5.48  )-----------( 172      ( 12 Jul 2022 11:39 )             35.7     07-12    141  |  105  |  23  ----------------------------<  121<H>  4.0   |  25  |  0.91    Ca    8.8      12 Jul 2022 11:39            76y Male s/p L4-5 laminectomy, cyst excision  - Pain control  - FU labs  - WBAT  - PT/OT/OOB  - I/S  - Monitor HMV output  - SCDs  - Dispo planning: home when drain out      Emily Thakur PGY-3  Orthopaedic Surgery  [facility] [id number]  [facility] [id number]  [facility] [id number]     I discussed and agree with the above, Dr. Perfecto Rodriguez.

## 2022-07-13 NOTE — DISCHARGE NOTE PROVIDER - HOSPITAL COURSE
75 yo s/p L4-5 lami/ L3-4 ISF 7/12/22  with Dr Rodriguez. Patient tolerated the procedure well without any complications.  Patient tolerated physical therapy well and pain is controlled.   A medical co-management attending has followed patient  for continuity of care and management and cleared for safe discharge.  Please follow up with Dr Rodriguez in 1-2 weeks.  Call office to make an appointment [phone].  Please follow up with your primary care physician as medications may have changed.  Please avoid any NSAIDS, aspirin or anti-inflammatory medications unless otherwise specified by your surgeon.  Avoid any heavy lifting, bending, squatting, twisting motion,  Keep dressing and/or incision clean and dry.  Remove dressing in 2 days.   Patient may shower, please avoid aiming shower stream directly onto incision.  Sutures/staples to be removed at office postop visit POD 14.  Patient is weight bear as tolerated.  Please notify Ortho with any questions.     ISTOP 75 yo s/p L4-5 lami/ cyst excision 7/12/22  with Dr Rodriguez. Patient tolerated the procedure well without any complications.  Patient tolerated physical therapy well and pain is controlled.   A medical co-management attending has followed patient  for continuity of care and management and cleared for safe discharge.  Please follow up with Dr Rodriguez in 1-2 weeks.  Call office to make an appointment [phone].  Please follow up with your primary care physician as medications may have changed.  Please avoid any NSAIDS, aspirin or anti-inflammatory medications unless otherwise specified by your surgeon.  Avoid any heavy lifting, bending, squatting, twisting motion,  Keep dressing and/or incision clean and dry.  Remove dressing in 2 days.   Patient may shower, please avoid aiming shower stream directly onto incision.  Sutures/staples to be removed at office postop visit POD 14.  Patient is weight bear as tolerated.  Please notify Ortho with any questions.     ISTOP 77 y/o [facility]. Patient s/p L4-5 laminectomy/cyst excision with Dr. Rodriguez on 7/12/2022. Patient tolerated the procedure well without any intraoperative complications. Patient tolerated physical therapy, weight bearing as tolerated and pain is controlled. Seen by medical attending for continuity of care and management and cleared for safe discharge. As per surgeon patient stable and ready for discharge. Do not take any NSAIDS (motrin, advil, ibuprofren, aleve), Aspirin, Anti-inflammatory medications unless instructed by your orthopaedic surgeon to continue. Avoid any heavy lifting, bending, squatting, or twisting motions. Keep dressing/incision clean, dry and intact, may remove dressing two days after discharge and leave incision open to air. Any sutures/staples to be removed on post-op day #14 at office visit. Please follow up with Dr. Rodriguez in 2 weeks, call office to make appointment, [phone]. Please follow up with your PMD for continuity of care and management as medications may have changed.

## 2022-07-13 NOTE — DISCHARGE NOTE PROVIDER - NSDCCPTREATMENT_GEN_ALL_CORE_FT
PRINCIPAL PROCEDURE  Procedure: Laminectomy, lumbar, 1 or 2 levels  Findings and Treatment:        PRINCIPAL PROCEDURE  Procedure: Laminectomy, lumbar, 1 or 2 levels  Findings and Treatment: 75 y/o Male presents to [facility] for orthopedic surgery. Patient s/p L4-5 laminectomy/cyst excision with Dr. Rodriguez on 7/12/2022. Patient tolerated the procedure well without any intraoperative complications. Patient tolerated physical therapy, weight bearing as tolerated and pain is controlled. Seen by medical attending for continuity of care and management and cleared for safe discharge. As per surgeon patient stable and ready for discharge. Do not take any NSAIDS (motrin, advil, ibuprofren, aleve), Aspirin, Anti-inflammatory medications unless instructed by your orthopaedic surgeon to continue. Avoid any heavy lifting, bending, squatting, or twisting motions. Keep dressing/incision clean, dry and intact, may remove dressing two days after discharge and leave incision open to air. Any sutures/staples to be removed on post-op day #14 at office visit. Please follow up with Dr. Rodriguez in 2 weeks, call office to make appointment, [phone]. Please follow up with your PMD for continuity of care and management as medications may have changed.

## 2022-07-13 NOTE — CONSULT NOTE ADULT - ASSESSMENT
76 year old male w/ Hx HTN, hyperlipidemia, anxiety, hypothyroidism p/w 1 year of worsening lower back pain radiating to left leg now s/p L4- L5 lumbar laminectomy on 7/12/22.

## 2022-07-13 NOTE — DISCHARGE NOTE PROVIDER - NSDCCPCAREPLAN_GEN_ALL_CORE_FT
PRINCIPAL DISCHARGE DIAGNOSIS  Diagnosis: Radiculopathy, lumbar region  Assessment and Plan of Treatment:

## 2022-07-13 NOTE — DISCHARGE NOTE PROVIDER - CARE PROVIDER_API CALL
Perfecto Rodriguez (MD; DC)  Orthopaedic Surgery  [address], [address]  [place] [address]  Phone: [phone]  Fax: [phone]  Established Patient  Follow Up Time:    Perfecto Rodriguez (MD; DC)  Orthopaedic Surgery  [address], [address]  [place] [address]  Phone: [phone]  Fax: [phone]  Established Patient  Follow Up Time: 2 weeks

## 2022-07-13 NOTE — CONSULT NOTE ADULT - SUBJECTIVE AND OBJECTIVE BOX
Patient is a 76y old  Male who presents with a chief complaint of LL4-5 lami/ cyst excision7/12/22 (13 Jul 2022 06:34)      HPI:  76 year old male w/ Hx HTN, hyperlipidemia, anxiety, hypothyroidism with pre  op dx of radiculopathy lumbar region is scheduled for L4- L5 lumbar laminectomy.       History limited due to: [ ] Dementia  [ ] Delirium  [ ] Condition    Pain Symptoms if applicable:             	                         none	   mild         moderate         severe  Pain:	                            0	    1-3	     4-6	         7-10  Location:	  Modifying factors:	  Associated symptoms:	    Function: [ ] Independent  [ ] Assistance  [ ] Total care  [ ] Non-ambulatory    Allergies    No Known Allergies    Intolerances        HOME MEDICATIONS: [x ] Reviewed  Ambien 5 mg oral tablet: Last Dose Taken:  , 1 tab(s) orally once a day (at bedtime)  Vitamin D3 1000 intl units (25 mcg) oral tablet: Last Dose Taken:  , 1  orally once a day  Centrum Silver Men's oral tablet: Last Dose Taken:  , 1 tab(s) orally once a day, last dose 07/05/22  irbesartan 75 mg oral tablet: Last Dose Taken:  , 1 tab(s) orally once a day, pm  KlonoPIN 0.5 mg oral tablet: Last Dose Taken:  , orally once a day, pm  metoprolol succinate 50 mg oral tablet, extended release: Last Dose Taken:  , 1 tab(s) orally once a day, pm  Norvasc 5 mg oral tablet: Last Dose Taken:  , 1 tab(s) orally once a day, pm  biotin 5 mg oral capsule: Last Dose Taken:  , 1 cap(s) orally once a day, last dose 07/05/22  QUEtiapine 50 mg oral tablet: Last Dose Taken:  , 1 tab(s) orally once a day (at bedtime)  mirtazapine 45 mg oral tablet: Last Dose Taken:  , 1 tab(s) orally once a day (at bedtime)  Synthroid 50 mcg (0.05 mg) oral tablet: Last Dose Taken:  , 1 tab(s) orally once a day  sertraline 25 mg oral tablet: Last Dose Taken:  , 1 tab(s) orally once a day, am  sertraline 50 mg oral tablet: Last Dose Taken:  , 1 tab(s) orally once a day, am  rosuvastatin 5 mg oral tablet: 1 tab(s) orally once a day (at bedtime)    MEDICATIONS  (STANDING):  acetaminophen     Tablet .. 975 milliGRAM(s) Oral every 8 hours  amLODIPine   Tablet 5 milliGRAM(s) Oral daily  atorvastatin 20 milliGRAM(s) Oral at bedtime  cholecalciferol 1000 Unit(s) Oral daily  clonazePAM  Tablet 0.5 milliGRAM(s) Oral at bedtime  gabapentin 100 milliGRAM(s) Oral three times a day  lactated ringers. 1000 milliLiter(s) (75 mL/Hr) IV Continuous <Continuous>  levothyroxine 50 MICROGram(s) Oral daily  losartan 25 milliGRAM(s) Oral daily  metoprolol succinate ER 50 milliGRAM(s) Oral at bedtime  mirtazapine 45 milliGRAM(s) Oral at bedtime  multivitamin 1 Tablet(s) Oral daily  pantoprazole    Tablet 40 milliGRAM(s) Oral before breakfast  polyethylene glycol 3350 17 Gram(s) Oral daily  QUEtiapine 75 milliGRAM(s) Oral daily  senna 2 Tablet(s) Oral at bedtime  sertraline 75 milliGRAM(s) Oral daily  traMADol 25 milliGRAM(s) Oral every 8 hours    MEDICATIONS  (PRN):  aluminum hydroxide/magnesium hydroxide/simethicone Suspension 30 milliLiter(s) Oral every 12 hours PRN Indigestion  bisacodyl 5 milliGRAM(s) Oral every 12 hours PRN Constipation  cyclobenzaprine 5 milliGRAM(s) Oral every 8 hours PRN Muscle Spasm  magnesium hydroxide Suspension 30 milliLiter(s) Oral every 12 hours PRN Constipation  ondansetron Injectable 4 milliGRAM(s) IV Push every 6 hours PRN Nausea and/or Vomiting  oxyCODONE    IR 2.5 milliGRAM(s) Oral every 4 hours PRN Moderate Pain (4 - 6)  oxyCODONE    IR 5 milliGRAM(s) Oral every 4 hours PRN Severe Pain (7 - 10)  zolpidem 5 milliGRAM(s) Oral at bedtime PRN Insomnia      PAST MEDICAL & SURGICAL HISTORY:  HTN (hypertension)      HLD (hyperlipidemia)      Situational anxiety      Former smoker      Renal cancer, left      Spondylolisthesis, lumbar region      Renal cyst      Radiculopathy, lumbar region      H/O needle biopsy  of prostate      S/P wrist surgery  left wrist, 30 years ago with hardware placement and excision of hardware months later      H/O melanoma excision  right shoulder in 2015      H/O left nephrectomy  Partial      [x ] Reviewed     SOCIAL HISTORY:  Residence: [ ] [facility]  [ ] SNF  [x ] Community  [x ] Substance abuse: none  [ x] Tobacco: 2 PPD x 7 years. Quit 50 years ago  [ x] Alcohol use: 1-2 drinks of liquor 3x/week    FAMILY HISTORY:  Family history of heart attack (Father)  father age 52  brother age 57    Family history of renal failure (Mother)        REVIEW OF SYSTEMS:    CONSTITUTIONAL: No fever, weight loss, or fatigue  EYES: No eye pain, visual disturbances, or discharge  ENMT:  No difficulty hearing, tinnitus, vertigo; No sinus or throat pain  NECK: No pain or stiffness  BREASTS: No pain, masses, or nipple discharge  RESPIRATORY: No cough, wheezing, chills or hemoptysis; No shortness of breath  CARDIOVASCULAR: No chest pain, palpitations, dizziness, or leg swelling  GASTROINTESTINAL: No abdominal or epigastric pain. No nausea, vomiting, or hematemesis; No diarrhea or constipation. No melena or hematochezia.  GENITOURINARY: No dysuria, frequency, hematuria, or incontinence  NEUROLOGICAL: No headaches, memory loss, loss of strength, numbness, or tremors  SKIN: No itching, burning, rashes, or lesions   LYMPH NODES: No enlarged glands  ENDOCRINE: No heat or cold intolerance; No hair loss  MUSCULOSKELETAL: No muscle or back pain  PSYCHIATRIC: No depression, anxiety, mood swings, or difficulty sleeping  HEME/LYMPH: No easy bruising, or bleeding gums  ALLERGY AND IMMUNOLOGIC: No hives or eczema    [  x] All other ROS negative  [  ] Unable to obtain due to poor mental status    Vital Signs Last 24 Hrs  T(C): 36.4 (13 Jul 2022 09:25), Max: 36.7 (12 Jul 2022 10:50)  T(F): 97.5 (13 Jul 2022 09:25), Max: 98.1 (12 Jul 2022 10:50)  HR: 65 (13 Jul 2022 09:25) (63 - 90)  BP: 102/54 (13 Jul 2022 09:25) (102/54 - 141/70)  BP(mean): 84 (12 Jul 2022 12:30) (75 - 89)  RR: 18 (13 Jul 2022 09:25) (12 - 18)  SpO2: 98% (13 Jul 2022 09:25) (93% - 99%)    Parameters below as of 13 Jul 2022 09:25  Patient On (Oxygen Delivery Method): room air        PHYSICAL EXAM:    GENERAL: NAD, well-groomed, well-developed  HEAD:  Atraumatic, Normocephalic  EYES: EOMI, PERRLA, conjunctiva and sclera clear  ENMT: Moist mucous membranes  NECK: Supple, No JVD  RESPIRATORY: Clear to auscultation bilaterally; No rales, rhonchi, wheezing, or rubs  CARDIOVASCULAR: Regular rate and rhythm; No murmurs, rubs, or gallops  GASTROINTESTINAL: Soft, Nontender, Nondistended; Bowel sounds present  GENITOURINARY: Not examined  EXTREMITIES:  2+ Peripheral Pulses, No clubbing, cyanosis, or edema  NERVOUS SYSTEM:  Alert & Oriented X3; Moving all 4 extremities; No gross sensory deficits  HEME/LYMPH: No lymphadenopathy noted  SKIN: No rashes or lesions; Incisions C/D/I    LABS:                        11.1   8.81  )-----------( 176      ( 13 Jul 2022 05:55 )             31.3     07-13    142  |  108<H>  |  18  ----------------------------<  95  4.2   |  25  |  0.83    Ca    8.6      13 Jul 2022 05:55          CAPILLARY BLOOD GLUCOSE          RADIOLOGY & ADDITIONAL STUDIES:    EKG:   Personally Reviewed:  [ ] YES     Imaging: < from: Xray Lumbar Spine AP + Lateral (07.12.22 @ 09:21) >  IMPRESSION:  Distal tip of a surgical clamp projects over L5 spinous process.    Tangled radiodense ringlike material projecting above the clamp tip   related to crumpled gauze in the the operative field.    Correlate with preoperative workup and intraoperative findings.    < end of copied text >    Personally Reviewed:  [ x] YES               Consultant(s) notes reviewed:    Care Discussed with Consultant(s)/Other Providers: Ortho     Patient is a 76y old  Male who presents with a chief complaint of LL4-5 lami/ cyst excision7/12/22 (13 Jul 2022 06:34)      HPI:  76 year old male w/ Hx HTN, hyperlipidemia, anxiety, hypothyroidism p/w 1 year of worsening Lower back pain radiating to left leg now s/p L4- L5 lumbar laminectomy on 7/12/22. Patient has minimal pain in lower back and no sciatic pain seen ambulating with brace w/o difficulty. wants to go home.       Pain Symptoms if applicable:             	                         none	   mild         moderate         severe  Pain: 2	                            0	    1-3	     4-6	         7-10  Location: lower back	  Modifying factors: movement	  Associated symptoms:	    Function: [x ] Independent  [ ] Assistance  [ ] Total care  [ ] Non-ambulatory    Allergies    No Known Allergies    Intolerances        HOME MEDICATIONS: [x ] Reviewed  Ambien 5 mg oral tablet: Last Dose Taken:  , 1 tab(s) orally once a day (at bedtime)  Vitamin D3 1000 intl units (25 mcg) oral tablet: Last Dose Taken:  , 1  orally once a day  Centrum Silver Men's oral tablet: Last Dose Taken:  , 1 tab(s) orally once a day, last dose 07/05/22  irbesartan 75 mg oral tablet: Last Dose Taken:  , 1 tab(s) orally once a day, pm  KlonoPIN 0.5 mg oral tablet: Last Dose Taken:  , orally once a day, pm  metoprolol succinate 50 mg oral tablet, extended release: Last Dose Taken:  , 1 tab(s) orally once a day, pm  Norvasc 5 mg oral tablet: Last Dose Taken:  , 1 tab(s) orally once a day, pm  biotin 5 mg oral capsule: Last Dose Taken:  , 1 cap(s) orally once a day, last dose 07/05/22  QUEtiapine 50 mg oral tablet: Last Dose Taken:  , 1 tab(s) orally once a day (at bedtime)  mirtazapine 45 mg oral tablet: Last Dose Taken:  , 1 tab(s) orally once a day (at bedtime)  Synthroid 50 mcg (0.05 mg) oral tablet: Last Dose Taken:  , 1 tab(s) orally once a day  sertraline 25 mg oral tablet: Last Dose Taken:  , 1 tab(s) orally once a day, am  sertraline 50 mg oral tablet: Last Dose Taken:  , 1 tab(s) orally once a day, am  rosuvastatin 5 mg oral tablet: 1 tab(s) orally once a day (at bedtime)    MEDICATIONS  (STANDING):  acetaminophen     Tablet .. 975 milliGRAM(s) Oral every 8 hours  amLODIPine   Tablet 5 milliGRAM(s) Oral daily  atorvastatin 20 milliGRAM(s) Oral at bedtime  cholecalciferol 1000 Unit(s) Oral daily  clonazePAM  Tablet 0.5 milliGRAM(s) Oral at bedtime  gabapentin 100 milliGRAM(s) Oral three times a day  lactated ringers. 1000 milliLiter(s) (75 mL/Hr) IV Continuous <Continuous>  levothyroxine 50 MICROGram(s) Oral daily  losartan 25 milliGRAM(s) Oral daily  metoprolol succinate ER 50 milliGRAM(s) Oral at bedtime  mirtazapine 45 milliGRAM(s) Oral at bedtime  multivitamin 1 Tablet(s) Oral daily  pantoprazole    Tablet 40 milliGRAM(s) Oral before breakfast  polyethylene glycol 3350 17 Gram(s) Oral daily  QUEtiapine 75 milliGRAM(s) Oral daily  senna 2 Tablet(s) Oral at bedtime  sertraline 75 milliGRAM(s) Oral daily  traMADol 25 milliGRAM(s) Oral every 8 hours    MEDICATIONS  (PRN):  aluminum hydroxide/magnesium hydroxide/simethicone Suspension 30 milliLiter(s) Oral every 12 hours PRN Indigestion  bisacodyl 5 milliGRAM(s) Oral every 12 hours PRN Constipation  cyclobenzaprine 5 milliGRAM(s) Oral every 8 hours PRN Muscle Spasm  magnesium hydroxide Suspension 30 milliLiter(s) Oral every 12 hours PRN Constipation  ondansetron Injectable 4 milliGRAM(s) IV Push every 6 hours PRN Nausea and/or Vomiting  oxyCODONE    IR 2.5 milliGRAM(s) Oral every 4 hours PRN Moderate Pain (4 - 6)  oxyCODONE    IR 5 milliGRAM(s) Oral every 4 hours PRN Severe Pain (7 - 10)  zolpidem 5 milliGRAM(s) Oral at bedtime PRN Insomnia      PAST MEDICAL & SURGICAL HISTORY:  HTN (hypertension)      HLD (hyperlipidemia)      Situational anxiety      Former smoker      Renal cancer, left      Spondylolisthesis, lumbar region      Renal cyst      Radiculopathy, lumbar region      H/O needle biopsy  of prostate      S/P wrist surgery  left wrist, 30 years ago with hardware placement and excision of hardware months later      H/O melanoma excision  right shoulder in 2015      H/O left nephrectomy  Partial      [x ] Reviewed     SOCIAL HISTORY:  Residence: [ ] [facility]  [ ] SNF  [x ] Community  [x ] Substance abuse: none  [ x] Tobacco: 2 PPD x 7 years. Quit 50 years ago  [ x] Alcohol use: 1-2 drinks of liquor 3x/week    FAMILY HISTORY:  Family history of heart attack (Father)  father age 52  brother age 57    Family history of renal failure (Mother)        REVIEW OF SYSTEMS:    CONSTITUTIONAL: No fever, weight loss, or fatigue  EYES: No eye pain, visual disturbances, or discharge  ENMT:  No difficulty hearing, tinnitus, vertigo; No sinus or throat pain  NECK: No pain or stiffness  BREASTS: No pain, masses, or nipple discharge  RESPIRATORY: No cough, wheezing, chills or hemoptysis; No shortness of breath  CARDIOVASCULAR: No chest pain, palpitations, dizziness, or leg swelling  GASTROINTESTINAL: No abdominal or epigastric pain. No nausea, vomiting, or hematemesis; No diarrhea or constipation. No melena or hematochezia.  GENITOURINARY: No dysuria, frequency, hematuria, or incontinence  NEUROLOGICAL: No headaches, memory loss, loss of strength, numbness, or tremors  SKIN: No itching, burning, rashes, or lesions   LYMPH NODES: No enlarged glands  ENDOCRINE: No heat or cold intolerance; No hair loss  MUSCULOSKELETAL: back pain  PSYCHIATRIC: No depression, anxiety, mood swings, or difficulty sleeping  HEME/LYMPH: No easy bruising, or bleeding gums  ALLERGY AND IMMUNOLOGIC: No hives or eczema    [  x] All other ROS negative  [  ] Unable to obtain due to poor mental status    Vital Signs Last 24 Hrs  T(C): 36.4 (13 Jul 2022 09:25), Max: 36.7 (12 Jul 2022 10:50)  T(F): 97.5 (13 Jul 2022 09:25), Max: 98.1 (12 Jul 2022 10:50)  HR: 65 (13 Jul 2022 09:25) (63 - 90)  BP: 102/54 (13 Jul 2022 09:25) (102/54 - 141/70)  BP(mean): 84 (12 Jul 2022 12:30) (75 - 89)  RR: 18 (13 Jul 2022 09:25) (12 - 18)  SpO2: 98% (13 Jul 2022 09:25) (93% - 99%)    Parameters below as of 13 Jul 2022 09:25  Patient On (Oxygen Delivery Method): room air        PHYSICAL EXAM:    GENERAL: NAD, well-groomed, well-developed  HEAD:  Atraumatic, Normocephalic  EYES: EOMI, PERRLA, conjunctiva and sclera clear  ENMT: Moist mucous membranes  NECK: Supple, No JVD  RESPIRATORY: Clear to auscultation bilaterally; No rales, rhonchi, wheezing, or rubs  CARDIOVASCULAR: Regular rate and rhythm; No murmurs, rubs, or gallops  GASTROINTESTINAL: Soft, Nontender, Nondistended; Bowel sounds present  GENITOURINARY: Not examined  EXTREMITIES:  2+ Peripheral Pulses, No clubbing, cyanosis, or edema  NERVOUS SYSTEM:  Alert & Oriented X3; Moving all 4 extremities; No gross sensory deficits  HEME/LYMPH: No lymphadenopathy noted  SKIN: No rashes or lesions; Incisions C/D/I    LABS:                        11.1   8.81  )-----------( 176      ( 13 Jul 2022 05:55 )             31.3     07-13    142  |  108<H>  |  18  ----------------------------<  95  4.2   |  25  |  0.83    Ca    8.6      13 Jul 2022 05:55          CAPILLARY BLOOD GLUCOSE          RADIOLOGY & ADDITIONAL STUDIES:    EKG:   Personally Reviewed:  [ ] YES     Imaging: < from: Xray Lumbar Spine AP + Lateral (07.12.22 @ 09:21) >  IMPRESSION:  Distal tip of a surgical clamp projects over L5 spinous process.    Tangled radiodense ringlike material projecting above the clamp tip   related to crumpled gauze in the the operative field.    Correlate with preoperative workup and intraoperative findings.    < end of copied text >    Personally Reviewed:  [ x] YES               Consultant(s) notes reviewed:    Care Discussed with Consultant(s)/Other Providers: Ortho

## 2022-07-13 NOTE — DISCHARGE NOTE PROVIDER - NSDCHHCONTACT_GEN_ALL_CORE_FT
Viral Illness in Children: Care Instructions  Your Care Instructions    Viruses cause many illnesses in children, from colds and stomach flu to mumps. Sometimes children have general symptoms-such as not feeling like eating or just not feeling well-that do not fit with a specific illness. If your child has a rash, your doctor may be able to tell clearly if your child has an illness such as measles. Sometimes a child may have what is called a nonspecific viral illness that is not as easy to name. A number of viruses can cause this mild illness. Antibiotics do not work for a viral illness. Your child will probably feel better in a few days. If not, call your child's doctor. Follow-up care is a key part of your child's treatment and safety. Be sure to make and go to all appointments, and call your doctor if your child is having problems. It's also a good idea to know your child's test results and keep a list of the medicines your child takes. How can you care for your child at home? · Have your child rest.  · Give your child acetaminophen (Tylenol) or ibuprofen (Advil, Motrin) for fever, pain, or fussiness. Read and follow all instructions on the label. Do not give aspirin to anyone younger than 20. It has been linked to Reye syndrome, a serious illness. · Be careful when giving your child over-the-counter cold or flu medicines and Tylenol at the same time. Many of these medicines contain acetaminophen, which is Tylenol. Read the labels to make sure that you are not giving your child more than the recommended dose. Too much Tylenol can be harmful. · Be careful with cough and cold medicines. Don't give them to children younger than 6, because they don't work for children that age and can even be harmful. For children 6 and older, always follow all the instructions carefully. Make sure you know how much medicine to give and how long to use it. And use the dosing device if one is included.   · Give your child lots of fluids, enough so that the urine is light yellow or clear like water. This is very important if your child is vomiting or has diarrhea. Give your child sips of water or drinks such as Pedialyte or Infalyte. These drinks contain a mix of salt, sugar, and minerals. You can buy them at drugstores or grocery stores. Give these drinks as long as your child is throwing up or has diarrhea. Do not use them as the only source of liquids or food for more than 12 to 24 hours. · Keep your child home from school, day care, or other public places while he or she has a fever. · Use cold, wet cloths on a rash to reduce itching. When should you call for help? Call your doctor now or seek immediate medical care if:  ? · Your child has signs of needing more fluids. These signs include sunken eyes with few tears, dry mouth with little or no spit, and little or no urine for 6 hours. ? Watch closely for changes in your child's health, and be sure to contact your doctor if:  ? · Your child has a new or higher fever. ? · Your child is not feeling better within 2 days. ? · Your child's symptoms are getting worse. Where can you learn more? Go to http://michele-anil.info/. Enter [id number] [id number] in the search box to learn more about \"Viral Illness in Children: Care Instructions. \"  Current as of: March 3, 2017  Content Version: 11.4  © 8285-7265 [employer]. Care instructions adapted under license by [employer] (which disclaims liability or warranty for this information). If you have questions about a medical condition or this instruction, always ask your healthcare professional. [place] [address] any warranty or liability for your use of this information. As certified below, I, or a nurse practitioner or physician assistant working with me, had a face-to-face encounter that meets the physician face-to-face encounter requirements.

## 2022-07-13 NOTE — DISCHARGE NOTE PROVIDER - PROVIDER TOKENS
PROVIDER:[TOKEN:[[id number]],ESTABLISHEDPATIENT:[T]] PROVIDER:[TOKEN:[[id number]],FOLLOWUP:[2 weeks],ESTABLISHEDPATIENT:[T]]

## 2022-07-13 NOTE — CONSULT NOTE ADULT - PROBLEM SELECTOR RECOMMENDATION 9
s/p L4- L5 lumbar laminectomy on 7/12/22.   management as per ortho  pain controlled with Oxy IR prn  encouraged incentive spirometry  d/c planning as per primary team

## 2022-07-14 DIAGNOSIS — Z98.890 OTHER SPECIFIED POSTPROCEDURAL STATES: ICD-10-CM

## 2022-07-14 RX ORDER — TRAMADOL HYDROCHLORIDE 50 MG/1
0.5 TABLET ORAL
Qty: 10.5 | Refills: 0
Start: 2022-07-14 | End: 2022-07-20

## 2022-07-14 RX ORDER — TRAMADOL HYDROCHLORIDE 50 MG/1
50 TABLET, COATED ORAL
Qty: 28 | Refills: 0 | Status: ACTIVE | COMMUNITY
Start: 2022-07-14 | End: 1900-01-01

## 2022-07-19 LAB — SURGICAL PATHOLOGY STUDY: SIGNIFICANT CHANGE UP

## 2022-07-21 PROBLEM — M43.16 SPONDYLOLISTHESIS OF LUMBAR REGION: Status: ACTIVE | Noted: 2022-05-12

## 2022-07-21 PROCEDURE — 90834 PSYTX W PT 45 MINUTES: CPT | Mod: 95

## 2022-07-22 PROCEDURE — 99214 OFFICE O/P EST MOD 30 MIN: CPT | Mod: 95

## 2022-07-25 ENCOUNTER — APPOINTMENT (OUTPATIENT)
Dept: ORTHOPEDIC SURGERY | Facility: CLINIC | Age: 77
End: 2022-07-25

## 2022-07-25 VITALS — WEIGHT: 163 LBS | BODY MASS INDEX: 22.82 KG/M2 | HEIGHT: 71 IN

## 2022-07-25 DIAGNOSIS — M43.16 SPONDYLOLISTHESIS, LUMBAR REGION: ICD-10-CM

## 2022-07-25 PROBLEM — M54.16 RADICULOPATHY, LUMBAR REGION: Chronic | Status: ACTIVE | Noted: 2022-06-27

## 2022-07-25 PROBLEM — N28.1 CYST OF KIDNEY, ACQUIRED: Chronic | Status: ACTIVE | Noted: 2022-06-27

## 2022-07-25 PROCEDURE — 72100 X-RAY EXAM L-S SPINE 2/3 VWS: CPT

## 2022-07-25 PROCEDURE — 99024 POSTOP FOLLOW-UP VISIT: CPT

## 2022-07-25 NOTE — HISTORY OF PRESENT ILLNESS
[Walking] : worsened by walking [Rest] : relieved by rest [Improving] : improving [de-identified] : 76 year old male  presenting for follow up evaluation of low lumbar spine pain. First Post op visit after an L4, L5, and S1 Laminectomy and Removal of Extradural Mass\par Left leg pain started in September 2021 had been progressively getting worse. He states that his leg pains have improved since the surgery. However, he does get intermittent lateral lower leg pain. \par Overall he feels much better.\par No fever chills sweats nausea vomiting no bowel or bladder dysfunction, no recent weight loss or gain no night pain. This history is in addition to the intake form that I personally reviewed. \par He is here with his wife.

## 2022-07-25 NOTE — ADDENDUM
[FreeTextEntry1] : This note was written by Shailesh Coello on 07/25/2022 acting as scribe for Dr. Perfecto Rodriguez M.D.\par \par I, Perfecto Rodriguez MD, have read and attest that all the information, medical decision making and discharge instructions within are true and accurate.

## 2022-07-25 NOTE — DISCUSSION/SUMMARY
[de-identified] : L4, L5, and S1 Laminectomy.\par He states his surgery helped tremendously.\par Discussed all options. \par Walk.\par Wean from brace.\par Continue gabapentin \par Pt is going to [place], advised not to swim.\par F/U 1 month\par All options discussed and patient involved in decision making process including rest, medicine, home exercise, acupuncture, Chiropractic care, Physical Therapy, Pain management, and last resort surgery. All questions were answered, all alternatives discussed and the patient is in complete agreement with that plan. Follow-up appointment as instructed. Any issues and the patient will call or come in sooner.\par Wife agrees with the plan. \par

## 2022-07-25 NOTE — PHYSICAL EXAM
[Normal] : Gait: normal [Painter's Sign] : negative Painter's sign [Pronator Drift] : negative pronator drift [SLR] : negative straight leg raise [de-identified] : 5 out of 5 motor strength, sensation is intact and symmetrical limited range of motion flexion extension and rotation, no palpatory tenderness full range of motion of hips knees shoulders and elbows (all four extremities), no atrophy, negative straight leg raise, no swelling, normal ambulation, no apparent distress skin is intact, no upper or lower extremity instability, alert and oriented x 3 and normal mood. Normal finger-to nose test. \par Incision healing. [de-identified] : XR AP Lat Lumbar 07/25/2022 -L4, L5, and S1 Laminectomy, adequate- reviewed with the patient.

## 2022-07-26 PROCEDURE — 90837 PSYTX W PT 60 MINUTES: CPT | Mod: 95

## 2022-08-15 ENCOUNTER — APPOINTMENT (OUTPATIENT)
Dept: ORTHOPEDIC SURGERY | Facility: CLINIC | Age: 77
End: 2022-08-15

## 2022-08-15 VITALS — WEIGHT: 168 LBS | BODY MASS INDEX: 23.52 KG/M2 | HEIGHT: 71 IN

## 2022-08-15 PROCEDURE — 99024 POSTOP FOLLOW-UP VISIT: CPT

## 2022-08-15 PROCEDURE — 20552 NJX 1/MLT TRIGGER POINT 1/2: CPT | Mod: 58

## 2022-08-15 NOTE — PHYSICAL EXAM
[Normal] : Gait: normal [Painter's Sign] : negative Painter's sign [Pronator Drift] : negative pronator drift [SLR] : negative straight leg raise [de-identified] : 5 out of 5 motor strength, sensation is intact and symmetrical full range of motion flexion extension and rotation, no palpatory tenderness full range of motion of hips knees shoulders and elbows (all four extremities), no atrophy, negative straight leg raise, no pathological reflexes, no swelling, normal ambulation, no apparent distress skin is intact, no palpable lymph nodes, no upper or lower extremity instability, alert and oriented x3 and normal mood. Normal finger-to nose test. \par Right SI joint pain.\par Incision healed.

## 2022-08-15 NOTE — ADDENDUM
[FreeTextEntry1] : This note was written by Shailesh Coello on 08/15/2022 acting as scribe for Dr. Perfecto Rodriguez M.D.\par \par I, Perfecto Rodriguez MD, have read and attest that all the information, medical decision making and discharge instructions within are true and accurate.

## 2022-08-15 NOTE — DISCUSSION/SUMMARY
[de-identified] : L4, L5, and S1 Laminectomy and Removal of Extradural Mass 7/12/22.\par Right Sacroiliitis \par Discussed all options.\par I offered an injection after all risks were explained including allergic reaction to an infection under sterile conditions 1 mg of Depo-Medrol and 2 cc of 1% Lidocaine without epinephrine was injected into the painful site. The patient tolerated the procedure well and received significant relief following the injection. \par F/U 2 months.\par All options discussed including rest, medicine, home exercise, acupuncture, Chiropractic care, Physical Therapy, Pain management, and last resort surgery. All questions were answered, all alternatives discussed and the patient is in complete agreement with that plan. Follow-up appointment as instructed. Any issues and the patient will call or come in sooner.

## 2022-08-15 NOTE — HISTORY OF PRESENT ILLNESS
[Improving] : improving [de-identified] : 76 year old male presenting for follow up evaluation of low lumbar spine pain. Second Post op visit after an L4, L5, and S1 Laminectomy and Removal of Extradural Mass 7/12/22.\par Left leg pain started in September 2021 had been progressively getting worse. He states that his leg pains have improved since the surgery. However, he does get intermittent lateral lower leg pain. \par Has been having  R lower back pain for the past week. Pt describes the pain as achy \par Standing for prolonged periods exacerbates the pain\par Intermittent \par Overall, pain is stable\par No fever chills sweats nausea vomiting no bowel or bladder dysfunction, no recent weight loss or gain no night pain. This history is in addition to the intake form that I personally reviewed. \par No fever chills sweats nausea vomiting no bowel or bladder dysfunction, no recent weight loss or gain no night pain. This history is in addition to the intake form that I personally reviewed. \par \par \par He states the symptoms are improving. His symptoms occur while walking. \par \par Modifying factors - worsened by walking. Relieving factors include relieved by rest.

## 2022-08-19 RX ORDER — ACETAMINOPHEN 500 MG/1
500 TABLET, COATED ORAL
Qty: 180 | Refills: 3 | Status: ACTIVE | COMMUNITY
Start: 2022-08-19 | End: 1900-01-01

## 2022-08-24 ENCOUNTER — APPOINTMENT (OUTPATIENT)
Dept: ORTHOPEDIC SURGERY | Facility: CLINIC | Age: 77
End: 2022-08-24

## 2022-08-24 PROCEDURE — 72100 X-RAY EXAM L-S SPINE 2/3 VWS: CPT

## 2022-08-24 PROCEDURE — 99024 POSTOP FOLLOW-UP VISIT: CPT

## 2022-08-24 NOTE — HISTORY OF PRESENT ILLNESS
[Improving] : improving [de-identified] : 77 year old male presenting for follow up evaluation of low lumbar spine pain S/P L4, L5, and S1 Laminectomy and Removal of Extradural Mass 7/12/22.\par He complains of persistent R lower back pain. \par He did not have much relief from SI injection 8/15/22. \par Pain is worse when standing from a chair after sitting for a period of time. \par He also complains pain in the left calf for the past one week. \par He is taking ibuprofen 800 mg as needed. \par Currently taking care of his wife who is s/p TKR with Dr. Mcclain. \par No fever chills sweats nausea vomiting no bowel or bladder dysfunction, no recent weight loss or gain no night pain. This history is in addition to the intake form that I personally reviewed. \par

## 2022-08-24 NOTE — DISCUSSION/SUMMARY
[de-identified] : S/P lumbar laminectomy 6 weeks.\par Doing a lot of running around.\par He states his surgery helped tremendously with his radiating leg pain.\par Discussed all options. \par NSAIDs PRN.\par If no better PT.\par Taking care of wife. \par F/U 6 weeks.\par All options discussed and patient involved in decision making process including rest, medicine, home exercise, acupuncture, Chiropractic care, Physical Therapy, Pain management, and last resort surgery. All questions were answered, all alternatives discussed and the patient is in complete agreement with that plan. Follow-up appointment as instructed. Any issues and the patient will call or come in sooner.

## 2022-08-24 NOTE — PHYSICAL EXAM
[Normal] : Gait: normal [Painter's Sign] : negative Painter's sign [Pronator Drift] : negative pronator drift [SLR] : negative straight leg raise [de-identified] : 5 out of 5 motor strength, sensation is intact and symmetrical full range of motion flexion extension and rotation, no palpatory tenderness full range of motion of hips knees shoulders and elbows (all four extremities), no atrophy, negative straight leg raise, no pathological reflexes, no swelling, normal ambulation, no apparent distress skin is intact, no palpable lymph nodes, no upper or lower extremity instability, alert and oriented x3 and normal mood. Normal finger-to nose test. Incision healed.  [de-identified] : XR AP Lat Lumbar 08/24/2022-lumbar degenerative disc disease with a laminectomy, no instability-reviewed with patient.

## 2022-08-30 ENCOUNTER — APPOINTMENT (OUTPATIENT)
Dept: SURGICAL ONCOLOGY | Facility: CLINIC | Age: 77
End: 2022-08-30

## 2022-08-30 VITALS
WEIGHT: 170 LBS | OXYGEN SATURATION: 98 % | BODY MASS INDEX: 24.34 KG/M2 | SYSTOLIC BLOOD PRESSURE: 131 MMHG | HEIGHT: 70 IN | DIASTOLIC BLOOD PRESSURE: 82 MMHG | RESPIRATION RATE: 16 BRPM | HEART RATE: 60 BPM | TEMPERATURE: 98.6 F

## 2022-08-30 DIAGNOSIS — C79.9 SECONDARY MALIGNANT NEOPLASM OF UNSPECIFIED SITE: ICD-10-CM

## 2022-08-30 PROCEDURE — 99213 OFFICE O/P EST LOW 20 MIN: CPT

## 2022-09-01 PROBLEM — C79.9 METASTATIC MELANOMA: Status: ACTIVE | Noted: 2022-08-30

## 2022-09-01 NOTE — ASSESSMENT
[FreeTextEntry1] : Right shoulder melanoma.\par Unclear if this represents new melanoma versus in transit metastasis.\par \par Plan: review pathology.  PET/CT scan to evaluate for additional metastatic disease.  Plan for excision.

## 2022-09-01 NOTE — PHYSICAL EXAM
[FreeTextEntry1] : Right shoulder: biopsy site anterior/medial edge of right shoulder melanoma excision/reconstruction site.\par No palpable right axillary adenopathy. [Normal] : supple, no neck mass and thyroid not enlarged [Normal Neck Lymph Nodes] : normal neck lymph nodes  [Normal Supraclavicular Lymph Nodes] : normal supraclavicular lymph nodes [Normal Groin Lymph Nodes] : normal groin lymph nodes [Normal Axillary Lymph Nodes] : normal axillary lymph nodes [Normal] : oriented to person, place and time, with appropriate affect

## 2022-09-01 NOTE — HISTORY OF PRESENT ILLNESS
[de-identified] : Jaquan is a 71 year old male presents to office for 3 month follow up visit. 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 Final pathology revealed a [id number], 0.31 mm in thickness, negative margins, no residual neoplasm. Today he is without any complaints. Denies new lesions or masses. Denies bleeding or pruritic moles. Denies pain or constitutional symptoms. He continues ongoing dermatologic surveillance with Dr. Antione Vazquez and reports exam last Tuesday was unremarkable. Continues to avoid sun and applies sunblock. \par \par Chest Xray 7/13/16: no evidence of disease. \par ---------------------------------------------------------------------------------------------------\par 04/17/2018: 73 y/o male with history of 0.31 mm thick melanoma excised from the right shoulder three years ago presents for follow up.  Currently distressed as he had biopsy of cutaneous lesion adjacent to scar of right shoulder revealing an at least 0.8 mm thick melanoma.  He has been referred back to us by his dermatologist for surgical treatment.\par Patient denies pain at biopsy site.  He states he had another biopsy of the right back which revealed basal cell cancer to be treated by Dr. Vazquez.\par \par 05/22/2018:  Patient is s/p wide excision of right shoulder melanoma and right axillary sentinel lymph node biopsy 05/09/2018.  Recovering well.\par Pathology: 0.8 mm melanoma, margins negative.  Three right axillary sentinel lymph nodes negative.\par \par 08/21/2018:  Patient presents for 6 months follow up.  Feels well.  Denies new cutaneous malignancies.  He continues to follow up with dermatology.  He denies pain, swelling or new nodularity at right shoulder excision site and right arm/axilla.\par \par 08/30/2022: Patient recently underwent biopsy of a right shoulder cutaneous lesion 08/22/2022.  This is in the region of the previously resected right shoulder melanoma 05/2018.\par Pathology: fragments of malignant melanoma for which metastatic melanoma versus 0.6 mm thick melanoma could not be differentiated on biopsy specimen.

## 2022-09-02 ENCOUNTER — NON-APPOINTMENT (OUTPATIENT)
Age: 77
End: 2022-09-02

## 2022-09-06 ENCOUNTER — NON-APPOINTMENT (OUTPATIENT)
Age: 77
End: 2022-09-06

## 2022-09-06 ENCOUNTER — APPOINTMENT (OUTPATIENT)
Dept: PLASTIC SURGERY | Facility: CLINIC | Age: 77
End: 2022-09-06

## 2022-09-06 VITALS
WEIGHT: 170 LBS | HEART RATE: 66 BPM | OXYGEN SATURATION: 96 % | TEMPERATURE: 97.8 F | BODY MASS INDEX: 24.34 KG/M2 | DIASTOLIC BLOOD PRESSURE: 76 MMHG | HEIGHT: 70 IN | SYSTOLIC BLOOD PRESSURE: 124 MMHG

## 2022-09-06 DIAGNOSIS — C64.9 MALIGNANT NEOPLASM OF UNSPECIFIED KIDNEY, EXCEPT RENAL PELVIS: ICD-10-CM

## 2022-09-06 PROCEDURE — 99203 OFFICE O/P NEW LOW 30 MIN: CPT

## 2022-09-06 NOTE — REASON FOR VISIT
[Consultation] : a consultation visit [FreeTextEntry1] : Pt present with new dx of melanoma, referred by Scott. He has melanoma on his upper right back for approximately 3 weeks. Pt denies itching, bleeding, drainage and/ or growth. He has hx of skin cancer of BCC on his right shoulder X1 on his right shoulder in [id number]. Pt denies Fhx of skin cancer.  [Follow-Up Visit] : a follow-up visit for [Melanoma] : melanoma [Spouse] : spouse

## 2022-09-07 ENCOUNTER — RESULT REVIEW (OUTPATIENT)
Age: 77
End: 2022-09-07

## 2022-09-07 ENCOUNTER — OUTPATIENT (OUTPATIENT)
Dept: OUTPATIENT SERVICES | Facility: HOSPITAL | Age: 77
LOS: 1 days | End: 2022-09-07

## 2022-09-07 ENCOUNTER — OUTPATIENT (OUTPATIENT)
Dept: OUTPATIENT SERVICES | Facility: HOSPITAL | Age: 77
LOS: 1 days | End: 2022-09-07
Payer: MEDICARE

## 2022-09-07 VITALS
OXYGEN SATURATION: 98 % | HEART RATE: 62 BPM | DIASTOLIC BLOOD PRESSURE: 70 MMHG | RESPIRATION RATE: 18 BRPM | WEIGHT: 169.09 LBS | SYSTOLIC BLOOD PRESSURE: 110 MMHG | HEIGHT: 70 IN | TEMPERATURE: 97 F

## 2022-09-07 DIAGNOSIS — Z90.5 ACQUIRED ABSENCE OF KIDNEY: Chronic | ICD-10-CM

## 2022-09-07 DIAGNOSIS — Z98.890 OTHER SPECIFIED POSTPROCEDURAL STATES: Chronic | ICD-10-CM

## 2022-09-07 DIAGNOSIS — F41.9 ANXIETY DISORDER, UNSPECIFIED: ICD-10-CM

## 2022-09-07 DIAGNOSIS — I10 ESSENTIAL (PRIMARY) HYPERTENSION: ICD-10-CM

## 2022-09-07 DIAGNOSIS — Z01.812 ENCOUNTER FOR PREPROCEDURAL LABORATORY EXAMINATION: ICD-10-CM

## 2022-09-07 DIAGNOSIS — E03.9 HYPOTHYROIDISM, UNSPECIFIED: ICD-10-CM

## 2022-09-07 DIAGNOSIS — C43.9 MALIGNANT MELANOMA OF SKIN, UNSPECIFIED: ICD-10-CM

## 2022-09-07 DIAGNOSIS — C43.61 MALIGNANT MELANOMA OF RIGHT UPPER LIMB, INCLUDING SHOULDER: ICD-10-CM

## 2022-09-07 LAB
ALBUMIN SERPL ELPH-MCNC: 4.8 G/DL — SIGNIFICANT CHANGE UP (ref 3.3–5)
ALP SERPL-CCNC: 50 U/L — SIGNIFICANT CHANGE UP (ref 40–120)
ALT FLD-CCNC: 18 U/L — SIGNIFICANT CHANGE UP (ref 4–41)
ANION GAP SERPL CALC-SCNC: 16 MMOL/L — HIGH (ref 7–14)
AST SERPL-CCNC: 19 U/L — SIGNIFICANT CHANGE UP (ref 4–40)
BILIRUB SERPL-MCNC: 0.4 MG/DL — SIGNIFICANT CHANGE UP (ref 0.2–1.2)
BUN SERPL-MCNC: 24 MG/DL — HIGH (ref 7–23)
CALCIUM SERPL-MCNC: 9.7 MG/DL — SIGNIFICANT CHANGE UP (ref 8.4–10.5)
CHLORIDE SERPL-SCNC: 106 MMOL/L — SIGNIFICANT CHANGE UP (ref 98–107)
CO2 SERPL-SCNC: 24 MMOL/L — SIGNIFICANT CHANGE UP (ref 22–31)
CREAT SERPL-MCNC: 0.75 MG/DL — SIGNIFICANT CHANGE UP (ref 0.5–1.3)
EGFR: 92 ML/MIN/1.73M2 — SIGNIFICANT CHANGE UP
GLUCOSE SERPL-MCNC: 98 MG/DL — SIGNIFICANT CHANGE UP (ref 70–99)
HCT VFR BLD CALC: 42.7 % — SIGNIFICANT CHANGE UP (ref 39–50)
HGB BLD-MCNC: 13.7 G/DL — SIGNIFICANT CHANGE UP (ref 13–17)
MCHC RBC-ENTMCNC: 30.4 PG — SIGNIFICANT CHANGE UP (ref 27–34)
MCHC RBC-ENTMCNC: 32.1 GM/DL — SIGNIFICANT CHANGE UP (ref 32–36)
MCV RBC AUTO: 94.7 FL — SIGNIFICANT CHANGE UP (ref 80–100)
NRBC # BLD: 0 /100 WBCS — SIGNIFICANT CHANGE UP (ref 0–0)
NRBC # FLD: 0 K/UL — SIGNIFICANT CHANGE UP (ref 0–0)
PLATELET # BLD AUTO: 211 K/UL — SIGNIFICANT CHANGE UP (ref 150–400)
POTASSIUM SERPL-MCNC: 4.9 MMOL/L — SIGNIFICANT CHANGE UP (ref 3.5–5.3)
POTASSIUM SERPL-SCNC: 4.9 MMOL/L — SIGNIFICANT CHANGE UP (ref 3.5–5.3)
PROT SERPL-MCNC: 7.7 G/DL — SIGNIFICANT CHANGE UP (ref 6–8.3)
RBC # BLD: 4.51 M/UL — SIGNIFICANT CHANGE UP (ref 4.2–5.8)
RBC # FLD: 14.7 % — HIGH (ref 10.3–14.5)
SODIUM SERPL-SCNC: 146 MMOL/L — HIGH (ref 135–145)
WBC # BLD: 5.17 K/UL — SIGNIFICANT CHANGE UP (ref 3.8–10.5)
WBC # FLD AUTO: 5.17 K/UL — SIGNIFICANT CHANGE UP (ref 3.8–10.5)

## 2022-09-07 PROCEDURE — 88321 CONSLTJ&REPRT SLD PREP ELSWR: CPT

## 2022-09-07 PROCEDURE — 90834 PSYTX W PT 45 MINUTES: CPT | Mod: 95

## 2022-09-07 PROCEDURE — 99214 OFFICE O/P EST MOD 30 MIN: CPT | Mod: 95

## 2022-09-07 RX ORDER — IRBESARTAN 75 MG/1
1 TABLET ORAL
Qty: 0 | Refills: 0 | DISCHARGE

## 2022-09-07 RX ORDER — MULTIVIT-MIN/FERROUS GLUCONATE 9 MG/15 ML
1 LIQUID (ML) ORAL
Qty: 0 | Refills: 0 | DISCHARGE

## 2022-09-07 RX ORDER — LEVOTHYROXINE SODIUM 125 MCG
1 TABLET ORAL
Qty: 0 | Refills: 0 | DISCHARGE

## 2022-09-07 RX ORDER — SERTRALINE 25 MG/1
1 TABLET, FILM COATED ORAL
Qty: 0 | Refills: 0 | DISCHARGE

## 2022-09-07 RX ORDER — QUETIAPINE FUMARATE 200 MG/1
1 TABLET, FILM COATED ORAL
Qty: 0 | Refills: 0 | DISCHARGE

## 2022-09-07 RX ORDER — CHOLECALCIFEROL (VITAMIN D3) 125 MCG
1 CAPSULE ORAL
Qty: 0 | Refills: 0 | DISCHARGE

## 2022-09-07 RX ORDER — METOPROLOL TARTRATE 50 MG
1 TABLET ORAL
Qty: 0 | Refills: 0 | DISCHARGE

## 2022-09-07 RX ORDER — AMLODIPINE BESYLATE 2.5 MG/1
1 TABLET ORAL
Qty: 0 | Refills: 0 | DISCHARGE

## 2022-09-07 RX ORDER — ZOLPIDEM TARTRATE 10 MG/1
1 TABLET ORAL
Qty: 0 | Refills: 0 | DISCHARGE

## 2022-09-07 RX ORDER — ROSUVASTATIN CALCIUM 5 MG/1
1 TABLET ORAL
Qty: 0 | Refills: 0 | DISCHARGE

## 2022-09-07 RX ORDER — CLONAZEPAM 1 MG
0 TABLET ORAL
Qty: 0 | Refills: 0 | DISCHARGE

## 2022-09-07 RX ORDER — MIRTAZAPINE 45 MG/1
1 TABLET, ORALLY DISINTEGRATING ORAL
Qty: 0 | Refills: 0 | DISCHARGE

## 2022-09-07 NOTE — H&P PST ADULT - NSICDXPASTSURGICALHX_GEN_ALL_CORE_FT
PAST SURGICAL HISTORY:  H/O left nephrectomy Partial    H/O melanoma excision right shoulder in 2015    H/O needle biopsy of prostate    S/P wrist surgery left wrist, 30 years ago with hardware placement and excision of hardware months later     PAST SURGICAL HISTORY:  H/O left nephrectomy Partial    H/O melanoma excision right shoulder in 2015    H/O needle biopsy of prostate    S/P laminectomy lumbar 8/2022    S/P wrist surgery left wrist, 30 years ago with hardware placement and excision of hardware months later

## 2022-09-07 NOTE — H&P PST ADULT - MUSCULOSKELETAL COMMENTS
MRI Lumbar spine  05/12/2021 showed  L4- 5 spinal stenosis, synovial cyst-with moderate disc degeneration radiating down to S1 s/p lumbar laminectomy  & excision of cyst August 2022

## 2022-09-07 NOTE — H&P PST ADULT - POSTERIOR CERVICAL L
normal Information: Selecting Yes will display possible errors in your note based on the variables you have selected. This validation is only offered as a suggestion for you. PLEASE NOTE THAT THE VALIDATION TEXT WILL BE REMOVED WHEN YOU FINALIZE YOUR NOTE. IF YOU WANT TO FAX A PRELIMINARY NOTE YOU WILL NEED TO TOGGLE THIS TO 'NO' IF YOU DO NOT WANT IT IN YOUR FAXED NOTE.

## 2022-09-07 NOTE — H&P PST ADULT - LYMPHATIC
posterior cervical L/posterior cervical R/anterior cervical L/anterior cervical R/supraclavicular L/No lymphadedenopathy

## 2022-09-07 NOTE — H&P PST ADULT - PROBLEM SELECTOR PLAN 1
Scheduled fo excision of right shoulder melanoma with plastics on 9/14/22  Written & verbal preop instructions, gi prophylaxis & surgical soap given  Pt verbalized good understanding.  Teach back done on surgical soap instructions.

## 2022-09-07 NOTE — H&P PST ADULT - NSICDXPASTMEDICALHX_GEN_ALL_CORE_FT
PAST MEDICAL HISTORY:  Former smoker     HLD (hyperlipidemia)     HTN (hypertension)     Radiculopathy, lumbar region     Renal cancer, left     Renal cyst     Situational anxiety     Spondylolisthesis, lumbar region      PAST MEDICAL HISTORY:  Former smoker     HLD (hyperlipidemia)     HTN (hypertension)     Malignant melanoma of skin, unspecified     Radiculopathy, lumbar region     Renal cancer, left     Renal cyst     Situational anxiety     Spondylolisthesis, lumbar region

## 2022-09-07 NOTE — H&P PST ADULT - LAST CARDIAC ANGIOGRAM/IMAGING
5-7 years ago at [facility] . denies stent placement >5years ago at [facility] . denies stent placement

## 2022-09-07 NOTE — H&P PST ADULT - MUSCULOSKELETAL
details… ROM intact/normal gait/strength 5/5 bilateral upper extremities/strength 5/5 bilateral lower extremities healed incisional scar mid lower back/ROM intact/normal gait/strength 5/5 bilateral upper extremities/strength 5/5 bilateral lower extremities

## 2022-09-07 NOTE — H&P PST ADULT - HISTORY OF PRESENT ILLNESS
76 year old male with pre  op dx of radiculopathy lumbar region is scheduled for L4- L5 lumbar laminectomy. 78 y/o male presents for preop eval for scheduled excision of right shoulder melanoma with plastics.  Pt states self detect right shoulder lesion and followed up with dermatology.  Biopsy done.  Preop dx malignant melanoma of skin unspecified.

## 2022-09-07 NOTE — H&P PST ADULT - ENERGY EXPENDITURE (METS)
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    cw lasix, aldactone-- fu renal fxn, electrolytes  Monitor Diuretic response- adjust diuretics if rapid increase in ascites  Encourage compliance with fluid restriction.    4

## 2022-09-08 ENCOUNTER — OUTPATIENT (OUTPATIENT)
Dept: OUTPATIENT SERVICES | Facility: HOSPITAL | Age: 77
LOS: 1 days | End: 2022-09-08
Payer: MEDICARE

## 2022-09-08 ENCOUNTER — APPOINTMENT (OUTPATIENT)
Dept: NUCLEAR MEDICINE | Facility: IMAGING CENTER | Age: 77
End: 2022-09-08

## 2022-09-08 DIAGNOSIS — Z98.890 OTHER SPECIFIED POSTPROCEDURAL STATES: Chronic | ICD-10-CM

## 2022-09-08 DIAGNOSIS — Z90.5 ACQUIRED ABSENCE OF KIDNEY: Chronic | ICD-10-CM

## 2022-09-08 DIAGNOSIS — C79.9 SECONDARY MALIGNANT NEOPLASM OF UNSPECIFIED SITE: ICD-10-CM

## 2022-09-08 DIAGNOSIS — C43.61 MALIGNANT MELANOMA OF RIGHT UPPER LIMB, INCLUDING SHOULDER: ICD-10-CM

## 2022-09-08 PROBLEM — C43.9 MALIGNANT MELANOMA OF SKIN, UNSPECIFIED: Chronic | Status: ACTIVE | Noted: 2022-09-07

## 2022-09-08 PROCEDURE — A9552: CPT

## 2022-09-08 PROCEDURE — 78816 PET IMAGE W/CT FULL BODY: CPT | Mod: 26,PI,MH

## 2022-09-08 PROCEDURE — 78816 PET IMAGE W/CT FULL BODY: CPT

## 2022-09-09 ENCOUNTER — LABORATORY RESULT (OUTPATIENT)
Age: 77
End: 2022-09-09

## 2022-09-12 PROCEDURE — 90834 PSYTX W PT 45 MINUTES: CPT | Mod: 95

## 2022-09-13 VITALS
DIASTOLIC BLOOD PRESSURE: 60 MMHG | OXYGEN SATURATION: 98 % | WEIGHT: 169.09 LBS | RESPIRATION RATE: 16 BRPM | SYSTOLIC BLOOD PRESSURE: 132 MMHG | HEART RATE: 59 BPM | HEIGHT: 70 IN | TEMPERATURE: 97 F

## 2022-09-13 NOTE — ASU PREOPERATIVE ASSESSMENT, ADULT (IPARK ONLY) - FALL HARM RISK - UNIVERSAL INTERVENTIONS
Call bell, personal items and telephone in reach/Instruct patient to call for assistance before getting out of bed or chair/Non-slip footwear when patient is out of bed/[place] to call system/Physically safe environment - no spills, clutter or unnecessary equipment/Purposeful Proactive Rounding

## 2022-09-14 ENCOUNTER — OUTPATIENT (OUTPATIENT)
Dept: OUTPATIENT SERVICES | Facility: HOSPITAL | Age: 77
LOS: 1 days | Discharge: HOME | End: 2022-09-14
Payer: MEDICARE

## 2022-09-14 ENCOUNTER — TRANSCRIPTION ENCOUNTER (OUTPATIENT)
Age: 77
End: 2022-09-14

## 2022-09-14 ENCOUNTER — APPOINTMENT (OUTPATIENT)
Dept: PLASTIC SURGERY | Facility: HOSPITAL | Age: 77
End: 2022-09-14

## 2022-09-14 ENCOUNTER — APPOINTMENT (OUTPATIENT)
Dept: SURGICAL ONCOLOGY | Facility: AMBULATORY SURGERY CENTER | Age: 77
End: 2022-09-14

## 2022-09-14 ENCOUNTER — RESULT REVIEW (OUTPATIENT)
Age: 77
End: 2022-09-14

## 2022-09-14 VITALS
TEMPERATURE: 98 F | RESPIRATION RATE: 16 BRPM | OXYGEN SATURATION: 98 % | HEART RATE: 61 BPM | SYSTOLIC BLOOD PRESSURE: 131 MMHG | DIASTOLIC BLOOD PRESSURE: 80 MMHG

## 2022-09-14 DIAGNOSIS — Z90.5 ACQUIRED ABSENCE OF KIDNEY: Chronic | ICD-10-CM

## 2022-09-14 DIAGNOSIS — Z98.890 OTHER SPECIFIED POSTPROCEDURAL STATES: Chronic | ICD-10-CM

## 2022-09-14 DIAGNOSIS — C43.9 MALIGNANT MELANOMA OF SKIN, UNSPECIFIED: ICD-10-CM

## 2022-09-14 PROCEDURE — 14021 TIS TRNFR S/A/L 10.1-30 SQCM: CPT

## 2022-09-14 PROCEDURE — 11606 EXC TR-EXT MAL+MARG >4 CM: CPT

## 2022-09-14 NOTE — BRIEF OPERATIVE NOTE - NSICDXBRIEFPROCEDURE_GEN_ALL_CORE_FT
PROCEDURES:  Excision of malignant lesion of trunk, 3.1 to 3.5cm 14-Sep-2022 11:35:39  Zena Sage  
PROCEDURES:  Excision of malignant lesion of trunk, 3.1 to 3.5cm 14-Sep-2022 11:35:28  Yari Hilario

## 2022-09-14 NOTE — ASU DISCHARGE PLAN (ADULT/PEDIATRIC) - NS MD DC FALL RISK RISK
For information on Fall & Injury Prevention, visit: [email or web address] OR  [email or web address] OR  https://www.cdc.gov/steadi/patient.html

## 2022-09-14 NOTE — BRIEF OPERATIVE NOTE - OPERATION/FINDINGS
Excision of melanoma of the right shoulder. Elliptical incision made with 1 cm margins. Specimen excised using sharp dissection. Hemostasis achieved. PRS closure.
Right shoulder melanoma was excised resulting in a 3.0 cm x 9.0 cm defect. The area was closed with 3-0 Monocryl and dressed with Dermabond art.

## 2022-09-14 NOTE — ASU DISCHARGE PLAN (ADULT/PEDIATRIC) - ASU DC SPECIAL INSTRUCTIONSFT
Take Tylenol up to 975mg and Ibuprofen up to 600mg every 6 hours, alternating so that you take one medication, then the other every 3 hours. Take Tylenol up to 975mg and Ibuprofen up to 600mg every 6 hours, alternating so that you take one medication, then the other every 3 hours.    You may shower. Pat Dry abdomen. Leave the white steri strips in place, they will fall off on their own in approximately 5-7 days.

## 2022-09-14 NOTE — ASU DISCHARGE PLAN (ADULT/PEDIATRIC) - CARE PROVIDER_API CALL
Jovanny Chavez)  Surgery  [address] [place] [address]  Phone: [phone]  Fax: [phone]  Follow Up Time: 2 weeks    Germán Escobar)  Plastic Surgery  [address] [place] [address]  Phone: [phone]  Fax: [phone]  Follow Up Time: 2 weeks

## 2022-09-14 NOTE — ASU DISCHARGE PLAN (ADULT/PEDIATRIC) - PROVIDER TOKENS
PROVIDER:[TOKEN:[[id number]],FOLLOWUP:[2 weeks]],PROVIDER:[TOKEN:[[id number]],FOLLOWUP:[2 weeks]]

## 2022-09-14 NOTE — ASU DISCHARGE PLAN (ADULT/PEDIATRIC) - SPECIFY DIET AND FLUID
Alert and awake. Vital signs stable.Tolerating po. Peripheral IV discontinued. Meets all discharge criteria, medications reconciled, seen and cleared for discharge home by anesthesia. Discharge instructions given and understood.  Post operative pain management expectation and safety education given to patient and family. Pain management sheet reviewed and given. Surgical site clean dry intact. Accompanied by family. No questions or concerns at this time. Keep first meal light. Nothing fried, spicy or greasy. Increase fluids.

## 2022-09-22 ENCOUNTER — APPOINTMENT (OUTPATIENT)
Dept: ORTHOPEDIC SURGERY | Facility: CLINIC | Age: 77
End: 2022-09-22

## 2022-09-22 PROCEDURE — 99024 POSTOP FOLLOW-UP VISIT: CPT

## 2022-09-22 NOTE — DISCUSSION/SUMMARY
[de-identified] : S/P L4, L5, and S1 Laminectomy and Removal of Extradural Mass 7/12/22.\par Doing a lot of running around.\par He states his surgery helped tremendously with his radiating leg pain.\par Discussed all options. \par NSAIDs PRN.\par If no better PT.\par Taking care of wife. \par F/U 6 weeks.\par All options discussed and patient involved in decision making process including rest, medicine, home exercise, acupuncture, Chiropractic care, Physical Therapy, Pain management, and last resort surgery. All questions were answered, all alternatives discussed and the patient is in complete agreement with that plan. Follow-up appointment as instructed. Any issues and the patient will call or come in sooner.

## 2022-09-22 NOTE — HISTORY OF PRESENT ILLNESS
[Improving] : improving [de-identified] : 77 year old male presenting for follow up evaluation of low lumbar spine pain \par S/P L4, L5, and S1 Laminectomy and Removal of Extradural Mass 7/12/22.\par He complains of persistent R lower back pain. \par He did not have much relief from SI injection 8/15/22. \par Pain is worse when standing from a chair after sitting for a period of time. \par He also complains pain in the left calf \par He is taking ibuprofen 800 mg as needed. \par Melanoma right shoulder. \par Currently taking care of his wife who is s/p TKR with Dr. Mcclain. \par S/P melanoma surgery of his R shoulder about 3 weeks ago. \par No fever chills sweats nausea vomiting no bowel or bladder dysfunction, no recent weight loss or gain no night pain. This history is in addition to the intake form that I personally reviewed. \par

## 2022-09-22 NOTE — PHYSICAL EXAM
[Normal] : Gait: normal [Painter's Sign] : negative Painter's sign [Pronator Drift] : negative pronator drift [SLR] : negative straight leg raise [de-identified] : 5 out of 5 motor strength, sensation is intact and symmetrical full range of motion flexion extension and rotation, no palpatory tenderness full range of motion of hips knees shoulders and elbows (all four extremities), no atrophy, negative straight leg raise, no pathological reflexes, no swelling, normal ambulation, no apparent distress skin is intact, no palpable lymph nodes, no upper or lower extremity instability, alert and oriented x3 and normal mood. Normal finger-to nose test. Incision healed.  [de-identified] : XR AP Lat Lumbar 08/24/2022-lumbar degenerative disc disease with a laminectomy, no instability-reviewed with patient.

## 2022-09-27 ENCOUNTER — APPOINTMENT (OUTPATIENT)
Dept: SURGICAL ONCOLOGY | Facility: CLINIC | Age: 77
End: 2022-09-27

## 2022-09-27 ENCOUNTER — APPOINTMENT (OUTPATIENT)
Dept: PLASTIC SURGERY | Facility: CLINIC | Age: 77
End: 2022-09-27

## 2022-09-27 VITALS
TEMPERATURE: 98 F | OXYGEN SATURATION: 97 % | DIASTOLIC BLOOD PRESSURE: 69 MMHG | HEART RATE: 78 BPM | SYSTOLIC BLOOD PRESSURE: 110 MMHG | WEIGHT: 170 LBS | HEIGHT: 70 IN | BODY MASS INDEX: 24.34 KG/M2

## 2022-09-27 LAB — SURGICAL PATHOLOGY STUDY: SIGNIFICANT CHANGE UP

## 2022-09-27 PROCEDURE — 99024 POSTOP FOLLOW-UP VISIT: CPT

## 2022-09-27 NOTE — HISTORY OF PRESENT ILLNESS
[de-identified] : Jaquan is a 71 year old male presents to office for 3 month follow up visit. 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 Final pathology revealed a [id number], 0.31 mm in thickness, negative margins, no residual neoplasm. Today he is without any complaints. Denies new lesions or masses. Denies bleeding or pruritic moles. Denies pain or constitutional symptoms. He continues ongoing dermatologic surveillance with Dr. Antione Vazquez and reports exam last Tuesday was unremarkable. Continues to avoid sun and applies sunblock. \par \par Chest Xray 7/13/16: no evidence of disease. \par ---------------------------------------------------------------------------------------------------\par 04/17/2018: 73 y/o male with history of 0.31 mm thick melanoma excised from the right shoulder three years ago presents for follow up.  Currently distressed as he had biopsy of cutaneous lesion adjacent to scar of right shoulder revealing an at least 0.8 mm thick melanoma.  He has been referred back to us by his dermatologist for surgical treatment.\par Patient denies pain at biopsy site.  He states he had another biopsy of the right back which revealed basal cell cancer to be treated by Dr. Vazquez.\par \par 05/22/2018:  Patient is s/p wide excision of right shoulder melanoma and right axillary sentinel lymph node biopsy 05/09/2018.  Recovering well.\par Pathology: 0.8 mm melanoma, margins negative.  Three right axillary sentinel lymph nodes negative.\par \par 08/21/2018:  Patient presents for 6 months follow up.  Feels well.  Denies new cutaneous malignancies.  He continues to follow up with dermatology.  He denies pain, swelling or new nodularity at right shoulder excision site and right arm/axilla.\par \par 08/30/2022: Patient recently underwent biopsy of a right shoulder cutaneous lesion 08/22/2022.  This is in the region of the previously resected right shoulder melanoma 05/2018.\par Pathology: fragments of malignant melanoma for which metastatic melanoma versus 0.6 mm thick melanoma could not be differentiated on biopsy specimen.\par \par 09/27/2022: Patient is s/p excision of right shoulder melanoma 09/14/2022.  He is recovering well and denies pain.\par Pathology has not resulted

## 2022-09-28 LAB — SURGICAL PATHOLOGY STUDY: SIGNIFICANT CHANGE UP

## 2022-09-29 PROCEDURE — 90834 PSYTX W PT 45 MINUTES: CPT | Mod: 95

## 2022-10-12 PROCEDURE — 90834 PSYTX W PT 45 MINUTES: CPT | Mod: 95

## 2022-10-13 PROCEDURE — 99443: CPT | Mod: 95

## 2022-10-17 ENCOUNTER — RX RENEWAL (OUTPATIENT)
Age: 77
End: 2022-10-17

## 2022-10-17 RX ORDER — IBUPROFEN 600 MG/1
600 TABLET, FILM COATED ORAL 3 TIMES DAILY
Qty: 90 | Refills: 0 | Status: ACTIVE | COMMUNITY
Start: 2022-08-19 | End: 1900-01-01

## 2022-11-02 PROCEDURE — 90834 PSYTX W PT 45 MINUTES: CPT | Mod: 95

## 2022-11-07 PROCEDURE — 99214 OFFICE O/P EST MOD 30 MIN: CPT | Mod: 95

## 2022-11-08 PROCEDURE — 90834 PSYTX W PT 45 MINUTES: CPT | Mod: 95

## 2022-11-09 ENCOUNTER — APPOINTMENT (OUTPATIENT)
Dept: ORTHOPEDIC SURGERY | Facility: CLINIC | Age: 77
End: 2022-11-09

## 2022-11-09 VITALS
DIASTOLIC BLOOD PRESSURE: 69 MMHG | HEIGHT: 71 IN | HEART RATE: 65 BPM | WEIGHT: 170 LBS | BODY MASS INDEX: 23.8 KG/M2 | SYSTOLIC BLOOD PRESSURE: 121 MMHG

## 2022-11-09 PROCEDURE — 99213 OFFICE O/P EST LOW 20 MIN: CPT

## 2022-11-09 RX ORDER — DICLOFENAC SODIUM 75 MG/1
75 TABLET, DELAYED RELEASE ORAL
Qty: 60 | Refills: 1 | Status: ACTIVE | COMMUNITY
Start: 2022-11-09 | End: 1900-01-01

## 2022-11-09 NOTE — DISCUSSION/SUMMARY
[de-identified] : 4 months post-op laminectomy doing well.\par Doing well.\par Playing tennis.\par Discussed all options. \par Diclofenac PRN.\par PT\par Going to [place] until May.\par All options discussed and patient involved in decision making process including rest, medicine, home exercise, acupuncture, Chiropractic care, Physical Therapy, Pain management, and last resort surgery. All questions were answered, all alternatives discussed and the patient is in complete agreement with that plan. Follow-up appointment as instructed. Any issues and the patient will call or come in sooner.

## 2022-11-09 NOTE — HISTORY OF PRESENT ILLNESS
[Improving] : improving [de-identified] : 77 year old male presents for follow up evaluation of low back pain\par S/P L4, L5, and S1 Laminectomy and Removal of Extradural Mass 7/12/22.\par He complains of persistent right lower back pain radiating to the right buttock.\par He did not have much relief from SI injection 8/15/22. \par Also complains of cramping in both legs. \par He takes Tylenol on rare occasion for pain. \par Currently taking care of his wife who is s/p TKR with Dr. Mcclain. \par S/P melanoma surgery of his R shoulder\par Playing tennis.\par No fever chills sweats nausea vomiting no bowel or bladder dysfunction, no recent weight loss or gain no night pain. This history is in addition to the intake form that I personally reviewed. \par

## 2022-11-09 NOTE — PHYSICAL EXAM
[Normal] : Gait: normal [Painter's Sign] : negative Painter's sign [Pronator Drift] : negative pronator drift [SLR] : negative straight leg raise [de-identified] : 5 out of 5 motor strength, sensation is intact and symmetrical full range of motion flexion extension and rotation, no palpatory tenderness full range of motion of hips knees shoulders and elbows (all four extremities), no atrophy, negative straight leg raise, no pathological reflexes, no swelling, normal ambulation, no apparent distress skin is intact, no palpable lymph nodes, no upper or lower extremity instability, alert and oriented x3 and normal mood. Normal finger-to nose test.

## 2022-11-15 PROCEDURE — 90834 PSYTX W PT 45 MINUTES: CPT | Mod: 95

## 2022-11-23 PROCEDURE — 90834 PSYTX W PT 45 MINUTES: CPT | Mod: 95

## 2022-11-30 PROCEDURE — 90834 PSYTX W PT 45 MINUTES: CPT | Mod: 95

## 2022-12-08 PROCEDURE — 99214 OFFICE O/P EST MOD 30 MIN: CPT | Mod: 95

## 2022-12-20 NOTE — CONSULT NOTE ADULT - PROBLEM/RECOMMENDATION-2
Pt comes to ED with c/o fever sore throat and emesis. PT states her symptoms started two days ago. PT states she was around her friend who tested positive for flu A. Pt states she threw up today as well. DISPLAY PLAN FREE TEXT

## 2023-01-02 NOTE — HISTORY OF PRESENT ILLNESS
[FreeTextEntry1] : Patient presents today for follow-up visit.  Status post excision of melanoma of the right shoulder and local tissue rearrangement.

## 2023-01-02 NOTE — REASON FOR VISIT
[Post Op: _________] : a [unfilled] post op visit [Spouse] : spouse [FreeTextEntry1] : s/p excision of right shoulder melanoma; local tissue rearrangement DOS:09/14/2022.

## 2023-02-03 RX ORDER — DICLOFENAC SODIUM 75 MG/1
75 TABLET, DELAYED RELEASE ORAL
Qty: 30 | Refills: 0 | Status: ACTIVE | COMMUNITY
Start: 2023-02-03 | End: 1900-01-01

## 2023-02-13 ENCOUNTER — APPOINTMENT (OUTPATIENT)
Dept: ORTHOPEDIC SURGERY | Facility: CLINIC | Age: 78
End: 2023-02-13
Payer: MEDICARE

## 2023-02-13 VITALS — WEIGHT: 172 LBS | BODY MASS INDEX: 24.08 KG/M2 | HEIGHT: 71 IN

## 2023-02-13 DIAGNOSIS — M60.9 MYOSITIS, UNSPECIFIED: ICD-10-CM

## 2023-02-13 DIAGNOSIS — M79.10 MYALGIA, UNSPECIFIED SITE: ICD-10-CM

## 2023-02-13 PROCEDURE — 20552 NJX 1/MLT TRIGGER POINT 1/2: CPT

## 2023-02-13 PROCEDURE — 99214 OFFICE O/P EST MOD 30 MIN: CPT | Mod: 25

## 2023-02-13 NOTE — DISCUSSION/SUMMARY
[de-identified] : S/P laminectomy doing well.\par Doing well.\par Playing tennis.\par Right sacroiliitis.\par Discussed all options. \par Diclofenac PRN.\par I offered an injection after all risks were explained including allergic reaction to an infection under sterile conditions 1 mg of Depo-Medrol and 2 cc of 1% Lidocaine without epinephrine was injected into the painful site. The patient tolerated the procedure well and received significant relief following the injection. \par All options discussed and patient involved in decision making process including rest, medicine, home exercise, acupuncture, Chiropractic care, Physical Therapy, Pain management, and last resort surgery. All questions were answered, all alternatives discussed and the patient is in complete agreement with that plan. Follow-up appointment as instructed. Any issues and the patient will call or come in sooner.

## 2023-02-13 NOTE — END OF VISIT
DISPLAY PLAN FREE TEXT [Time Spent: ___ minutes] : I have spent [unfilled] minutes of time on the encounter.

## 2023-02-13 NOTE — PHYSICAL EXAM
[Normal] : Gait: normal [Painter's Sign] : negative Painter's sign [Pronator Drift] : negative pronator drift [SLR] : negative straight leg raise [de-identified] : 5 out of 5 motor strength, sensation is intact and symmetrical full range of motion flexion extension and rotation, no palpatory tenderness full range of motion of hips knees shoulders and elbows (all four extremities), no atrophy, negative straight leg raise, no pathological reflexes, no swelling, normal ambulation, no apparent distress skin is intact, no palpable lymph nodes, no upper or lower extremity instability, alert and oriented x3 and normal mood. Normal finger-to nose test. \par Right SI joint pain.

## 2023-02-13 NOTE — ADDENDUM
[FreeTextEntry1] : This note was written by Shailesh Coello on 02/13/2023 acting as scribe for Dr. Perfecto Rodriguez M.D.\par \par I, Perfecto Rodriguez MD, have read and attest that all the information, medical decision making and discharge instructions within are true and accurate.

## 2023-02-13 NOTE — HISTORY OF PRESENT ILLNESS
[Improving] : improving [de-identified] : 77 year old male presents for follow up evaluation of low back pain\par S/P L4, L5, and S1 Laminectomy and Removal of Extradural Mass 7/12/22.\par Went to [place], got injection and tried PT with no relief.\par He complains of persistent right lower back pain radiating to the right buttock.\par He did not have much relief from SI injection 8/15/22. \par Also complains of cramping in both legs. \par He takes Tylenol on rare occasion for pain. \par Currently taking care of his wife who is s/p TKR with Dr. Mcclain. \par S/P melanoma surgery of his R shoulder\par Playing tennis.\par No fever chills sweats nausea vomiting no bowel or bladder dysfunction, no recent weight loss or gain no night pain. This history is in addition to the intake form that I personally reviewed. \par

## 2023-02-13 NOTE — HISTORY OF PRESENT ILLNESS
[Improving] : improving [de-identified] : 77 year old male presents for follow up evaluation of low back pain\par S/P L4, L5, and S1 Laminectomy and Removal of Extradural Mass 7/12/22.\par Went to [place], got injection and tried PT with no relief.\par He complains of persistent right lower back pain radiating to the right buttock.\par He did not have much relief from SI injection 8/15/22. \par Also complains of cramping in both legs. \par He takes Tylenol on rare occasion for pain. \par Currently taking care of his wife who is s/p TKR with Dr. Mcclain. \par S/P melanoma surgery of his R shoulder\par Playing tennis.\par No fever chills sweats nausea vomiting no bowel or bladder dysfunction, no recent weight loss or gain no night pain. This history is in addition to the intake form that I personally reviewed. \par

## 2023-02-13 NOTE — PHYSICAL EXAM
[Normal] : Gait: normal [Painter's Sign] : negative Painter's sign [Pronator Drift] : negative pronator drift [SLR] : negative straight leg raise [de-identified] : 5 out of 5 motor strength, sensation is intact and symmetrical full range of motion flexion extension and rotation, no palpatory tenderness full range of motion of hips knees shoulders and elbows (all four extremities), no atrophy, negative straight leg raise, no pathological reflexes, no swelling, normal ambulation, no apparent distress skin is intact, no palpable lymph nodes, no upper or lower extremity instability, alert and oriented x3 and normal mood. Normal finger-to nose test. \par Right SI joint pain.

## 2023-02-13 NOTE — DISCUSSION/SUMMARY
[de-identified] : S/P laminectomy doing well.\par Doing well.\par Playing tennis.\par Right sacroiliitis.\par Discussed all options. \par Diclofenac PRN.\par I offered an injection after all risks were explained including allergic reaction to an infection under sterile conditions 1 mg of Depo-Medrol and 2 cc of 1% Lidocaine without epinephrine was injected into the painful site. The patient tolerated the procedure well and received significant relief following the injection. \par All options discussed and patient involved in decision making process including rest, medicine, home exercise, acupuncture, Chiropractic care, Physical Therapy, Pain management, and last resort surgery. All questions were answered, all alternatives discussed and the patient is in complete agreement with that plan. Follow-up appointment as instructed. Any issues and the patient will call or come in sooner.

## 2023-03-14 PROCEDURE — 99214 OFFICE O/P EST MOD 30 MIN: CPT | Mod: 95

## 2023-03-16 ENCOUNTER — APPOINTMENT (OUTPATIENT)
Dept: MRI IMAGING | Facility: CLINIC | Age: 78
End: 2023-03-16
Payer: MEDICARE

## 2023-03-16 ENCOUNTER — OUTPATIENT (OUTPATIENT)
Dept: OUTPATIENT SERVICES | Facility: HOSPITAL | Age: 78
LOS: 1 days | End: 2023-03-16
Payer: MEDICARE

## 2023-03-16 DIAGNOSIS — Z98.890 OTHER SPECIFIED POSTPROCEDURAL STATES: Chronic | ICD-10-CM

## 2023-03-16 DIAGNOSIS — M51.36 OTHER INTERVERTEBRAL DISC DEGENERATION, LUMBAR REGION: ICD-10-CM

## 2023-03-16 DIAGNOSIS — Z90.5 ACQUIRED ABSENCE OF KIDNEY: Chronic | ICD-10-CM

## 2023-03-16 PROCEDURE — 72195 MRI PELVIS W/O DYE: CPT | Mod: 26,MH

## 2023-03-16 PROCEDURE — 72195 MRI PELVIS W/O DYE: CPT

## 2023-03-17 ENCOUNTER — APPOINTMENT (OUTPATIENT)
Dept: ORTHOPEDIC SURGERY | Facility: CLINIC | Age: 78
End: 2023-03-17
Payer: MEDICARE

## 2023-03-17 PROCEDURE — 99214 OFFICE O/P EST MOD 30 MIN: CPT | Mod: 95

## 2023-03-20 ENCOUNTER — APPOINTMENT (OUTPATIENT)
Dept: ORTHOPEDIC SURGERY | Facility: CLINIC | Age: 78
End: 2023-03-20

## 2023-03-22 PROCEDURE — 90834 PSYTX W PT 45 MINUTES: CPT | Mod: 95

## 2023-03-23 ENCOUNTER — APPOINTMENT (OUTPATIENT)
Dept: ORTHOPEDIC SURGERY | Facility: CLINIC | Age: 78
End: 2023-03-23
Payer: MEDICARE

## 2023-03-23 PROCEDURE — 99215 OFFICE O/P EST HI 40 MIN: CPT

## 2023-03-23 PROCEDURE — 73502 X-RAY EXAM HIP UNI 2-3 VIEWS: CPT

## 2023-03-24 NOTE — HISTORY OF PRESENT ILLNESS
----- Message from Dorinda Montalvo MD sent at 10/1/2019  7:24 AM EDT -----  Please tell the patient that her kidney numbers are essentially at baseline which is good
I spoke to patient she is aware that labs are stable 
[de-identified] : This is very nice 77-year-old gentleman experiencing right hip and groin and thigh pain, which is severe in intensity. The pain substantially limits activities of daily living. Walking tolerance is reduced. Medication and activity modification have been minimally effective for a period lasting greater than three months in duration. Assistive devices and external support were not deemed by the patient to be helpful in improving their function. Due to the severity of osteoarthritis and level of pain, physical therapy is contraindicated. Pain and restriction of function are intolerable at this time. The patient denies any radiation of the pain to the feet and it is not associated with numbness, tingling, or weakness.

## 2023-03-24 NOTE — DISCUSSION/SUMMARY
[de-identified] : The patient has severe right hip osteoarthritis.  He has failed a course of conservative management and would like to proceed with a direct anterior approach right total of arthroplasty.\par \par The patient is an appropriate candidate for consideration of right total hip replacement. An extensive discussion was conducted of the natural history of the disease and the variety of surgical and non-surgical treatment options available to the patient. A risk/benefit analysis was discussed with the patient reviewing the advantages and disadvantages of surgical intervention at this time. Both the level and length of the patient's pain have made additional conservative treatment measures consisting of NSAIDs, physical therapy, and/or corticosteroids contraindicated. A full explanation was given of the nature and the purpose of the procedure and anesthesia, its benefits, possible alternative methods of diagnosis or treatment, the risks involved, the possibility of complications, the foreseeable consequences of the procedure and the possible results of the non-treatment. I reviewed the plan of care as well as used a model of a total hip implant equivalent to the one that will be used for their total hip joint replacement. The ability to secure the implant utilizing cement or cementless (press-fit) was discussed with the patient. The patient agrees with the plan of care, as well as the use of implants for their total hip replacement. \par \par No guarantee or assurance was made as to the results that may be obtained. Specifically, the risks were identified to include, but are not limited to the following: Infection, phlebitis, pulmonary embolism, death, paralysis, dislocation, pain, stiffness, instability, limp, weakness, breakage, leg-length inequality, uncontrolled bleeding, nerve injury, blood vessel injury, pressure sores, anesthetic risks, delayed healing of wound and bone, and wear and loosening. Further discussion was undertaken with the patient about the details of surgical preparation, treatment, and postoperative rehabilitation including medical clearance, autotransfusion, the hospital course, and the postoperative rehabilitation involved. All in all, I feel that this patient is a good candidate for surgical reconstruction.\par \par The patient and I discussed the current SARS-CoV-2 (COVID-19) pandemic which has affected our local hospitals. We discussed that our hospitals treat patients with COVID-19. All efforts will be made to avoid cohorting the patient with diagnosed or suspected COVID-19 patient. They also understand that we will screen them 24-48 hours prior to surgery. Despite our best efforts, there is a potential risk for iatrogenic transmission of COVID-19 to the patient during the perioperative period. Farrah COVID-19 during the perioperative period may increase the patient´s risks of an adverse outcome including postoperative pneumonia, difficulty breathing, requirement for a breathing tube (general endotracheal intubation), and death. The patient is understanding of this risk, and is willing to proceed with surgery at this time.

## 2023-03-24 NOTE — PHYSICAL EXAM
[de-identified] : Patient is well nourished, well-developed, in no acute distress, with appropriate mood and affect. The patient is oriented to time, place, and person. Respirations are even and unlabored. Gait evaluation reveals a limp. There is no inguinal adenopathy. Examination of the contralateral hip shows normal range of motion, strength, no tenderness, and intact skin. The affected limb is well-perfused, shows a grossly normal motor and sensory examination. Examination of the hip shows no skin lesions. Hip motion is reduced and causes pain. FADIR is positive and LILLIE is positive. Stinchfield test is positive. Leg lengths are approximately equal. Both hips are stable and muscle strength is normal. Pedal pulses are palpable. [de-identified] : AP and lateral x-rays of the right hip, pelvis, and femur were ordered and taken in the office and demonstrate degenerative joint disease of the hip with joint space narrowing, osteophyte formation, and subchondral sclerosis.\par \par The patient brings with him an MRI of the right hip.  I reviewed the MR imaging with the patient was demonstrates severe right hip osteoarthritis.

## 2023-03-28 ENCOUNTER — APPOINTMENT (OUTPATIENT)
Dept: SURGICAL ONCOLOGY | Facility: CLINIC | Age: 78
End: 2023-03-28
Payer: MEDICARE

## 2023-03-28 VITALS
HEIGHT: 71 IN | SYSTOLIC BLOOD PRESSURE: 124 MMHG | BODY MASS INDEX: 24.22 KG/M2 | WEIGHT: 173 LBS | DIASTOLIC BLOOD PRESSURE: 82 MMHG | OXYGEN SATURATION: 97 % | RESPIRATION RATE: 16 BRPM | HEART RATE: 99 BPM

## 2023-03-28 PROCEDURE — 99213 OFFICE O/P EST LOW 20 MIN: CPT

## 2023-03-29 PROCEDURE — 90834 PSYTX W PT 45 MINUTES: CPT | Mod: 95

## 2023-03-29 NOTE — HISTORY OF PRESENT ILLNESS
[de-identified] : Jaquan is a 71 year old male presents to office for 3 month follow up visit. 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 Final pathology revealed a [id number], 0.31 mm in thickness, negative margins, no residual neoplasm. Today he is without any complaints. Denies new lesions or masses. Denies bleeding or pruritic moles. Denies pain or constitutional symptoms. He continues ongoing dermatologic surveillance with Dr. Antione Vazquez and reports exam last Tuesday was unremarkable. Continues to avoid sun and applies sunblock. \par \par Chest Xray 7/13/16: no evidence of disease. \par ---------------------------------------------------------------------------------------------------\par 04/17/2018: 71 y/o male with history of 0.31 mm thick melanoma excised from the right shoulder three years ago presents for follow up.  Currently distressed as he had biopsy of cutaneous lesion adjacent to scar of right shoulder revealing an at least 0.8 mm thick melanoma.  He has been referred back to us by his dermatologist for surgical treatment.\par Patient denies pain at biopsy site.  He states he had another biopsy of the right back which revealed basal cell cancer to be treated by Dr. Vazquez.\par \par 05/22/2018:  Patient is s/p wide excision of right shoulder melanoma and right axillary sentinel lymph node biopsy 05/09/2018.  Recovering well.\par Pathology: 0.8 mm melanoma, margins negative.  Three right axillary sentinel lymph nodes negative.\par \par 08/21/2018:  Patient presents for 6 months follow up.  Feels well.  Denies new cutaneous malignancies.  He continues to follow up with dermatology.  He denies pain, swelling or new nodularity at right shoulder excision site and right arm/axilla.\par \par 08/30/2022: Patient recently underwent biopsy of a right shoulder cutaneous lesion 08/22/2022.  This is in the region of the previously resected right shoulder melanoma 05/2018.\par Pathology: fragments of malignant melanoma for which metastatic melanoma versus 0.6 mm thick melanoma could not be differentiated on biopsy specimen.\par \par 09/27/2022: Patient is s/p excision of right shoulder melanoma 09/14/2022.  He is recovering well and denies pain.\par Pathology has not resulted\par \par 03/28/2023: Patient reports doing well and denies pain at operative site of right shoulder.  He has had dermatology visits and has no new malignant biopsies.

## 2023-03-29 NOTE — ASSESSMENT
[FreeTextEntry1] : Doing well.\par No clinical evidence of recurrence.\par Continue with dermatology.\par Follow up exam in one year.

## 2023-03-29 NOTE — PHYSICAL EXAM
[FreeTextEntry1] : Right shoulder incision healing well.  No palpable axillary/cervical adenopathy. [Normal] : supple, no neck mass and thyroid not enlarged [Normal Neck Lymph Nodes] : normal neck lymph nodes  [Normal Supraclavicular Lymph Nodes] : normal supraclavicular lymph nodes [Normal Groin Lymph Nodes] : normal groin lymph nodes [Normal Axillary Lymph Nodes] : normal axillary lymph nodes [Normal] : oriented to person, place and time, with appropriate affect

## 2023-04-05 PROCEDURE — 99214 OFFICE O/P EST MOD 30 MIN: CPT

## 2023-05-18 ENCOUNTER — OUTPATIENT (OUTPATIENT)
Dept: OUTPATIENT SERVICES | Facility: HOSPITAL | Age: 78
LOS: 1 days | End: 2023-05-18
Payer: MEDICARE

## 2023-05-18 VITALS
TEMPERATURE: 98 F | HEART RATE: 75 BPM | WEIGHT: 171.96 LBS | HEIGHT: 70.5 IN | SYSTOLIC BLOOD PRESSURE: 118 MMHG | RESPIRATION RATE: 16 BRPM | DIASTOLIC BLOOD PRESSURE: 73 MMHG | OXYGEN SATURATION: 97 %

## 2023-05-18 DIAGNOSIS — M16.11 UNILATERAL PRIMARY OSTEOARTHRITIS, RIGHT HIP: ICD-10-CM

## 2023-05-18 DIAGNOSIS — Z98.890 OTHER SPECIFIED POSTPROCEDURAL STATES: Chronic | ICD-10-CM

## 2023-05-18 DIAGNOSIS — I10 ESSENTIAL (PRIMARY) HYPERTENSION: ICD-10-CM

## 2023-05-18 DIAGNOSIS — Z01.818 ENCOUNTER FOR OTHER PREPROCEDURAL EXAMINATION: ICD-10-CM

## 2023-05-18 DIAGNOSIS — Z90.5 ACQUIRED ABSENCE OF KIDNEY: Chronic | ICD-10-CM

## 2023-05-18 LAB
A1C WITH ESTIMATED AVERAGE GLUCOSE RESULT: 5.6 % — SIGNIFICANT CHANGE UP (ref 4–5.6)
ANION GAP SERPL CALC-SCNC: 10 MMOL/L — SIGNIFICANT CHANGE UP (ref 5–17)
BLD GP AB SCN SERPL QL: NEGATIVE — SIGNIFICANT CHANGE UP
BUN SERPL-MCNC: 26 MG/DL — HIGH (ref 7–23)
CALCIUM SERPL-MCNC: 9 MG/DL — SIGNIFICANT CHANGE UP (ref 8.4–10.5)
CHLORIDE SERPL-SCNC: 102 MMOL/L — SIGNIFICANT CHANGE UP (ref 96–108)
CO2 SERPL-SCNC: 26 MMOL/L — SIGNIFICANT CHANGE UP (ref 22–31)
CREAT SERPL-MCNC: 0.9 MG/DL — SIGNIFICANT CHANGE UP (ref 0.5–1.3)
EGFR: 87 ML/MIN/1.73M2 — SIGNIFICANT CHANGE UP
ESTIMATED AVERAGE GLUCOSE: 114 MG/DL — SIGNIFICANT CHANGE UP (ref 68–114)
GLUCOSE SERPL-MCNC: 94 MG/DL — SIGNIFICANT CHANGE UP (ref 70–99)
HCT VFR BLD CALC: 40.6 % — SIGNIFICANT CHANGE UP (ref 39–50)
HGB BLD-MCNC: 13.5 G/DL — SIGNIFICANT CHANGE UP (ref 13–17)
MCHC RBC-ENTMCNC: 30.9 PG — SIGNIFICANT CHANGE UP (ref 27–34)
MCHC RBC-ENTMCNC: 33.3 GM/DL — SIGNIFICANT CHANGE UP (ref 32–36)
MCV RBC AUTO: 92.9 FL — SIGNIFICANT CHANGE UP (ref 80–100)
MRSA PCR RESULT.: SIGNIFICANT CHANGE UP
NRBC # BLD: 0 /100 WBCS — SIGNIFICANT CHANGE UP (ref 0–0)
PLATELET # BLD AUTO: 238 K/UL — SIGNIFICANT CHANGE UP (ref 150–400)
POTASSIUM SERPL-MCNC: 3.9 MMOL/L — SIGNIFICANT CHANGE UP (ref 3.5–5.3)
POTASSIUM SERPL-SCNC: 3.9 MMOL/L — SIGNIFICANT CHANGE UP (ref 3.5–5.3)
RBC # BLD: 4.37 M/UL — SIGNIFICANT CHANGE UP (ref 4.2–5.8)
RBC # FLD: 14.4 % — SIGNIFICANT CHANGE UP (ref 10.3–14.5)
RH IG SCN BLD-IMP: POSITIVE — SIGNIFICANT CHANGE UP
S AUREUS DNA NOSE QL NAA+PROBE: SIGNIFICANT CHANGE UP
SODIUM SERPL-SCNC: 138 MMOL/L — SIGNIFICANT CHANGE UP (ref 135–145)
WBC # BLD: 6.08 K/UL — SIGNIFICANT CHANGE UP (ref 3.8–10.5)
WBC # FLD AUTO: 6.08 K/UL — SIGNIFICANT CHANGE UP (ref 3.8–10.5)

## 2023-05-18 PROCEDURE — 87641 MR-STAPH DNA AMP PROBE: CPT

## 2023-05-18 PROCEDURE — 86900 BLOOD TYPING SEROLOGIC ABO: CPT

## 2023-05-18 PROCEDURE — 87640 STAPH A DNA AMP PROBE: CPT

## 2023-05-18 PROCEDURE — 86901 BLOOD TYPING SEROLOGIC RH(D): CPT

## 2023-05-18 PROCEDURE — 80048 BASIC METABOLIC PNL TOTAL CA: CPT

## 2023-05-18 PROCEDURE — G0463: CPT

## 2023-05-18 PROCEDURE — 86850 RBC ANTIBODY SCREEN: CPT

## 2023-05-18 PROCEDURE — 83036 HEMOGLOBIN GLYCOSYLATED A1C: CPT

## 2023-05-18 PROCEDURE — 85027 COMPLETE CBC AUTOMATED: CPT

## 2023-05-18 RX ORDER — LIDOCAINE HCL 20 MG/ML
0.2 VIAL (ML) INJECTION ONCE
Refills: 0 | Status: DISCONTINUED | OUTPATIENT
Start: 2023-05-31 | End: 2023-05-31

## 2023-05-18 RX ORDER — CHLORHEXIDINE GLUCONATE 213 G/1000ML
1 SOLUTION TOPICAL ONCE
Refills: 0 | Status: DISCONTINUED | OUTPATIENT
Start: 2023-05-31 | End: 2023-05-31

## 2023-05-18 RX ORDER — CEFAZOLIN SODIUM 1 G
2000 VIAL (EA) INJECTION ONCE
Refills: 0 | Status: COMPLETED | OUTPATIENT
Start: 2023-05-31 | End: 2023-05-31

## 2023-05-18 RX ORDER — SODIUM CHLORIDE 9 MG/ML
3 INJECTION INTRAMUSCULAR; INTRAVENOUS; SUBCUTANEOUS EVERY 8 HOURS
Refills: 0 | Status: DISCONTINUED | OUTPATIENT
Start: 2023-05-31 | End: 2023-05-31

## 2023-05-18 RX ORDER — TRAMADOL HYDROCHLORIDE 50 MG/1
50 TABLET ORAL ONCE
Refills: 0 | Status: DISCONTINUED | OUTPATIENT
Start: 2023-05-31 | End: 2023-05-31

## 2023-05-18 RX ORDER — PANTOPRAZOLE SODIUM 20 MG/1
40 TABLET, DELAYED RELEASE ORAL ONCE
Refills: 0 | Status: COMPLETED | OUTPATIENT
Start: 2023-05-31 | End: 2023-05-31

## 2023-05-18 RX ORDER — ZOLPIDEM TARTRATE 10 MG/1
1 TABLET ORAL
Qty: 0 | Refills: 0 | DISCHARGE

## 2023-05-18 NOTE — H&P PST ADULT - PRIMARY CARE PROVIDER
Veronica Bey [phone] Veronica Bey [phone]             Luis Felipe Camarillo (Cards) [phone]

## 2023-05-18 NOTE — H&P PST ADULT - PROBLEM SELECTOR PLAN 1
Scheduled for Right total hip arthroplasty.    Preoperative instructions and chlorhexidine given to patient.  Labs: cbc, bmp, T/S, MRSA/MSSA, AIC drawn in PST    Instructed to stop all vitamin 1 week prior to surgery.

## 2023-05-18 NOTE — H&P PST ADULT - HISTORY OF PRESENT ILLNESS
Denies Covid Infection.  Vaccinated and boosted with Pfizer vaccine.  This is a 77 year old male with HTN, HLD, right hip osteoarthritis. Presenting to [facility] for scheduled Right total hip arthoplasty by Dr. Mcclain on 5/31/23.  Patient reports right hip pain radiating to groin and thigh. Pain is sharp, P/S 4/10. Pain limits ADLs including walking. He plays golf and tennis a few times a week. Per patient the goal of surgery to reduce pain and improve quality of life.     Denies Fever, chills, SOB, chestpain, palpitations, nausea, vomiting, or sick contact.   Denies any pain to feet, numbness, tingling, or weakness. Denies any falls.    Denies use of assistive devices.  Denies recent steroid injections or physical therapy.         Denies Hx Covid Infection.  Vaccinated and boosted with Pfizer vaccine.

## 2023-05-18 NOTE — H&P PST ADULT - NSICDXPASTSURGICALHX_GEN_ALL_CORE_FT
PAST SURGICAL HISTORY:  H/O left nephrectomy Partial    H/O melanoma excision right shoulder in 2015    H/O needle biopsy of prostate    S/P laminectomy lumbar 8/2022    S/P wrist surgery left wrist, 30 years ago with hardware placement and excision of hardware months later

## 2023-05-18 NOTE — H&P PST ADULT - MUSCULOSKELETAL
details… normal/ROM intact/no joint swelling/normal gait/strength 5/5 bilateral upper extremities/strength 5/5 bilateral lower extremities

## 2023-05-18 NOTE — H&P PST ADULT - NSICDXPASTMEDICALHX_GEN_ALL_CORE_FT
PAST MEDICAL HISTORY:  Former smoker     HLD (hyperlipidemia)     HTN (hypertension)     Malignant melanoma of skin, unspecified     Radiculopathy, lumbar region     Renal cancer, left     Renal cyst     Situational anxiety     Spondylolisthesis, lumbar region

## 2023-05-18 NOTE — H&P PST ADULT - ASSESSMENT
Home
DASI score:8  DASI activity: Performs all ADL's independently Active plays golf and tennis.   Loose teeth or denture: denies

## 2023-05-19 ENCOUNTER — APPOINTMENT (OUTPATIENT)
Dept: UROLOGY | Facility: CLINIC | Age: 78
End: 2023-05-19
Payer: MEDICARE

## 2023-05-19 PROCEDURE — 99214 OFFICE O/P EST MOD 30 MIN: CPT

## 2023-05-19 NOTE — ASSESSMENT
[FreeTextEntry1] : Status post partial nephrectomy in 2020 for T1a  FG2. Renal ultrasound done today No evidence of recurrence . He is doing well No urological complaints. \par

## 2023-05-19 NOTE — HISTORY OF PRESENT ILLNESS
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 He is doing well . He sees Dr Ruelas  for general urology. \par He had a cyst on spine which was removed surgically last July. He is going for hip replacement. \par

## 2023-05-22 PROCEDURE — 99214 OFFICE O/P EST MOD 30 MIN: CPT

## 2023-05-30 ENCOUNTER — TRANSCRIPTION ENCOUNTER (OUTPATIENT)
Age: 78
End: 2023-05-30

## 2023-05-31 ENCOUNTER — TRANSCRIPTION ENCOUNTER (OUTPATIENT)
Age: 78
End: 2023-05-31

## 2023-05-31 ENCOUNTER — APPOINTMENT (OUTPATIENT)
Dept: ORTHOPEDIC SURGERY | Facility: HOSPITAL | Age: 78
End: 2023-05-31

## 2023-05-31 ENCOUNTER — OUTPATIENT (OUTPATIENT)
Dept: INPATIENT UNIT | Facility: HOSPITAL | Age: 78
LOS: 1 days | End: 2023-05-31
Payer: MEDICARE

## 2023-05-31 VITALS
RESPIRATION RATE: 18 BRPM | SYSTOLIC BLOOD PRESSURE: 121 MMHG | HEIGHT: 70.51 IN | WEIGHT: 171.96 LBS | DIASTOLIC BLOOD PRESSURE: 76 MMHG | TEMPERATURE: 99 F | HEART RATE: 58 BPM

## 2023-05-31 DIAGNOSIS — M16.11 UNILATERAL PRIMARY OSTEOARTHRITIS, RIGHT HIP: ICD-10-CM

## 2023-05-31 DIAGNOSIS — Z90.5 ACQUIRED ABSENCE OF KIDNEY: Chronic | ICD-10-CM

## 2023-05-31 DIAGNOSIS — Z98.890 OTHER SPECIFIED POSTPROCEDURAL STATES: Chronic | ICD-10-CM

## 2023-05-31 PROCEDURE — 72170 X-RAY EXAM OF PELVIS: CPT | Mod: 26

## 2023-05-31 DEVICE — HEAD FEM CER V40 36MM  PLUS 0MM: Type: IMPLANTABLE DEVICE | Status: FUNCTIONAL

## 2023-05-31 DEVICE — LINER ACET TRIDENT X3 0 DEG 36MM F: Type: IMPLANTABLE DEVICE | Status: FUNCTIONAL

## 2023-05-31 DEVICE — STEM FEM INSIGNIA COLLARED HIGH SZ 6 38.5X107MM: Type: IMPLANTABLE DEVICE | Status: FUNCTIONAL

## 2023-05-31 DEVICE — SHELL ACET TRIDENT II F 56MM: Type: IMPLANTABLE DEVICE | Status: FUNCTIONAL

## 2023-05-31 RX ORDER — ACETAMINOPHEN 500 MG
1000 TABLET ORAL ONCE
Refills: 0 | Status: COMPLETED | OUTPATIENT
Start: 2023-06-01 | End: 2023-06-01

## 2023-05-31 RX ORDER — SODIUM CHLORIDE 9 MG/ML
500 INJECTION, SOLUTION INTRAVENOUS ONCE
Refills: 0 | Status: COMPLETED | OUTPATIENT
Start: 2023-05-31 | End: 2023-05-31

## 2023-05-31 RX ORDER — METOPROLOL TARTRATE 50 MG
50 TABLET ORAL DAILY
Refills: 0 | Status: DISCONTINUED | OUTPATIENT
Start: 2023-05-31 | End: 2023-05-31

## 2023-05-31 RX ORDER — LOSARTAN POTASSIUM 100 MG/1
25 TABLET, FILM COATED ORAL DAILY
Refills: 0 | Status: DISCONTINUED | OUTPATIENT
Start: 2023-05-31 | End: 2023-06-01

## 2023-05-31 RX ORDER — APIXABAN 2.5 MG/1
2.5 TABLET, FILM COATED ORAL EVERY 12 HOURS
Refills: 0 | Status: DISCONTINUED | OUTPATIENT
Start: 2023-06-01 | End: 2023-06-01

## 2023-05-31 RX ORDER — ACETAMINOPHEN 500 MG
1000 TABLET ORAL ONCE
Refills: 0 | Status: COMPLETED | OUTPATIENT
Start: 2023-05-31 | End: 2023-05-31

## 2023-05-31 RX ORDER — ASPIRIN/CALCIUM CARB/MAGNESIUM 324 MG
81 TABLET ORAL
Refills: 0 | Status: DISCONTINUED | OUTPATIENT
Start: 2023-05-31 | End: 2023-05-31

## 2023-05-31 RX ORDER — MIRTAZAPINE 45 MG/1
45 TABLET, ORALLY DISINTEGRATING ORAL AT BEDTIME
Refills: 0 | Status: DISCONTINUED | OUTPATIENT
Start: 2023-05-31 | End: 2023-06-01

## 2023-05-31 RX ORDER — SENNA PLUS 8.6 MG/1
2 TABLET ORAL AT BEDTIME
Refills: 0 | Status: DISCONTINUED | OUTPATIENT
Start: 2023-05-31 | End: 2023-06-01

## 2023-05-31 RX ORDER — MAGNESIUM HYDROXIDE 400 MG/1
30 TABLET, CHEWABLE ORAL DAILY
Refills: 0 | Status: DISCONTINUED | OUTPATIENT
Start: 2023-05-31 | End: 2023-06-01

## 2023-05-31 RX ORDER — AMLODIPINE BESYLATE 2.5 MG/1
5 TABLET ORAL DAILY
Refills: 0 | Status: DISCONTINUED | OUTPATIENT
Start: 2023-05-31 | End: 2023-05-31

## 2023-05-31 RX ORDER — ATORVASTATIN CALCIUM 80 MG/1
20 TABLET, FILM COATED ORAL AT BEDTIME
Refills: 0 | Status: DISCONTINUED | OUTPATIENT
Start: 2023-05-31 | End: 2023-06-01

## 2023-05-31 RX ORDER — TRAMADOL HYDROCHLORIDE 50 MG/1
50 TABLET ORAL EVERY 6 HOURS
Refills: 0 | Status: DISCONTINUED | OUTPATIENT
Start: 2023-05-31 | End: 2023-06-01

## 2023-05-31 RX ORDER — ZOLPIDEM TARTRATE 10 MG/1
0.5 TABLET ORAL
Refills: 0 | DISCHARGE

## 2023-05-31 RX ORDER — MULTIVIT-MIN/FERROUS GLUCONATE 9 MG/15 ML
1 LIQUID (ML) ORAL
Qty: 0 | Refills: 0 | DISCHARGE

## 2023-05-31 RX ORDER — SODIUM CHLORIDE 9 MG/ML
500 INJECTION, SOLUTION INTRAVENOUS ONCE
Refills: 0 | Status: COMPLETED | OUTPATIENT
Start: 2023-06-01 | End: 2023-06-01

## 2023-05-31 RX ORDER — CLONAZEPAM 1 MG
1 TABLET ORAL
Qty: 0 | Refills: 0 | DISCHARGE

## 2023-05-31 RX ORDER — DEXAMETHASONE 0.5 MG/5ML
8 ELIXIR ORAL ONCE
Refills: 0 | Status: COMPLETED | OUTPATIENT
Start: 2023-06-01 | End: 2023-06-01

## 2023-05-31 RX ORDER — LOSARTAN POTASSIUM 100 MG/1
25 TABLET, FILM COATED ORAL DAILY
Refills: 0 | Status: DISCONTINUED | OUTPATIENT
Start: 2023-05-31 | End: 2023-05-31

## 2023-05-31 RX ORDER — POLYETHYLENE GLYCOL 3350 17 G/17G
17 POWDER, FOR SOLUTION ORAL AT BEDTIME
Refills: 0 | Status: DISCONTINUED | OUTPATIENT
Start: 2023-05-31 | End: 2023-06-01

## 2023-05-31 RX ORDER — CHOLECALCIFEROL (VITAMIN D3) 125 MCG
1 CAPSULE ORAL
Qty: 0 | Refills: 0 | DISCHARGE

## 2023-05-31 RX ORDER — TAMSULOSIN HYDROCHLORIDE 0.4 MG/1
0.4 CAPSULE ORAL AT BEDTIME
Refills: 0 | Status: DISCONTINUED | OUTPATIENT
Start: 2023-05-31 | End: 2023-06-01

## 2023-05-31 RX ORDER — LEVOTHYROXINE SODIUM 125 MCG
1 TABLET ORAL
Qty: 0 | Refills: 0 | DISCHARGE

## 2023-05-31 RX ORDER — CLONAZEPAM 1 MG
0.5 TABLET ORAL DAILY
Refills: 0 | Status: DISCONTINUED | OUTPATIENT
Start: 2023-05-31 | End: 2023-06-01

## 2023-05-31 RX ORDER — ONDANSETRON 8 MG/1
4 TABLET, FILM COATED ORAL EVERY 6 HOURS
Refills: 0 | Status: DISCONTINUED | OUTPATIENT
Start: 2023-05-31 | End: 2023-06-01

## 2023-05-31 RX ORDER — QUETIAPINE FUMARATE 200 MG/1
50 TABLET, FILM COATED ORAL DAILY
Refills: 0 | Status: DISCONTINUED | OUTPATIENT
Start: 2023-05-31 | End: 2023-06-01

## 2023-05-31 RX ORDER — METOPROLOL TARTRATE 50 MG
50 TABLET ORAL DAILY
Refills: 0 | Status: DISCONTINUED | OUTPATIENT
Start: 2023-05-31 | End: 2023-06-01

## 2023-05-31 RX ORDER — PANTOPRAZOLE SODIUM 20 MG/1
40 TABLET, DELAYED RELEASE ORAL
Refills: 0 | Status: DISCONTINUED | OUTPATIENT
Start: 2023-05-31 | End: 2023-06-01

## 2023-05-31 RX ORDER — SODIUM CHLORIDE 9 MG/ML
1000 INJECTION, SOLUTION INTRAVENOUS
Refills: 0 | Status: DISCONTINUED | OUTPATIENT
Start: 2023-05-31 | End: 2023-05-31

## 2023-05-31 RX ORDER — LEVOTHYROXINE SODIUM 125 MCG
50 TABLET ORAL DAILY
Refills: 0 | Status: DISCONTINUED | OUTPATIENT
Start: 2023-05-31 | End: 2023-06-01

## 2023-05-31 RX ORDER — QUETIAPINE FUMARATE 200 MG/1
1 TABLET, FILM COATED ORAL
Qty: 0 | Refills: 0 | DISCHARGE

## 2023-05-31 RX ORDER — ACETAMINOPHEN 500 MG
975 TABLET ORAL EVERY 8 HOURS
Refills: 0 | Status: DISCONTINUED | OUTPATIENT
Start: 2023-06-01 | End: 2023-06-01

## 2023-05-31 RX ORDER — ONDANSETRON 8 MG/1
4 TABLET, FILM COATED ORAL ONCE
Refills: 0 | Status: DISCONTINUED | OUTPATIENT
Start: 2023-05-31 | End: 2023-05-31

## 2023-05-31 RX ORDER — HYDROMORPHONE HYDROCHLORIDE 2 MG/ML
0.5 INJECTION INTRAMUSCULAR; INTRAVENOUS; SUBCUTANEOUS
Refills: 0 | Status: DISCONTINUED | OUTPATIENT
Start: 2023-05-31 | End: 2023-05-31

## 2023-05-31 RX ORDER — ZOLPIDEM TARTRATE 10 MG/1
5 TABLET ORAL AT BEDTIME
Refills: 0 | Status: DISCONTINUED | OUTPATIENT
Start: 2023-05-31 | End: 2023-06-01

## 2023-05-31 RX ORDER — AMLODIPINE BESYLATE 2.5 MG/1
5 TABLET ORAL DAILY
Refills: 0 | Status: DISCONTINUED | OUTPATIENT
Start: 2023-05-31 | End: 2023-06-01

## 2023-05-31 RX ORDER — CEFAZOLIN SODIUM 1 G
2000 VIAL (EA) INJECTION EVERY 8 HOURS
Refills: 0 | Status: COMPLETED | OUTPATIENT
Start: 2023-05-31 | End: 2023-05-31

## 2023-05-31 RX ORDER — SERTRALINE 25 MG/1
25 TABLET, FILM COATED ORAL DAILY
Refills: 0 | Status: DISCONTINUED | OUTPATIENT
Start: 2023-05-31 | End: 2023-06-01

## 2023-05-31 RX ORDER — OXYCODONE HYDROCHLORIDE 5 MG/1
5 TABLET ORAL EVERY 4 HOURS
Refills: 0 | Status: DISCONTINUED | OUTPATIENT
Start: 2023-05-31 | End: 2023-06-01

## 2023-05-31 RX ORDER — OXYCODONE HYDROCHLORIDE 5 MG/1
10 TABLET ORAL EVERY 4 HOURS
Refills: 0 | Status: DISCONTINUED | OUTPATIENT
Start: 2023-05-31 | End: 2023-06-01

## 2023-05-31 RX ORDER — KETOROLAC TROMETHAMINE 30 MG/ML
15 SYRINGE (ML) INJECTION EVERY 6 HOURS
Refills: 0 | Status: DISCONTINUED | OUTPATIENT
Start: 2023-05-31 | End: 2023-05-31

## 2023-05-31 RX ORDER — CEFAZOLIN SODIUM 1 G
2000 VIAL (EA) INJECTION EVERY 8 HOURS
Refills: 0 | Status: DISCONTINUED | OUTPATIENT
Start: 2023-05-31 | End: 2023-05-31

## 2023-05-31 RX ORDER — MIRTAZAPINE 45 MG/1
1 TABLET, ORALLY DISINTEGRATING ORAL
Qty: 0 | Refills: 0 | DISCHARGE

## 2023-05-31 RX ORDER — ROSUVASTATIN CALCIUM 5 MG/1
1 TABLET ORAL
Qty: 0 | Refills: 0 | DISCHARGE

## 2023-05-31 RX ORDER — HYDROMORPHONE HYDROCHLORIDE 2 MG/ML
0.5 INJECTION INTRAMUSCULAR; INTRAVENOUS; SUBCUTANEOUS ONCE
Refills: 0 | Status: DISCONTINUED | OUTPATIENT
Start: 2023-05-31 | End: 2023-06-01

## 2023-05-31 RX ADMIN — Medication 400 MILLIGRAM(S): at 22:39

## 2023-05-31 RX ADMIN — Medication 1000 MILLIGRAM(S): at 23:36

## 2023-05-31 RX ADMIN — Medication 100 MILLIGRAM(S): at 23:20

## 2023-05-31 RX ADMIN — TRAMADOL HYDROCHLORIDE 50 MILLIGRAM(S): 50 TABLET ORAL at 07:20

## 2023-05-31 RX ADMIN — SODIUM CHLORIDE 1000 MILLILITER(S): 9 INJECTION, SOLUTION INTRAVENOUS at 10:53

## 2023-05-31 RX ADMIN — Medication 100 MILLIGRAM(S): at 15:45

## 2023-05-31 RX ADMIN — QUETIAPINE FUMARATE 50 MILLIGRAM(S): 200 TABLET, FILM COATED ORAL at 22:08

## 2023-05-31 RX ADMIN — Medication 1000 MILLIGRAM(S): at 16:00

## 2023-05-31 RX ADMIN — TAMSULOSIN HYDROCHLORIDE 0.4 MILLIGRAM(S): 0.4 CAPSULE ORAL at 22:08

## 2023-05-31 RX ADMIN — MIRTAZAPINE 45 MILLIGRAM(S): 45 TABLET, ORALLY DISINTEGRATING ORAL at 22:08

## 2023-05-31 RX ADMIN — Medication 0.5 MILLIGRAM(S): at 22:07

## 2023-05-31 RX ADMIN — SODIUM CHLORIDE 1000 MILLILITER(S): 9 INJECTION, SOLUTION INTRAVENOUS at 16:46

## 2023-05-31 RX ADMIN — Medication 400 MILLIGRAM(S): at 15:45

## 2023-05-31 RX ADMIN — PANTOPRAZOLE SODIUM 40 MILLIGRAM(S): 20 TABLET, DELAYED RELEASE ORAL at 07:23

## 2023-05-31 RX ADMIN — Medication 1 TABLET(S): at 18:19

## 2023-05-31 RX ADMIN — ZOLPIDEM TARTRATE 5 MILLIGRAM(S): 10 TABLET ORAL at 22:39

## 2023-05-31 NOTE — PHYSICAL THERAPY INITIAL EVALUATION ADULT - GAIT DISTANCE, PT EVAL
limited secondary to getting dizzy becoming orthostatic./10 feet limited secondary to getting dizzy becoming orthostatic./5 feet

## 2023-05-31 NOTE — PHYSICAL THERAPY INITIAL EVALUATION ADULT - BED MOBILITY LIMITATIONS, REHAB EVAL
Return to clinic in 2 months, sooner if needed     Check labs then ( for diabetes especially )    Hold off antibiotics    If sinus symptoms not resolving, call    Wt loss is key    Healthy diet, exercise    Decrease hydrochlorothiazide to 12.5 mg daily    Low salt diet    Call to schedule with counselor/ therapist    decreased ability to use arms for pushing/pulling/decreased ability to use legs for bridging/pushing/impaired ability to control trunk for mobility

## 2023-05-31 NOTE — PRE-ANESTHESIA EVALUATION ADULT - NSANTHPEFT_GEN_ALL_CORE
lungs clear bilaterally to auscultation   s1 s2 present   no chest pain, adequate exercise tolerance

## 2023-05-31 NOTE — PHYSICAL THERAPY INITIAL EVALUATION ADULT - ADDITIONAL COMMENTS
Pt states he lives with wife on 3rd floor apartment with elevator and stair access, was using SAC for ambulation and was fully independent functionally and with ADL's.

## 2023-05-31 NOTE — PHYSICAL THERAPY INITIAL EVALUATION ADULT - PERTINENT HX OF CURRENT PROBLEM, REHAB EVAL
Pt is 77 y.o. M with HTN, HLD, right hip osteoarthritis. Pt reports R hip pain radiating to groin and thigh. Pain is sharp, P/S 4/10. Pain limits ADLs including walking. He plays golf and tennis a few times a week. Per patient the goal of surgery to reduce pain and improve quality of life. Pt is s/p R NABIL direct anterior approach.

## 2023-05-31 NOTE — BRIEF OPERATIVE NOTE - NSICDXBRIEFPROCEDURE_GEN_ALL_CORE_FT
PROCEDURES:  Total hip arthroplasty by direct anterior approach 31-May-2023 13:29:26 Right Yovany Irving

## 2023-05-31 NOTE — PHYSICAL THERAPY INITIAL EVALUATION ADULT - PHYSICAL ASSIST/NONPHYSICAL ASSIST: SIT/SUPINE, REHAB EVAL
verbal cues/2 person assist Secondary to becoming orthostatic and being dependently transferred from chair to bed./verbal cues/2 person assist

## 2023-05-31 NOTE — PATIENT PROFILE ADULT - FALL HARM RISK - HARM RISK INTERVENTIONS
Assistance with ambulation/Assistance OOB with selected safe patient handling equipment/Communicate Risk of Fall with Harm to all staff/Discuss with provider need for PT consult/Monitor gait and stability/Provide patient with walking aids - walker, cane, crutches/Reinforce activity limits and safety measures with patient and family/Sit up slowly, dangle for a short time, stand at bedside before walking/Tailored Fall Risk Interventions/Use of alarms - bed, chair and/or voice tab/Visual Cue: Yellow wristband and red socks/Bed in lowest position, wheels locked, appropriate side rails in place/Call bell, personal items and telephone in reach/Instruct patient to call for assistance before getting out of bed or chair/Non-slip footwear when patient is out of bed/[place] to call system/Physically safe environment - no spills, clutter or unnecessary equipment/Purposeful Proactive Rounding/Room/bathroom lighting operational, light cord in reach

## 2023-05-31 NOTE — OCCUPATIONAL THERAPY INITIAL EVALUATION ADULT - PERTINENT HX OF CURRENT PROBLEM, REHAB EVAL
This is a 77 year old male with HTN, HLD, right hip osteoarthritis. Presenting to [facility] for scheduled Right total hip arthoplasty by Dr. Mcclain on 5/31/23. Patient reports right hip pain radiating to groin and thigh. Pain is sharp, P/S 4/10. Pain limits ADLs including walking. He plays golf and tennis a few times a week. Per patient the goal of surgery to reduce pain and improve quality of life.

## 2023-06-01 ENCOUNTER — TRANSCRIPTION ENCOUNTER (OUTPATIENT)
Age: 78
End: 2023-06-01

## 2023-06-01 VITALS
TEMPERATURE: 99 F | HEART RATE: 66 BPM | DIASTOLIC BLOOD PRESSURE: 66 MMHG | RESPIRATION RATE: 18 BRPM | OXYGEN SATURATION: 96 % | SYSTOLIC BLOOD PRESSURE: 103 MMHG

## 2023-06-01 LAB
ANION GAP SERPL CALC-SCNC: 11 MMOL/L — SIGNIFICANT CHANGE UP (ref 5–17)
BUN SERPL-MCNC: 17 MG/DL — SIGNIFICANT CHANGE UP (ref 7–23)
CALCIUM SERPL-MCNC: 8.8 MG/DL — SIGNIFICANT CHANGE UP (ref 8.4–10.5)
CHLORIDE SERPL-SCNC: 105 MMOL/L — SIGNIFICANT CHANGE UP (ref 96–108)
CO2 SERPL-SCNC: 24 MMOL/L — SIGNIFICANT CHANGE UP (ref 22–31)
CREAT SERPL-MCNC: 0.77 MG/DL — SIGNIFICANT CHANGE UP (ref 0.5–1.3)
EGFR: 92 ML/MIN/1.73M2 — SIGNIFICANT CHANGE UP
GLUCOSE SERPL-MCNC: 103 MG/DL — HIGH (ref 70–99)
HCT VFR BLD CALC: 33.4 % — LOW (ref 39–50)
HGB BLD-MCNC: 11.7 G/DL — LOW (ref 13–17)
MCHC RBC-ENTMCNC: 33.1 PG — SIGNIFICANT CHANGE UP (ref 27–34)
MCHC RBC-ENTMCNC: 35 GM/DL — SIGNIFICANT CHANGE UP (ref 32–36)
MCV RBC AUTO: 94.6 FL — SIGNIFICANT CHANGE UP (ref 80–100)
NRBC # BLD: 0 /100 WBCS — SIGNIFICANT CHANGE UP (ref 0–0)
PLATELET # BLD AUTO: 174 K/UL — SIGNIFICANT CHANGE UP (ref 150–400)
POTASSIUM SERPL-MCNC: 4.5 MMOL/L — SIGNIFICANT CHANGE UP (ref 3.5–5.3)
POTASSIUM SERPL-SCNC: 4.5 MMOL/L — SIGNIFICANT CHANGE UP (ref 3.5–5.3)
RBC # BLD: 3.53 M/UL — LOW (ref 4.2–5.8)
RBC # FLD: 14.6 % — HIGH (ref 10.3–14.5)
SODIUM SERPL-SCNC: 140 MMOL/L — SIGNIFICANT CHANGE UP (ref 135–145)
WBC # BLD: 8.66 K/UL — SIGNIFICANT CHANGE UP (ref 3.8–10.5)
WBC # FLD AUTO: 8.66 K/UL — SIGNIFICANT CHANGE UP (ref 3.8–10.5)

## 2023-06-01 PROCEDURE — 72170 X-RAY EXAM OF PELVIS: CPT

## 2023-06-01 PROCEDURE — C1776: CPT

## 2023-06-01 PROCEDURE — 97162 PT EVAL MOD COMPLEX 30 MIN: CPT

## 2023-06-01 PROCEDURE — 27130 TOTAL HIP ARTHROPLASTY: CPT | Mod: RT

## 2023-06-01 PROCEDURE — 36415 COLL VENOUS BLD VENIPUNCTURE: CPT

## 2023-06-01 PROCEDURE — 97165 OT EVAL LOW COMPLEX 30 MIN: CPT

## 2023-06-01 PROCEDURE — 85027 COMPLETE CBC AUTOMATED: CPT

## 2023-06-01 PROCEDURE — 97530 THERAPEUTIC ACTIVITIES: CPT

## 2023-06-01 PROCEDURE — 97116 GAIT TRAINING THERAPY: CPT

## 2023-06-01 PROCEDURE — 82962 GLUCOSE BLOOD TEST: CPT

## 2023-06-01 PROCEDURE — 97110 THERAPEUTIC EXERCISES: CPT

## 2023-06-01 PROCEDURE — 97535 SELF CARE MNGMENT TRAINING: CPT

## 2023-06-01 PROCEDURE — 80048 BASIC METABOLIC PNL TOTAL CA: CPT

## 2023-06-01 RX ORDER — TRAMADOL HYDROCHLORIDE 50 MG/1
1 TABLET ORAL
Qty: 28 | Refills: 0
Start: 2023-06-01 | End: 2023-06-07

## 2023-06-01 RX ORDER — METOPROLOL TARTRATE 50 MG
1 TABLET ORAL
Qty: 0 | Refills: 0 | DISCHARGE

## 2023-06-01 RX ORDER — SERTRALINE 25 MG/1
75 TABLET, FILM COATED ORAL DAILY
Refills: 0 | Status: DISCONTINUED | OUTPATIENT
Start: 2023-06-01 | End: 2023-06-01

## 2023-06-01 RX ORDER — DOCUSATE SODIUM 100 MG
1 CAPSULE ORAL
Qty: 14 | Refills: 0
Start: 2023-06-01 | End: 2023-06-07

## 2023-06-01 RX ORDER — OXYCODONE HYDROCHLORIDE 5 MG/1
1 TABLET ORAL
Qty: 50 | Refills: 0
Start: 2023-06-01 | End: 2023-06-07

## 2023-06-01 RX ORDER — NALOXONE HYDROCHLORIDE 4 MG/.1ML
4 SPRAY NASAL
Qty: 1 | Refills: 0
Start: 2023-06-01

## 2023-06-01 RX ORDER — SENNA PLUS 8.6 MG/1
2 TABLET ORAL
Qty: 14 | Refills: 0
Start: 2023-06-01 | End: 2023-06-07

## 2023-06-01 RX ORDER — AMLODIPINE BESYLATE 2.5 MG/1
1 TABLET ORAL
Qty: 0 | Refills: 0 | DISCHARGE

## 2023-06-01 RX ORDER — ACETAMINOPHEN 500 MG
3 TABLET ORAL
Qty: 126 | Refills: 0
Start: 2023-06-01 | End: 2023-06-14

## 2023-06-01 RX ORDER — SERTRALINE 25 MG/1
1 TABLET, FILM COATED ORAL
Qty: 0 | Refills: 0 | DISCHARGE

## 2023-06-01 RX ORDER — PANTOPRAZOLE SODIUM 20 MG/1
1 TABLET, DELAYED RELEASE ORAL
Qty: 30 | Refills: 0
Start: 2023-06-01 | End: 2023-06-30

## 2023-06-01 RX ORDER — IRBESARTAN 75 MG/1
1 TABLET ORAL
Qty: 0 | Refills: 0 | DISCHARGE

## 2023-06-01 RX ORDER — APIXABAN 2.5 MG/1
1 TABLET, FILM COATED ORAL
Qty: 60 | Refills: 0
Start: 2023-06-01 | End: 2023-06-30

## 2023-06-01 RX ADMIN — APIXABAN 2.5 MILLIGRAM(S): 2.5 TABLET, FILM COATED ORAL at 06:12

## 2023-06-01 RX ADMIN — LOSARTAN POTASSIUM 25 MILLIGRAM(S): 100 TABLET, FILM COATED ORAL at 06:13

## 2023-06-01 RX ADMIN — Medication 101.6 MILLIGRAM(S): at 06:09

## 2023-06-01 RX ADMIN — Medication 50 MILLIGRAM(S): at 06:13

## 2023-06-01 RX ADMIN — SERTRALINE 75 MILLIGRAM(S): 25 TABLET, FILM COATED ORAL at 06:41

## 2023-06-01 RX ADMIN — Medication 50 MICROGRAM(S): at 06:13

## 2023-06-01 RX ADMIN — SODIUM CHLORIDE 1000 MILLILITER(S): 9 INJECTION, SOLUTION INTRAVENOUS at 06:08

## 2023-06-01 RX ADMIN — AMLODIPINE BESYLATE 5 MILLIGRAM(S): 2.5 TABLET ORAL at 06:13

## 2023-06-01 RX ADMIN — Medication 400 MILLIGRAM(S): at 07:16

## 2023-06-01 RX ADMIN — PANTOPRAZOLE SODIUM 40 MILLIGRAM(S): 20 TABLET, DELAYED RELEASE ORAL at 06:13

## 2023-06-01 RX ADMIN — Medication 1000 MILLIGRAM(S): at 07:38

## 2023-06-01 NOTE — DISCHARGE NOTE NURSING/CASE MANAGEMENT/SOCIAL WORK - PATIENT PORTAL LINK FT
You can access the FollowMyHealth Patient Portal offered by [facility] by registering at the following website: [email or web address]. By joining [employer]s FollowMyHealth portal, you will also be able to view your health information using other applications (apps) compatible with our system.

## 2023-06-01 NOTE — DISCHARGE NOTE PROVIDER - HOSPITAL COURSE
History of Present Illness:  This is a 77 year old male with HTN, HLD, right hip osteoarthritis. Presenting to [facility] for scheduled Right total hip arthoplasty by Dr. Mcclain on 5/31/23.  Patient reports right hip pain radiating to groin and thigh. Pain is sharp, P/S 4/10. Pain limits ADLs including walking. He plays golf and tennis a few times a week.     Goals of Care: "to reduce pain and improve quality of life."     PAST MEDICAL HISTORY:  Former smoker   HLD (hyperlipidemia)   HTN (hypertension)   Malignant melanoma of skin, unspecified   Radiculopathy, lumbar region   Renal cancer, left   Renal cyst   Situational anxiety   Spondylolisthesis, lumbar region.     PAST SURGICAL HISTORY:  H/O left nephrectomy Partial  H/O melanoma excision right shoulder in 2015  H/O needle biopsy of prostate  S/P laminectomy lumbar 8/2022  S/P wrist surgery left wrist, 30 years ago with hardware placement and excision of hardware months later.     Hospital Course:  After admission on 5/31/23 and receiving pre-operative parenteral prophylactic antibiotics, the patient underwent an uncomplicated Right total hip replacement with anterior approach, with Dr. Mcclain.   Patient tolerated the procedure well and was transferred to the recovery room in stable condition, with a stable neuro / vascular exam of the operated extremity.    Patient was placed on Eliquis for DVT ppx, and was placed on Protonix for GI protection.   Patient was made weight bearing as tolerated with the operative leg.     Patient evaluated by PT/OT and recommended for disposition for home with home PT.     Discharge and Orthopedic Care instructions were delineated in the Discharge Plan and reviewed with the patient. All medications were delineated in the medication reconciliation tool and key points were reviewed with the patient. They were deemed stable from an Orthopedic & medical standpoint for discharge. History of Present Illness:  This is a 77 year old male with HTN, HLD, right hip osteoarthritis. Presenting to [facility] for scheduled Right total hip arthoplasty by Dr. Mcclain on 5/31/23.  Patient reports right hip pain radiating to groin and thigh. Pain is sharp, P/S 4/10. Pain limits ADLs including walking. He plays golf and tennis a few times a week.     Goals of Care: "to reduce pain and improve quality of life."     PAST MEDICAL HISTORY:  Former smoker   HLD (hyperlipidemia)   HTN (hypertension)   Malignant melanoma of skin, unspecified   Radiculopathy, lumbar region   Renal cancer, left   Renal cyst   Situational anxiety   Spondylolisthesis, lumbar region.     PAST SURGICAL HISTORY:  H/O left nephrectomy Partial  H/O melanoma excision right shoulder in 2015  H/O needle biopsy of prostate  S/P laminectomy lumbar 8/2022  S/P wrist surgery left wrist, 30 years ago with hardware placement and excision of hardware months later.     Hospital Course:  After admission on 5/31/23 and receiving pre-operative parenteral prophylactic antibiotics, the patient underwent an uncomplicated Right total hip replacement with anterior approach, with Dr. Mcclain.   Patient tolerated the procedure well and was transferred to the recovery room in stable condition, with a stable neuro / vascular exam of the operated extremity.    Patient was placed on Eliquis for DVT ppx, and was placed on Protonix for GI protection.   Patient was made weight bearing as tolerated with the operative leg.     Patient evaluated by PT/OT and recommended for disposition for home with outpatient PT once cleared to begin by Dr. Mcclain. Will continue with home exercise in the interim.     Discharge and Orthopedic Care instructions were delineated in the Discharge Plan and reviewed with the patient. All medications were delineated in the medication reconciliation tool and key points were reviewed with the patient. They were deemed stable from an Orthopedic & medical standpoint for discharge. History of Present Illness:  This is a 77 year old male with HTN, HLD, right hip osteoarthritis. Presenting to [facility] for scheduled Right total hip arthoplasty by Dr. Mcclain on 5/31/23.  Patient reports right hip pain radiating to groin and thigh. Pain is sharp, P/S 4/10. Pain limits ADLs including walking. He plays golf and tennis a few times a week.     Goals of Care: "to reduce pain and improve quality of life."     PAST MEDICAL HISTORY:  Former smoker   HLD (hyperlipidemia)   HTN (hypertension)   Malignant melanoma of skin, unspecified   Radiculopathy, lumbar region   Renal cancer, left   Renal cyst   Situational anxiety   Spondylolisthesis, lumbar region.     PAST SURGICAL HISTORY:  H/O left nephrectomy Partial  H/O melanoma excision right shoulder in 2015  H/O needle biopsy of prostate  S/P laminectomy lumbar 8/2022  S/P wrist surgery left wrist, 30 years ago with hardware placement and excision of hardware months later.     Hospital Course:  After admission on 5/31/23 and receiving pre-operative parenteral prophylactic antibiotics, the patient underwent an uncomplicated Right total hip replacement with anterior approach, with Dr. Mcclain.   Patient tolerated the procedure well and was transferred to the recovery room in stable condition, with a stable neuro / vascular exam of the operated extremity.    Patient was placed on Eliquis for DVT ppx, and was placed on Protonix for GI protection.   Patient was made weight bearing as tolerated with the operative leg.     BP meds held x 1 week post op due to orthostatic hypotension.     Patient evaluated by PT/OT and recommended for disposition for home with outpatient PT once cleared to begin by Dr. Mcclain. Will continue with home exercise in the interim.     Discharge and Orthopedic Care instructions were delineated in the Discharge Plan and reviewed with the patient. All medications were delineated in the medication reconciliation tool and key points were reviewed with the patient. They were deemed stable from an Orthopedic & medical standpoint for discharge.

## 2023-06-01 NOTE — DISCHARGE NOTE PROVIDER - NSDCCPTREATMENT_GEN_ALL_CORE_FT
PRINCIPAL PROCEDURE  Procedure: Total hip arthroplasty by direct anterior approach  Findings and Treatment: Right

## 2023-06-01 NOTE — CHART NOTE - NSCHARTNOTEFT_GEN_A_CORE
Nutrition Services    Consult received for "MST Score >2." Upon chart review, patient with stable weight, does not currently meet criteria for MST, RD remains available for assessment per protocol or as needed.     Ariella Cast RDN, CDN #[id number] / preferred TEAMS
Resting without complaints.  No Chest Pain, SOB, N/V.    T(C): 36.4 (05-31-23 @ 11:00), Max: 37 (05-31-23 @ 06:45)  HR: 61 (05-31-23 @ 11:00) (51 - 61)  BP: 118/65 (05-31-23 @ 11:00) (102/55 - 121/76)  RR: 12 (05-31-23 @ 11:00) (12 - 18)  SpO2: 98% (05-31-23 @ 11:00) (93% - 100%)      Exam:  Alert and [place], No Acute Distress  Card: +S1/S2, RRR  Pulm: CTAB  Laterality: R Hip  Calves soft, non-tender bilaterally  +PF/DF/EHL/FHL  SILT  + DP pulse    Xray: S/P R Total Hip Arthroplasty, prosthesis in place and intact with no signs of salma-prosthetic Fx.             A/P: 77y Male S/p R Total Hip Arthroplasty Anterior Approach. VSS. NAD  -PT/OT-WBAT RLE  -FU AM Labs tomorrow  -IS  -DVT PPx: Eliquis 2.5mg PO BID, SCDs, early OOB and ambulation  -Pain Control  -Continue Current Tx  -Dispo planning PACU to Floor    Donovan Waller PA-C  Orthopedic Surgery Team  Team Pager #[phone]

## 2023-06-01 NOTE — DISCHARGE NOTE NURSING/CASE MANAGEMENT/SOCIAL WORK - NSDCPEFALRISK_GEN_ALL_CORE
For information on Fall & Injury Prevention, visit: [email or web address] OR  [email or web address] OR  https://www.cdc.gov/steadi/patient.html

## 2023-06-01 NOTE — PROGRESS NOTE ADULT - ASSESSMENT
A/P:  Pt is a 77 yr old male s/p right total hip Arthroplasty, POD #1    - Pain control/ Analgesia  - DVT ppx Eliquis 2.5 BID x 4 weeks, SCDs  - PT/OT - wbat/oob   - Incentive Spirometer  - GI prophylaxis: Protonix  - notify Ortho for questions   - Dispo: Pending PT clearance and AM labs for dispo home today.

## 2023-06-01 NOTE — DISCHARGE NOTE PROVIDER - CARE PROVIDER_API CALL
John Mcclain  Orthopaedic Surgery  [address], [address]  [place] [address]  Phone: [phone]  Fax: [phone]  Established Patient  Follow Up Time: 2 weeks

## 2023-06-01 NOTE — DISCHARGE NOTE PROVIDER - NSDCMRMEDTOKEN_GEN_ALL_CORE_FT
acetaminophen 325 mg oral tablet: 3 tab(s) orally every 8 hours x 1 week, then as needed for mild pain  Ambien 5 mg oral tablet: 0.5 orally once a day  amLODIPine 5 mg oral tablet: 1 tab(s) orally once a day  apixaban 2.5 mg oral tablet: 1 tab(s) orally every 12 hours x 4 weeks post op for blood clot prevention  Centrum oral tablet: 1 tab(s) orally once a day Pm  clonazePAM 0.5 mg oral tablet: 1 tab(s) orally once a day Pm  Colace 100 mg oral capsule: 1 cap(s) orally 2 times a day as needed for  constipation  irbesartan 75 mg oral tablet: 1 tab(s) orally once a day  Metoprolol Succinate ER 50 mg oral tablet, extended release: 1 tab(s) orally once a day Pm  mirtazapine 45 mg oral tablet: 1 tab(s) orally once a day (at bedtime)  Narcan 4 mg/0.1 mL nasal spray: 4 milligram(s) intranasally every 2 to 3 minutes alternating nostrils as needed for overdose  oxyCODONE 5 mg oral tablet: 1 tab(s) orally every 4 hours as needed for Moderate Pain (4 - 6) ; 2 tabs orally every 4 hours as needed for severe pain MDD: 008  pantoprazole 40 mg oral delayed release tablet: 1 tab(s) orally once a day (before a meal)  QUEtiapine 50 mg oral tablet: 1 tab(s) orally once a day Pm  rosuvastatin 5 mg oral tablet: 1 tab(s) orally once a day Pm  senna leaf extract oral tablet: 2 tab(s) orally once a day (at bedtime) as needed for  constipation  Synthroid 50 mcg (0.05 mg) oral tablet: 1 tab(s) orally once a day  tamsulosin 0.4 mg oral capsule: 1 cap(s) orally once a day (at bedtime)  traMADol 50 mg oral tablet: 1 tab(s) orally every 6 hours as needed for Mild Pain (1 - 3) MDD: 004  Vitamin D3 25 mcg (1000 intl units) oral tablet: 1 tab(s) orally once a day Pm  Zoloft 25 mg oral tablet: 1 tab(s) orally once a day Am   acetaminophen 325 mg oral tablet: 3 tab(s) orally every 8 hours x 1 week, then as needed for mild pain  Ambien 5 mg oral tablet: 0.5 orally once a day  amLODIPine 5 mg oral tablet: 1 tab(s) orally once a day HOLD this medication x 1 week as per Dr. Mcclain.  apixaban 2.5 mg oral tablet: 1 tab(s) orally every 12 hours x 4 weeks post op for blood clot prevention  Centrum oral tablet: 1 tab(s) orally once a day Pm  clonazePAM 0.5 mg oral tablet: 1 tab(s) orally once a day Pm  Colace 100 mg oral capsule: 1 cap(s) orally 2 times a day as needed for  constipation  irbesartan 75 mg oral tablet: 1 tab(s) orally once a day hold x 1 week post op for BP management  Metoprolol Succinate ER 50 mg oral tablet, extended release: 1 tab(s) orally once a day  mirtazapine 45 mg oral tablet: 1 tab(s) orally once a day (at bedtime)  Narcan 4 mg/0.1 mL nasal spray: 4 milligram(s) intranasally every 2 to 3 minutes alternating nostrils as needed for overdose  oxyCODONE 5 mg oral tablet: 1 tab(s) orally every 4 hours as needed for Moderate Pain (4 - 6) ; 2 tabs orally every 4 hours as needed for severe pain MDD: 008  pantoprazole 40 mg oral delayed release tablet: 1 tab(s) orally once a day (before a meal)  QUEtiapine 50 mg oral tablet: 1 tab(s) orally once a day Pm  rosuvastatin 5 mg oral tablet: 1 tab(s) orally once a day Pm  senna leaf extract oral tablet: 2 tab(s) orally once a day (at bedtime) as needed for  constipation  Synthroid 50 mcg (0.05 mg) oral tablet: 1 tab(s) orally once a day  tamsulosin 0.4 mg oral capsule: 1 cap(s) orally once a day (at bedtime)  traMADol 50 mg oral tablet: 1 tab(s) orally every 6 hours as needed for Mild Pain (1 - 3) MDD: 004  Vitamin D3 25 mcg (1000 intl units) oral tablet: 1 tab(s) orally once a day Pm  Zoloft 25 mg oral tablet: 1 tab(s) orally once a day Am

## 2023-06-01 NOTE — DISCHARGE NOTE PROVIDER - NSDCCPCAREPLAN_GEN_ALL_CORE_FT
PRINCIPAL DISCHARGE DIAGNOSIS  Diagnosis: Unilateral osteoarthritis of hip, right  Assessment and Plan of Treatment:

## 2023-06-01 NOTE — DISCHARGE NOTE PROVIDER - NSDCFUADDINST_GEN_ALL_CORE_FT
Please follow up with Dr. Mcclain at your pre-scheduled post-operative follow up appointment. (Call to confirm)  Dressing care:   -Keep dressing clean, dry, and intact. Do not remove bandage until date written on bandage.   Continue to ambulate as tolerated to the operative leg. Continue with anterior hip precautions    Pain medications:   Standing:         -Acetaminophen 500mg - 2 tabs every 8 hours  As needed:        -Tramadol 50mg - 1 tab every 8-12 hours - Take only if needed for MODERATE pain       -oxycodone 5mg - 1 tab every 4-6 hours - Take only if needed for SEVERE or BREAKTHROUGH pain    Other Medications: (Standing)  -Eliquis 2.5 mg every 12 hours - to prevent blood clots (for 4 weeks post operatively.)  -Protonix 40mg - 1 tab every 24 hours - to prevent stomach irritation/ulcers  -Senna 8.6mg - 2 pills every 24 hours - stool softener  -colace 100mg - 1 pill every 8 hours - stool softener.     Recommended to follow up with your primary care provider within 1-2 months of hospital discharge to discuss your recent surgery and any change to your medications.

## 2023-06-01 NOTE — PROGRESS NOTE ADULT - SUBJECTIVE AND OBJECTIVE BOX
ORTHO PROGRESS NOTE     Patient is status post right total hip replacement with anterior approach, POD#1  Pt seen and examined at bedside, denies SOB, CP, Dizziness. N/V/D/HA.    No significant overnight events. Pain well controlled. Patient ambulated with PT, recommended for home with home PT.     Vital Signs Last 24 Hrs  T(C): 37.1 (01 Jun 2023 04:33), Max: 37.1 (01 Jun 2023 04:33)  T(F): 98.7 (01 Jun 2023 04:33), Max: 98.7 (01 Jun 2023 04:33)  HR: 74 (01 Jun 2023 04:33) (51 - 99)  BP: 145/85 (01 Jun 2023 04:33) (67/37 - 160/65)  BP(mean): 88 (31 May 2023 16:34) (47 - 106)  RR: 18 (01 Jun 2023 04:33) (12 - 18)  SpO2: 95% (01 Jun 2023 04:33) (91% - 100%)    Parameters below as of 01 Jun 2023 04:33  Patient On (Oxygen Delivery Method): room air      Exam:   Gen: No apparent distress  RLE:  Aquacel clean dry and intact to right anterior hip  BLE: motor intact EHL/FHL/TA/GS .  Sensation is grossly intact to light touch in the distal extremities.    Compartments are soft bilaterally.  Extremities are warm .  DP 2+ BLE     Labs:  pending this AM                Rika Coleman PA-C  Orthopedic Surgery  Team Pager #[phone]

## 2023-06-02 ENCOUNTER — TRANSCRIPTION ENCOUNTER (OUTPATIENT)
Age: 78
End: 2023-06-02

## 2023-06-05 RX ORDER — TAMSULOSIN HYDROCHLORIDE 0.4 MG/1
1 CAPSULE ORAL
Refills: 0 | DISCHARGE

## 2023-06-06 ENCOUNTER — TRANSCRIPTION ENCOUNTER (OUTPATIENT)
Age: 78
End: 2023-06-06

## 2023-06-06 PROCEDURE — 90834 PSYTX W PT 45 MINUTES: CPT | Mod: 95

## 2023-06-15 ENCOUNTER — APPOINTMENT (OUTPATIENT)
Dept: ORTHOPEDIC SURGERY | Facility: CLINIC | Age: 78
End: 2023-06-15

## 2023-06-16 ENCOUNTER — APPOINTMENT (OUTPATIENT)
Dept: ORTHOPEDIC SURGERY | Facility: CLINIC | Age: 78
End: 2023-06-16
Payer: MEDICARE

## 2023-06-16 ENCOUNTER — TRANSCRIPTION ENCOUNTER (OUTPATIENT)
Age: 78
End: 2023-06-16

## 2023-06-16 VITALS — HEIGHT: 70 IN | WEIGHT: 175 LBS | BODY MASS INDEX: 25.05 KG/M2

## 2023-06-16 PROCEDURE — 90834 PSYTX W PT 45 MINUTES: CPT | Mod: 95

## 2023-06-16 PROCEDURE — 73502 X-RAY EXAM HIP UNI 2-3 VIEWS: CPT

## 2023-06-16 PROCEDURE — 99024 POSTOP FOLLOW-UP VISIT: CPT

## 2023-06-16 NOTE — DISCUSSION/SUMMARY
[de-identified] : The patient is doing well after joint replacement surgery. Written infectious precautions were reviewed. The patient will progress with physical therapy at this time and they will work on transitioning from requiring assistive devices for ambulation. Anti-coagulant therapy will be discontinued at 1 month post surgery for the purpose of orthopedic thromboembolism prophylaxis. Return around the 6 week anniversary from surgery for follow-up evaluation.

## 2023-06-16 NOTE — PHYSICAL EXAM
[de-identified] : Well developed, well nourished in no apparent distress, awake, alert and orientated to person, place and time with appropriate mood and affect\par Respirations are even and unlabored. Gait evaluation does not reveal a limp. There is no inguinal adenopathy. The affected limb is well-perfused with palpable pedal pulse, without skin lesions, shows a grossly normal motor and sensory examination. Incision is CDI Hip motion is full and painless throughout ROM. Leg lengths are approximately equal  [de-identified] : AP pelvis, AP hip, and lateral x-rays of the right hip were ordered and obtained in the office and demonstrate satisfactory position and alignment of the components are present. No signs of loosening are seen.

## 2023-06-16 NOTE — HISTORY OF PRESENT ILLNESS
[de-identified] : Status-post right total hip  arthroplasty here for initial postoperative evaluation. Excellent progress is noted in terms of pain and restoration of function. Pain is well controlled with oral medications. There has been no change in medical health since discharge. The patient does require assistive devices.

## 2023-06-20 PROCEDURE — 99214 OFFICE O/P EST MOD 30 MIN: CPT | Mod: 95

## 2023-06-23 PROCEDURE — 90834 PSYTX W PT 45 MINUTES: CPT | Mod: 95

## 2023-06-29 PROCEDURE — 90834 PSYTX W PT 45 MINUTES: CPT | Mod: 95

## 2023-07-03 ENCOUNTER — OFFICE (OUTPATIENT)
Dept: URBAN - METROPOLITAN AREA CLINIC 35 | Facility: CLINIC | Age: 78
Setting detail: OPHTHALMOLOGY
End: 2023-07-03
Payer: MEDICARE

## 2023-07-03 DIAGNOSIS — H10.89: ICD-10-CM

## 2023-07-03 DIAGNOSIS — H00.14: ICD-10-CM
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
## 2023-07-03 DIAGNOSIS — L03.213: ICD-10-CM

## 2023-07-03 PROCEDURE — 55555 MISCELLANEOUS CHARGES: CPT | Performed by: OPHTHALMOLOGY

## 2023-07-03 PROCEDURE — 92012 INTRM OPH EXAM EST PATIENT: CPT | Performed by: OPHTHALMOLOGY

## 2023-07-03 ASSESSMENT — REFRACTION_CURRENTRX
OD_VPRISM_DIRECTION: BF
OD_SPHERE: +1.00
OD_AXIS: 081
OD_ADD: +2.75
OS_ADD: +2.75
OS_OVR_VA: 20/
OD_CYLINDER: +1.00
OS_CYLINDER: SPHERE
OS_VPRISM_DIRECTION: BF
OS_SPHERE: +1.50
OD_OVR_VA: 20/

## 2023-07-03 ASSESSMENT — REFRACTION_MANIFEST
OD_SPHERE: +2.25
OS_CYLINDER: -0.25
OD_ADD: +2.50
OS_SPHERE: +2.25
OS_ADD: +2.50
OS_AXIS: 11
OS_SPHERE: +2.00
OD_AXIS: 178
OD_CYLINDER: -0.75
OD_VA1: 20/25
OS_VA1: 20/25
OS_CYLINDER: -0.75
OD_AXIS: 180
OS_ADD: +2.50
OD_ADD: +2.50
OD_VA1: 20/20
OS_VA1: 20/20
OD_CYLINDER: -1.00
OS_AXIS: 65
OD_SPHERE: +2.50

## 2023-07-03 ASSESSMENT — CONFRONTATIONAL VISUAL FIELD TEST (CVF)
OD_FINDINGS: FULL
OS_FINDINGS: FULL

## 2023-07-03 ASSESSMENT — SUPERFICIAL PUNCTATE KERATITIS (SPK)
OS_SPK: 1+
OD_SPK: 1+

## 2023-07-03 ASSESSMENT — AXIALLENGTH_DERIVED
OS_AL: 24.4035
OD_AL: 23.9635
OD_AL: 23.9635
OS_AL: 24.4035
OD_AL: 24.1152
OS_AL: 24.3515

## 2023-07-03 ASSESSMENT — REFRACTION_AUTOREFRACTION
OS_AXIS: 169
OD_CYLINDER: +0.75
OS_CYLINDER: +1.50
OS_SPHERE: +1.25
OD_AXIS: 080
OD_SPHERE: +1.75

## 2023-07-03 ASSESSMENT — SPHEQUIV_DERIVED
OD_SPHEQUIV: 2.125
OS_SPHEQUIV: 1.875
OD_SPHEQUIV: 1.75
OS_SPHEQUIV: 2
OS_SPHEQUIV: 1.875
OD_SPHEQUIV: 2.125

## 2023-07-03 ASSESSMENT — KERATOMETRY
OD_K1POWER_DIOPTERS: 40.00
OD_AXISANGLE_DEGREES: 97
OS_K1POWER_DIOPTERS: 39.00
OD_K2POWER_DIOPTERS: 40.50
OS_AXISANGLE_DEGREES: 163
OS_K2POWER_DIOPTERS: 39.75

## 2023-07-03 ASSESSMENT — LID EXAM ASSESSMENTS
OS_BLEPHARITIS: LLL LUL T
OD_BLEPHARITIS: RLL RUL T

## 2023-07-03 ASSESSMENT — VISUAL ACUITY
OD_BCVA: 20/25-2
OS_BCVA: 20/20

## 2023-07-06 ENCOUNTER — TRANSCRIPTION ENCOUNTER (OUTPATIENT)
Age: 78
End: 2023-07-06

## 2023-07-14 PROCEDURE — 90834 PSYTX W PT 45 MINUTES: CPT | Mod: 95

## 2023-07-17 ENCOUNTER — OFFICE (OUTPATIENT)
Dept: URBAN - METROPOLITAN AREA CLINIC 35 | Facility: CLINIC | Age: 78
Setting detail: OPHTHALMOLOGY
End: 2023-07-17
Payer: MEDICARE

## 2023-07-17 DIAGNOSIS — H02.822: ICD-10-CM

## 2023-07-17 DIAGNOSIS — H00.14: ICD-10-CM

## 2023-07-17 PROBLEM — H10.89 BACTERIAL CONJUNCTIVITIS ; LEFT EYE: Status: RESOLVED | Noted: 2023-07-03 | Resolved: 2023-07-17

## 2023-07-17 PROBLEM — L03.213 PRESEPTAL CELLULITIS ; LEFT EYE: Status: RESOLVED | Noted: 2023-07-03 | Resolved: 2023-07-17

## 2023-07-17 PROCEDURE — 92012 INTRM OPH EXAM EST PATIENT: CPT | Performed by: OPHTHALMOLOGY

## 2023-07-17 ASSESSMENT — KERATOMETRY
OD_AXISANGLE_DEGREES: 097
OS_K1POWER_DIOPTERS: 39.00
OD_K1POWER_DIOPTERS: 40.00
OD_K2POWER_DIOPTERS: 40.75
OS_K2POWER_DIOPTERS: 40.25
OS_AXISANGLE_DEGREES: 153

## 2023-07-17 ASSESSMENT — REFRACTION_CURRENTRX
OS_VPRISM_DIRECTION: BF
OS_SPHERE: +1.50
OD_AXIS: 081
OD_VPRISM_DIRECTION: BF
OD_SPHERE: +1.00
OS_ADD: +2.75
OD_ADD: +2.75
OS_OVR_VA: 20/
OD_CYLINDER: +1.00
OS_CYLINDER: SPHERE
OD_OVR_VA: 20/

## 2023-07-17 ASSESSMENT — AXIALLENGTH_DERIVED
OD_AL: 24.0673
OS_AL: 24.3057
OD_AL: 23.9162
OS_AL: 24.3057
OS_AL: 24.2541
OD_AL: 23.9162

## 2023-07-17 ASSESSMENT — REFRACTION_MANIFEST
OS_CYLINDER: -0.25
OD_AXIS: 180
OS_AXIS: 65
OS_VA1: 20/25
OS_AXIS: 11
OS_CYLINDER: -0.75
OD_VA1: 20/25
OD_ADD: +2.50
OD_CYLINDER: -0.75
OS_SPHERE: +2.00
OS_SPHERE: +2.25
OS_ADD: +2.50
OS_VA1: 20/20
OD_CYLINDER: -1.00
OD_VA1: 20/20
OD_SPHERE: +2.50
OD_ADD: +2.50
OD_SPHERE: +2.25
OS_ADD: +2.50
OD_AXIS: 178

## 2023-07-17 ASSESSMENT — SPHEQUIV_DERIVED
OD_SPHEQUIV: 2.125
OD_SPHEQUIV: 2.125
OD_SPHEQUIV: 1.75
OS_SPHEQUIV: 2
OS_SPHEQUIV: 1.875
OS_SPHEQUIV: 1.875

## 2023-07-17 ASSESSMENT — REFRACTION_AUTOREFRACTION
OS_AXIS: 167
OD_SPHERE: +1.75
OS_SPHERE: +1.25
OD_CYLINDER: +0.75
OS_CYLINDER: +1.50
OD_AXIS: 086

## 2023-07-17 ASSESSMENT — VISUAL ACUITY
OD_BCVA: 20/25-2+2
OS_BCVA: 20/20

## 2023-07-17 ASSESSMENT — SUPERFICIAL PUNCTATE KERATITIS (SPK)
OS_SPK: 1+
OD_SPK: 1+

## 2023-07-17 ASSESSMENT — LID EXAM ASSESSMENTS
OS_BLEPHARITIS: LLL LUL T
OD_BLEPHARITIS: RLL RUL T

## 2023-07-18 ENCOUNTER — APPOINTMENT (OUTPATIENT)
Dept: ORTHOPEDIC SURGERY | Facility: CLINIC | Age: 78
End: 2023-07-18
Payer: MEDICARE

## 2023-07-18 VITALS — BODY MASS INDEX: 25.05 KG/M2 | WEIGHT: 175 LBS | HEIGHT: 70 IN

## 2023-07-18 PROCEDURE — 99024 POSTOP FOLLOW-UP VISIT: CPT

## 2023-07-18 NOTE — DISCUSSION/SUMMARY
[de-identified] : The patient is doing well after joint replacement surgery. WBAT. Return around the 6 month anniversary from surgery for follow-up evaluation.

## 2023-07-18 NOTE — HISTORY OF PRESENT ILLNESS
[de-identified] : Status-post right total hip  arthroplasty here for routine postoperative evaluation. Excellent progress is noted in terms of pain and restoration of function. Pain is well controlled with oral medications. There has been no change in medical health since discharge. The patient does not require assistive devices.

## 2023-07-18 NOTE — PHYSICAL EXAM
[de-identified] : Well developed, well nourished in no apparent distress, awake, alert and orientated to person, place and time with appropriate mood and affect\par Respirations are even and unlabored. Gait evaluation does not reveal a limp. There is no inguinal adenopathy. The affected limb is well-perfused with palpable pedal pulse, without skin lesions, shows a grossly normal motor and sensory examination. Incision is CDI Hip motion is full and painless throughout ROM. Leg lengths are approximately equal

## 2023-07-26 PROCEDURE — 99214 OFFICE O/P EST MOD 30 MIN: CPT

## 2023-07-28 ENCOUNTER — TRANSCRIPTION ENCOUNTER (OUTPATIENT)
Age: 78
End: 2023-07-28

## 2023-08-02 ENCOUNTER — OFFICE (OUTPATIENT)
Dept: URBAN - METROPOLITAN AREA CLINIC 77 | Facility: CLINIC | Age: 78
Setting detail: OPHTHALMOLOGY
End: 2023-08-02
Payer: MEDICARE

## 2023-08-02 DIAGNOSIS — H01.004: ICD-10-CM

## 2023-08-02 DIAGNOSIS — H43.813: ICD-10-CM

## 2023-08-02 DIAGNOSIS — H01.002: ICD-10-CM

## 2023-08-02 DIAGNOSIS — H01.001: ICD-10-CM

## 2023-08-02 DIAGNOSIS — H25.13: ICD-10-CM

## 2023-08-02 DIAGNOSIS — H01.005: ICD-10-CM

## 2023-08-02 DIAGNOSIS — H40.003: ICD-10-CM

## 2023-08-02 DIAGNOSIS — H16.223: ICD-10-CM

## 2023-08-02 DIAGNOSIS — H35.3131: ICD-10-CM

## 2023-08-02 PROCEDURE — 99214 OFFICE O/P EST MOD 30 MIN: CPT | Performed by: OPHTHALMOLOGY

## 2023-08-02 PROCEDURE — 76514 ECHO EXAM OF EYE THICKNESS: CPT | Performed by: OPHTHALMOLOGY

## 2023-08-02 PROCEDURE — 92083 EXTENDED VISUAL FIELD XM: CPT | Performed by: OPHTHALMOLOGY

## 2023-08-02 PROCEDURE — 92250 FUNDUS PHOTOGRAPHY W/I&R: CPT | Performed by: OPHTHALMOLOGY

## 2023-08-02 ASSESSMENT — REFRACTION_AUTOREFRACTION
OD_CYLINDER: -1.00
OD_AXIS: 177
OS_SPHERE: +1.50
OS_AXIS: 0
OD_SPHERE: +2.00

## 2023-08-02 ASSESSMENT — REFRACTION_CURRENTRX
OD_VPRISM_DIRECTION: BF
OD_SPHERE: +1.00
OS_SPHERE: +1.50
OS_OVR_VA: 20/
OS_CYLINDER: SPHERE
OD_CYLINDER: +1.00
OS_ADD: +2.75
OD_ADD: +2.75
OD_AXIS: 081
OS_VPRISM_DIRECTION: BF
OD_OVR_VA: 20/

## 2023-08-02 ASSESSMENT — PACHYMETRY
OS_CT_UM: 562
OD_CT_CORRECTION: -1
OS_CT_CORRECTION: -1
OD_CT_UM: 555

## 2023-08-02 ASSESSMENT — AXIALLENGTH_DERIVED
OS_AL: 24.3057
OD_AL: 23.8222
OD_AL: 24.0731
OD_AL: 23.9721
OS_AL: 24.3057

## 2023-08-02 ASSESSMENT — KERATOMETRY
OS_AXISANGLE_DEGREES: 165
OS_K1POWER_DIOPTERS: 39.00
OD_AXISANGLE_DEGREES: 094
OD_K2POWER_DIOPTERS: 41.00
METHOD_AUTO_MANUAL: AUTO
OS_K2POWER_DIOPTERS: 40.25
OD_K1POWER_DIOPTERS: 40.25

## 2023-08-02 ASSESSMENT — REFRACTION_MANIFEST
OS_CYLINDER: -0.75
OD_CYLINDER: -1.00
OD_ADD: +2.50
OS_AXIS: 65
OD_ADD: +2.50
OS_ADD: +2.50
OS_SPHERE: +2.00
OD_VA1: 20/25
OD_SPHERE: +2.25
OD_AXIS: 180
OD_VA1: 20/20
OS_SPHERE: +2.25
OS_VA1: 20/20
OS_CYLINDER: -0.25
OD_SPHERE: +2.50
OD_CYLINDER: -0.75
OS_AXIS: 11
OS_ADD: +2.50
OD_AXIS: 178
OS_VA1: 20/25

## 2023-08-02 ASSESSMENT — SPHEQUIV_DERIVED
OS_SPHEQUIV: 1.875
OD_SPHEQUIV: 1.75
OD_SPHEQUIV: 1.5
OD_SPHEQUIV: 2.125
OS_SPHEQUIV: 1.875

## 2023-08-02 ASSESSMENT — SUPERFICIAL PUNCTATE KERATITIS (SPK)
OS_SPK: 1+
OD_SPK: 1+

## 2023-08-02 ASSESSMENT — LID EXAM ASSESSMENTS
OD_BLEPHARITIS: RLL RUL T
OS_BLEPHARITIS: LLL LUL T

## 2023-08-02 ASSESSMENT — CONFRONTATIONAL VISUAL FIELD TEST (CVF)
OD_FINDINGS: FULL
OS_FINDINGS: FULL

## 2023-08-02 ASSESSMENT — VISUAL ACUITY
OD_BCVA: 20/25+3
OS_BCVA: 20/20

## 2023-08-04 PROCEDURE — 90834 PSYTX W PT 45 MINUTES: CPT | Mod: 95

## 2023-08-11 PROCEDURE — 90834 PSYTX W PT 45 MINUTES: CPT | Mod: 95

## 2023-08-14 ENCOUNTER — OFFICE (OUTPATIENT)
Dept: URBAN - METROPOLITAN AREA CLINIC 35 | Facility: CLINIC | Age: 78
Setting detail: OPHTHALMOLOGY
End: 2023-08-14
Payer: MEDICARE

## 2023-08-14 DIAGNOSIS — H25.13: ICD-10-CM

## 2023-08-14 DIAGNOSIS — H01.001: ICD-10-CM

## 2023-08-14 DIAGNOSIS — H01.004: ICD-10-CM

## 2023-08-14 DIAGNOSIS — H16.223: ICD-10-CM

## 2023-08-14 DIAGNOSIS — H02.825: ICD-10-CM

## 2023-08-14 DIAGNOSIS — H40.003: ICD-10-CM

## 2023-08-14 PROCEDURE — 92012 INTRM OPH EXAM EST PATIENT: CPT | Performed by: OPHTHALMOLOGY

## 2023-08-14 ASSESSMENT — REFRACTION_CURRENTRX
OD_CYLINDER: +1.00
OS_AXIS: 0
OD_AXIS: 081
OD_VPRISM_DIRECTION: BF
OS_ADD: +2.75
OS_VPRISM_DIRECTION: BF
OS_CYLINDER: SPHERE
OD_SPHERE: +1.00
OD_OVR_VA: 20/
OS_SPHERE: +1.50
OS_OVR_VA: 20/
OD_ADD: +2.75

## 2023-08-14 ASSESSMENT — SPHEQUIV_DERIVED
OS_SPHEQUIV: 1.875
OD_SPHEQUIV: 2.125
OS_SPHEQUIV: 2
OD_SPHEQUIV: 1.75
OD_SPHEQUIV: 2
OS_SPHEQUIV: 1.875

## 2023-08-14 ASSESSMENT — PACHYMETRY
OS_CT_UM: 562
OD_CT_UM: 555
OD_CT_CORRECTION: -1
OS_CT_CORRECTION: -1

## 2023-08-14 ASSESSMENT — REFRACTION_AUTOREFRACTION
OS_SPHERE: +1.25
OD_CYLINDER: +0.50
OS_CYLINDER: +1.50
OS_AXIS: 173
OD_SPHERE: +1.75
OD_AXIS: 068

## 2023-08-14 ASSESSMENT — REFRACTION_MANIFEST
OD_AXIS: 180
OS_AXIS: 65
OS_CYLINDER: -0.25
OS_AXIS: 11
OD_VA1: 20/20
OD_ADD: +2.50
OS_CYLINDER: -0.75
OS_VA1: 20/25
OS_SPHERE: +2.00
OD_VA1: 20/25
OD_CYLINDER: -0.75
OS_ADD: +2.50
OS_VA1: 20/20
OD_ADD: +2.50
OS_ADD: +2.50
OD_CYLINDER: -1.00
OD_AXIS: 178
OS_SPHERE: +2.25
OD_SPHERE: +2.50
OD_SPHERE: +2.25

## 2023-08-14 ASSESSMENT — AXIALLENGTH_DERIVED
OD_AL: 24.0673
OD_AL: 23.9162
OD_AL: 23.9664
OS_AL: 24.257
OS_AL: 24.257
OS_AL: 24.2057

## 2023-08-14 ASSESSMENT — KERATOMETRY
OD_K1POWER_DIOPTERS: 40.25
OD_K2POWER_DIOPTERS: 40.50
OS_K2POWER_DIOPTERS: 40.25
OS_AXISANGLE_DEGREES: 162
METHOD_AUTO_MANUAL: AUTO
OD_AXISANGLE_DEGREES: 092
OS_K1POWER_DIOPTERS: 39.25

## 2023-08-14 ASSESSMENT — SUPERFICIAL PUNCTATE KERATITIS (SPK)
OD_SPK: 1+
OS_SPK: 1+

## 2023-08-14 ASSESSMENT — VISUAL ACUITY
OS_BCVA: 20/25+2
OD_BCVA: 20/25--2

## 2023-08-14 ASSESSMENT — LID EXAM ASSESSMENTS
OS_BLEPHARITIS: LUL T
OD_BLEPHARITIS: RUL T

## 2023-08-14 ASSESSMENT — TONOMETRY
OS_IOP_MMHG: 15
OD_IOP_MMHG: 15

## 2023-08-21 PROCEDURE — 90834 PSYTX W PT 45 MINUTES: CPT | Mod: 95

## 2023-08-23 PROCEDURE — 99214 OFFICE O/P EST MOD 30 MIN: CPT

## 2023-08-28 ENCOUNTER — OFFICE (OUTPATIENT)
Dept: URBAN - METROPOLITAN AREA CLINIC 35 | Facility: CLINIC | Age: 78
Setting detail: OPHTHALMOLOGY
End: 2023-08-28
Payer: MEDICARE

## 2023-08-28 DIAGNOSIS — H02.825: ICD-10-CM

## 2023-08-28 PROCEDURE — 92012 INTRM OPH EXAM EST PATIENT: CPT | Performed by: OPHTHALMOLOGY

## 2023-08-28 ASSESSMENT — REFRACTION_CURRENTRX
OS_VPRISM_DIRECTION: BF
OS_AXIS: 0
OD_ADD: +2.75
OD_VPRISM_DIRECTION: BF
OS_ADD: +2.75
OD_OVR_VA: 20/
OD_SPHERE: +1.00
OD_CYLINDER: +1.00
OS_SPHERE: +1.50
OS_OVR_VA: 20/
OD_AXIS: 081
OS_CYLINDER: SPHERE

## 2023-08-28 ASSESSMENT — REFRACTION_MANIFEST
OS_CYLINDER: -0.75
OS_AXIS: 11
OS_ADD: +2.50
OS_ADD: +2.50
OD_VA1: 20/25
OD_ADD: +2.50
OD_CYLINDER: -1.00
OD_VA1: 20/20
OS_VA1: 20/25
OD_ADD: +2.50
OS_AXIS: 65
OS_VA1: 20/20
OD_SPHERE: +2.50
OS_CYLINDER: -0.25
OD_AXIS: 180
OD_CYLINDER: -0.75
OD_SPHERE: +2.25
OD_AXIS: 178
OS_SPHERE: +2.00
OS_SPHERE: +2.25

## 2023-08-28 ASSESSMENT — SPHEQUIV_DERIVED
OD_SPHEQUIV: 2.125
OS_SPHEQUIV: 2
OD_SPHEQUIV: 1.75
OD_SPHEQUIV: 2.25
OS_SPHEQUIV: 1.875
OS_SPHEQUIV: 1.875

## 2023-08-28 ASSESSMENT — KERATOMETRY
OD_AXISANGLE_DEGREES: 092
OS_K2POWER_DIOPTERS: 40.25
OD_K1POWER_DIOPTERS: 40.25
OD_K2POWER_DIOPTERS: 40.50
METHOD_AUTO_MANUAL: AUTO
OS_K1POWER_DIOPTERS: 39.25
OS_AXISANGLE_DEGREES: 162

## 2023-08-28 ASSESSMENT — REFRACTION_AUTOREFRACTION
OD_SPHERE: +2.00
OS_CYLINDER: +1.50
OD_AXIS: 060
OS_SPHERE: +1.25
OS_AXIS: 170
OD_CYLINDER: +0.50

## 2023-08-28 ASSESSMENT — VISUAL ACUITY
OS_BCVA: 20/25-3
OD_BCVA: 20/25-

## 2023-08-28 ASSESSMENT — AXIALLENGTH_DERIVED
OD_AL: 23.8663
OS_AL: 24.2057
OD_AL: 23.9162
OS_AL: 24.257
OS_AL: 24.257
OD_AL: 24.0673

## 2023-08-28 ASSESSMENT — SUPERFICIAL PUNCTATE KERATITIS (SPK)
OS_SPK: 1+
OD_SPK: 1+

## 2023-08-28 ASSESSMENT — LID EXAM ASSESSMENTS
OS_BLEPHARITIS: LUL T
OD_BLEPHARITIS: RUL T

## 2023-08-31 ENCOUNTER — APPOINTMENT (OUTPATIENT)
Dept: ORTHOPEDIC SURGERY | Facility: CLINIC | Age: 78
End: 2023-08-31
Payer: MEDICARE

## 2023-08-31 ENCOUNTER — OFFICE (OUTPATIENT)
Dept: URBAN - METROPOLITAN AREA CLINIC 35 | Facility: CLINIC | Age: 78
Setting detail: OPHTHALMOLOGY
End: 2023-08-31
Payer: MEDICARE

## 2023-08-31 DIAGNOSIS — H00.14: ICD-10-CM

## 2023-08-31 PROBLEM — H10.9 CONJUNCTIVITIS, UNSPECIFIED ; BOTH EYES: Status: RESOLVED | Noted: 2023-08-28 | Resolved: 2023-08-31

## 2023-08-31 PROBLEM — H01.001 BLEPHARITIS; RIGHT UPPER LID, LEFT UPPER LID,: Status: ACTIVE | Noted: 2023-08-14

## 2023-08-31 PROBLEM — H02.825 LID LESION; LEFT LOWER LID: Status: ACTIVE | Noted: 2023-08-14

## 2023-08-31 PROBLEM — H01.004 BLEPHARITIS; RIGHT UPPER LID, LEFT UPPER LID,: Status: ACTIVE | Noted: 2023-08-14

## 2023-08-31 PROCEDURE — 92012 INTRM OPH EXAM EST PATIENT: CPT | Performed by: OPHTHALMOLOGY

## 2023-08-31 PROCEDURE — 99213 OFFICE O/P EST LOW 20 MIN: CPT

## 2023-08-31 ASSESSMENT — SPHEQUIV_DERIVED
OS_SPHEQUIV: 1.875
OD_SPHEQUIV: 2.125
OS_SPHEQUIV: 1.875
OD_SPHEQUIV: 1.75
OS_SPHEQUIV: 2
OD_SPHEQUIV: 1.625

## 2023-08-31 ASSESSMENT — REFRACTION_AUTOREFRACTION
OS_SPHERE: +1.50
OD_CYLINDER: +1.25
OS_AXIS: 161
OD_SPHERE: +1.00
OS_CYLINDER: +1.00
OD_AXIS: 085

## 2023-08-31 ASSESSMENT — SUPERFICIAL PUNCTATE KERATITIS (SPK)
OS_SPK: 1+
OD_SPK: 1+

## 2023-08-31 ASSESSMENT — REFRACTION_MANIFEST
OD_VA1: 20/20
OD_AXIS: 178
OD_AXIS: 180
OD_SPHERE: +2.50
OS_CYLINDER: -0.75
OD_ADD: +2.50
OS_AXIS: 65
OS_SPHERE: +2.25
OS_VA1: 20/25
OS_CYLINDER: -0.25
OD_VA1: 20/25
OD_CYLINDER: -1.00
OS_ADD: +2.50
OS_SPHERE: +2.00
OS_ADD: +2.50
OS_VA1: 20/20
OS_AXIS: 11
OD_SPHERE: +2.25
OD_ADD: +2.50
OD_CYLINDER: -0.75

## 2023-08-31 ASSESSMENT — REFRACTION_CURRENTRX
OD_CYLINDER: +1.00
OS_CYLINDER: SPHERE
OD_AXIS: 090
OD_SPHERE: +1.00
OD_ADD: +2.75
OS_SPHERE: +1.50
OD_OVR_VA: 20/
OS_AXIS: 0
OD_VPRISM_DIRECTION: BF
OS_ADD: +2.75
OS_VPRISM_DIRECTION: BF
OS_OVR_VA: 20/

## 2023-08-31 ASSESSMENT — VISUAL ACUITY
OS_BCVA: 20/25
OD_BCVA: 20/25-

## 2023-08-31 ASSESSMENT — LID EXAM ASSESSMENTS
OS_BLEPHARITIS: LUL T
OD_BLEPHARITIS: RUL T

## 2023-08-31 NOTE — ADDENDUM
[FreeTextEntry1] : This note was written by Shailesh Coello on 08/31/2023 acting as scribe for Dr. Perfecto Rodriguez M.D.  I, Perfecto Rodriguez MD, have read and attest that all the information, medical decision making and discharge instructions within are true and accurate.

## 2023-08-31 NOTE — HISTORY OF PRESENT ILLNESS
[Improving] : improving [de-identified] : 78 year old male presents for follow up evaluation of low back pain S/P L4, L5, and S1 Laminectomy and Removal of Extradural Mass 7/12/22. 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 S/P right hip replacement with Dr. Mcclain on 5/31/23. Has been having right sided lower back pain since prior to his hip replacement. The pain radiates down the RLE anteriorly to the feet, with associated numbness/tingling. Pain wakes him from sleep.  Has not been taking medications for the pain. Has been participating with PT for the hip which he states helps his pain.  No fever chills sweats nausea vomiting no bowel or bladder dysfunction, no recent weight loss or gain no night pain. This history is in addition to the intake form that I personally reviewed.

## 2023-08-31 NOTE — DISCUSSION/SUMMARY
[de-identified] : S/P laminectomy doing well. Left hip arthritis. Discussed all options.  Diclofenac PRN. If no better in 2-3 weeks, will obtain MRI.  Going to [place] 9/17.23 All options discussed including rest, medicine, home exercise, acupuncture, Chiropractic care, Physical Therapy, Pain management, and last resort surgery. All questions were answered, all alternatives discussed, and the patient is in complete agreement with the treatment plan which the patient contributed to and discussed with me through the shared decision-making process. Follow-up appointment as instructed. Any issues and the patient will call or come in sooner.

## 2023-08-31 NOTE — PHYSICAL EXAM
[Normal] : Gait: normal [Painter's Sign] : negative Painter's sign [Pronator Drift] : negative pronator drift [SLR] : negative straight leg raise [de-identified] : 5 out of 5 motor strength, sensation is intact and symmetrical full range of motion flexion extension and rotation, full  lumbar range of motion of left hip knees shoulders and elbows (all four extremities), no atrophy, negative straight leg raise, no pathological reflexes, no swelling, normal ambulation, no apparent distress skin is intact, no upper or lower extremity instability, alert and oriented x 3 and normal mood. Normal finger-to nose test.  Right hip pain. [de-identified] : I reviewed, interpreted and clinically correlated the following outside imaging studies,  MR PELVIS BONY ONLY - ORDERED BY: HAYDEE ALMEIDA   PROCEDURE DATE: 03/16/2023    INTERPRETATION: MR PELVIS BONY dated 3/16/2023 8:35 AM  INDICATION: Lower back pain.  COMPARISON: PET/CT dated 9/8/2022.  TECHNIQUE: Multi-sequential, multiplanar MRI imaging of the pelvis with focus on the sacroiliac joints was performed per standard protocol.  FINDINGS:  BONE MARROW: Mild productive changes are seen at the bilateral sacral iliac joints with osseous bridging on the left. No erosive changes seen.. No subchondral marrow edema or erosive changes appreciated within the sacral iliac joints. The pubic symphysis is preserved. There is moderate to severe cartilage loss at the right superior joint space with a small focal subchondral marrow edema at the right superior femoral head. Marginal productive changes are seen bilaterally. Small subchondral cysts are seen in the left axial femoral head region. A rounded focus of increased T1 and T2 signal is appreciated in the posterior L5 vertebra measuring to 1.3 cm compatible with hemangioma. L5 laminectomy with L5-S1 facet arthropathy and small facet effusions.. SYNOVIUM/ JOINT FLUID: Moderate to large right and mild to moderate left hip joint effusion. No fluid appreciated in the sacroiliac joints. TENDONS: Preserved tendons MUSCLES: Preserved musculature NEUROVASCULAR STRUCTURES: Preserved. SUBCUTANEOUS SOFT TISSUES: No soft tissue swelling. INTRAPELVIC SOFT TISSUES: Enlarged prostate invaginating into the inferior urinary bladder. Small right fat-containing inguinal hernia. Scattered colonic diverticula.  IMPRESSION:  1. Severe right and moderate left hip arthrosis. Focus of subchondral marrow edema at the right superior femoral head may reflect an evolving subchondral insufficiency fracture. 2. Mild degenerative changes at the sacroiliac joints, worse on the left without subchondral marrow edema or periarticular/articular erosive change. Findings are similar to prior PET CT imaging  --- End of Report ---

## 2023-09-01 PROCEDURE — 90834 PSYTX W PT 45 MINUTES: CPT

## 2023-09-11 ENCOUNTER — OFFICE (OUTPATIENT)
Dept: URBAN - METROPOLITAN AREA CLINIC 35 | Facility: CLINIC | Age: 78
Setting detail: OPHTHALMOLOGY
End: 2023-09-11
Payer: MEDICARE

## 2023-09-11 DIAGNOSIS — H00.14: ICD-10-CM

## 2023-09-11 PROCEDURE — 92012 INTRM OPH EXAM EST PATIENT: CPT | Performed by: OPHTHALMOLOGY

## 2023-09-11 ASSESSMENT — REFRACTION_MANIFEST
OS_SPHERE: +2.00
OS_SPHERE: +2.25
OD_SPHERE: +2.25
OS_VA1: 20/25
OS_VA1: 20/20
OS_CYLINDER: -0.75
OD_SPHERE: +2.50
OS_CYLINDER: -0.25
OD_CYLINDER: -1.00
OS_ADD: +2.50
OS_AXIS: 65
OD_VA1: 20/25
OD_ADD: +2.50
OS_AXIS: 11
OD_AXIS: 178
OD_AXIS: 180
OS_ADD: +2.50
OD_CYLINDER: -0.75
OD_ADD: +2.50
OD_VA1: 20/20

## 2023-09-11 ASSESSMENT — REFRACTION_CURRENTRX
OS_AXIS: 000
OD_SPHERE: +1.00
OS_VPRISM_DIRECTION: BF
OD_VPRISM_DIRECTION: BF
OD_ADD: +2.75
OS_SPHERE: +1.50
OS_VPRISM_DIRECTION: BF
OS_AXIS: 0
OS_ADD: +2.75
OS_CYLINDER: SPHERE
OD_CYLINDER: +1.00
OD_CYLINDER: +1.00
OD_ADD: +2.75
OS_OVR_VA: 20/
OD_AXIS: 090
OD_SPHERE: +1.00
OD_OVR_VA: 20/
OS_CYLINDER: 0.00
OD_AXIS: 090
OD_OVR_VA: 20/
OS_SPHERE: +1.50
OS_OVR_VA: 20/
OD_VPRISM_DIRECTION: BF
OS_ADD: +2.50

## 2023-09-11 ASSESSMENT — LID EXAM ASSESSMENTS
OD_BLEPHARITIS: RUL T
OS_BLEPHARITIS: LUL T
OS_COMMENTS: UL EVERTED

## 2023-09-11 ASSESSMENT — SPHEQUIV_DERIVED
OD_SPHEQUIV: 2.125
OD_SPHEQUIV: 2.125
OD_SPHEQUIV: 1.75
OS_SPHEQUIV: 1.875

## 2023-09-11 ASSESSMENT — KERATOMETRY
OS_K2POWER_DIOPTERS: 40.00
OS_AXISANGLE_DEGREES: 158
METHOD_AUTO_MANUAL: AUTO
OD_K2POWER_DIOPTERS: 40.25
OD_K1POWER_DIOPTERS: 40.00
OD_AXISANGLE_DEGREES: 089
OS_K1POWER_DIOPTERS: 39.25

## 2023-09-11 ASSESSMENT — REFRACTION_AUTOREFRACTION
OS_AXIS: 160
OS_CYLINDER: +0.75
OS_SPHERE: +1.50
OD_SPHERE: +1.75
OD_AXIS: 071
OD_CYLINDER: +0.75

## 2023-09-11 ASSESSMENT — VISUAL ACUITY
OD_BCVA: 20/20
OS_BCVA: 20/20-2

## 2023-09-11 ASSESSMENT — AXIALLENGTH_DERIVED
OD_AL: 24.011
OD_AL: 24.011
OS_AL: 24.3057
OS_AL: 24.3057
OD_AL: 24.1633
OS_AL: 24.3057

## 2023-09-11 ASSESSMENT — SUPERFICIAL PUNCTATE KERATITIS (SPK)
OD_SPK: 1+
OS_SPK: 1+

## 2023-10-06 ENCOUNTER — APPOINTMENT (OUTPATIENT)
Dept: ORTHOPEDIC SURGERY | Facility: CLINIC | Age: 78
End: 2023-10-06
Payer: MEDICARE

## 2023-10-06 VITALS
BODY MASS INDEX: 24.5 KG/M2 | WEIGHT: 175 LBS | HEART RATE: 62 BPM | DIASTOLIC BLOOD PRESSURE: 79 MMHG | SYSTOLIC BLOOD PRESSURE: 128 MMHG | HEIGHT: 71 IN

## 2023-10-06 PROCEDURE — 99213 OFFICE O/P EST LOW 20 MIN: CPT

## 2023-10-06 PROCEDURE — 73502 X-RAY EXAM HIP UNI 2-3 VIEWS: CPT | Mod: RT

## 2024-01-08 NOTE — BRIEF OPERATIVE NOTE - CO SURGEON
[place]
Detail Level: Detailed
Depth Of Biopsy: dermis
Was A Bandage Applied: Yes
Size Of Lesion In Cm: 0.4
X Size Of Lesion In Cm: 0
Biopsy Type: H and E
Biopsy Method: Dermablade
Anesthesia Type: 1% lidocaine with epinephrine
Anesthesia Volume In Cc: 0.5
Hemostasis: Electrocautery
Wound Care: Aquaphor
Dressing: bandage
Destruction After The Procedure: No
Type Of Destruction Used: Curettage
Curettage Text: The wound bed was treated with curettage after the biopsy was performed.
Cryotherapy Text: The wound bed was treated with cryotherapy after the biopsy was performed.
Electrodesiccation Text: The wound bed was treated with electrodesiccation after the biopsy was performed.
Electrodesiccation And Curettage Text: The wound bed was treated with electrodesiccation and curettage after the biopsy was performed.
Silver Nitrate Text: The wound bed was treated with silver nitrate after the biopsy was performed.
Lab: [id number]
Lab Facility: [id number]
Consent: Written consent was obtained and risks were reviewed including but not limited to scarring, infection, bleeding, scabbing, incomplete removal, nerve damage and allergy to anesthesia.
Post-Care Instructions: I reviewed with the patient in detail post-care instructions. Patient is to keep the biopsy site dry overnight, and then apply bacitracin twice daily until healed. Patient may apply hydrogen peroxide soaks to remove any crusting.
Notification Instructions: Patient will be notified of biopsy results. However, patient instructed to call the office if not contacted within 2 weeks.
Billing Type: Third-Party Bill
Information: Selecting Yes will display possible errors in your note based on the variables you have selected. This validation is only offered as a suggestion for you. PLEASE NOTE THAT THE VALIDATION TEXT WILL BE REMOVED WHEN YOU FINALIZE YOUR NOTE. IF YOU WANT TO FAX A PRELIMINARY NOTE YOU WILL NEED TO TOGGLE THIS TO 'NO' IF YOU DO NOT WANT IT IN YOUR FAXED NOTE.

## 2024-02-21 ENCOUNTER — NON-APPOINTMENT (OUTPATIENT)
Age: 79
End: 2024-02-21

## 2024-03-15 ENCOUNTER — NON-APPOINTMENT (OUTPATIENT)
Age: 79
End: 2024-03-15

## 2024-04-19 ENCOUNTER — NON-APPOINTMENT (OUTPATIENT)
Age: 79
End: 2024-04-19

## 2024-04-19 ENCOUNTER — RESULT REVIEW (OUTPATIENT)
Age: 79
End: 2024-04-19

## 2024-06-12 ENCOUNTER — APPOINTMENT (OUTPATIENT)
Dept: ORTHOPEDIC SURGERY | Facility: CLINIC | Age: 79
End: 2024-06-12
Payer: MEDICARE

## 2024-06-12 VITALS — HEIGHT: 71 IN | BODY MASS INDEX: 24.08 KG/M2 | WEIGHT: 172 LBS

## 2024-06-12 DIAGNOSIS — M48.07 SPINAL STENOSIS, LUMBOSACRAL REGION: ICD-10-CM

## 2024-06-12 PROCEDURE — 99214 OFFICE O/P EST MOD 30 MIN: CPT

## 2024-06-12 NOTE — HISTORY OF PRESENT ILLNESS
[de-identified] : 78 year old male presents for evaluation of right lower back pain x several months.  S/P L4, L5, and S1 Laminectomy and Removal of Extradural Mass 7/12/22. Last seen in Aug 2023 for right sided lower back pain with radiation down the RLE.  He states he has mild radiation of pain down the RLE, mostly has pain at the right lower back pain. Pain comes and goes. Takes advil and aleve PRN for the pain. Has not tried PT or chiropractic care. He underwent a nerve block injection on 5/20/24 in [place] which provided temporary relief. He also went LESI a few months prior but felt worse.  Has MRI Lumbar done in Dec 2023.  PMHx: S/P melanoma surgery of his R shoulder, S/P right hip replacement with Dr. Mcclain in May 2023.  [Stable] : stable

## 2024-06-12 NOTE — DISCUSSION/SUMMARY
[de-identified] : S/P L4, L5, and S1 Laminectomy and Removal of Extradural Mass 7/12/22. Right hip pain  Discussed all options. Seeing  tomorrow to asses right hip F/U after evaluation  Discussed GT injections All options discussed including rest, medicine, home exercise, acupuncture, Chiropractic care, Physical Therapy, Pain management, and last resort surgery. All questions were answered, all alternatives discussed and the patient is in complete agreement with the treatment plan which the patient contributed to and discussed with me through the shared decision making process. Follow-up appointment as instructed. Any issues and the patient will call or come in sooner.

## 2024-06-12 NOTE — ADDENDUM
[FreeTextEntry1] : This note was written by Yari Bello on 06/12/2024 acting as scribe for Dr. Perfecto Rodriguez M.D.   I, Perfecto Rodriguez MD, have read and attest that all the information, medical decision making and discharge instructions within are true and accurate.

## 2024-06-12 NOTE — PHYSICAL EXAM
[Normal] : Gait: normal [Painter's Sign] : negative Painter's sign [Pronator Drift] : negative pronator drift [SLR] : negative straight leg raise [de-identified] : 5 out of 5 motor strength, sensation is intact and symmetrical full range of motion flexion extension and rotation, no palpatory tenderness full range of motion of hips knees shoulders and elbows (all four extremities), no atrophy, negative straight leg raise, no pathological reflexes, no swelling, normal ambulation, no apparent distress skin is intact, no palpable lymph nodes, no upper or lower extremity instability, alert and oriented x3 and normal mood. Normal finger-to nose test.  Right hip pain. [de-identified] :  I reviewed, interpreted and clinically correlated the following outside imaging studies, MRI Lumbar 12/29/23 ([facility], [place]) - degenerative spondylosis. Post treatment changes of laminectomy at L4-5 and L5-S1. Lesion in L5, may represent atypical hemangioma.   MR PELVIS BONY ONLY - ORDERED BY: HAYDEE ALMEIDA   PROCEDURE DATE: 03/16/2023    INTERPRETATION: MR PELVIS BONY dated 3/16/2023 8:35 AM  INDICATION: Lower back pain.  COMPARISON: PET/CT dated 9/8/2022.  TECHNIQUE: Multi-sequential, multiplanar MRI imaging of the pelvis with focus on the sacroiliac joints was performed per standard protocol.  FINDINGS:  BONE MARROW: Mild productive changes are seen at the bilateral sacral iliac joints with osseous bridging on the left. No erosive changes seen.. No subchondral marrow edema or erosive changes appreciated within the sacral iliac joints. The pubic symphysis is preserved. There is moderate to severe cartilage loss at the right superior joint space with a small focal subchondral marrow edema at the right superior femoral head. Marginal productive changes are seen bilaterally. Small subchondral cysts are seen in the left axial femoral head region. A rounded focus of increased T1 and T2 signal is appreciated in the posterior L5 vertebra measuring to 1.3 cm compatible with hemangioma. L5 laminectomy with L5-S1 facet arthropathy and small facet effusions.. SYNOVIUM/ JOINT FLUID: Moderate to large right and mild to moderate left hip joint effusion. No fluid appreciated in the sacroiliac joints. TENDONS: Preserved tendons MUSCLES: Preserved musculature NEUROVASCULAR STRUCTURES: Preserved. SUBCUTANEOUS SOFT TISSUES: No soft tissue swelling. INTRAPELVIC SOFT TISSUES: Enlarged prostate invaginating into the inferior urinary bladder. Small right fat-containing inguinal hernia. Scattered colonic diverticula.  IMPRESSION:  1. Severe right and moderate left hip arthrosis. Focus of subchondral marrow edema at the right superior femoral head may reflect an evolving subchondral insufficiency fracture. 2. Mild degenerative changes at the sacroiliac joints, worse on the left without subchondral marrow edema or periarticular/articular erosive change. Findings are similar to prior PET CT imaging  --- End of Report ---.

## 2024-06-13 ENCOUNTER — APPOINTMENT (OUTPATIENT)
Dept: ORTHOPEDIC SURGERY | Facility: CLINIC | Age: 79
End: 2024-06-13
Payer: MEDICARE

## 2024-06-13 ENCOUNTER — APPOINTMENT (OUTPATIENT)
Dept: SURGICAL ONCOLOGY | Facility: CLINIC | Age: 79
End: 2024-06-13
Payer: MEDICARE

## 2024-06-13 VITALS
HEIGHT: 71 IN | BODY MASS INDEX: 24.08 KG/M2 | SYSTOLIC BLOOD PRESSURE: 126 MMHG | OXYGEN SATURATION: 98 % | HEART RATE: 70 BPM | DIASTOLIC BLOOD PRESSURE: 65 MMHG | WEIGHT: 172 LBS

## 2024-06-13 VITALS — WEIGHT: 172 LBS | HEIGHT: 71 IN | BODY MASS INDEX: 24.08 KG/M2

## 2024-06-13 DIAGNOSIS — M51.36 OTHER INTERVERTEBRAL DISC DEGENERATION, LUMBAR REGION: ICD-10-CM

## 2024-06-13 DIAGNOSIS — Z96.641 PRESENCE OF RIGHT ARTIFICIAL HIP JOINT: ICD-10-CM

## 2024-06-13 DIAGNOSIS — C43.61 MALIGNANT MELANOMA OF RIGHT UPPER LIMB, INCLUDING SHOULDER: ICD-10-CM

## 2024-06-13 DIAGNOSIS — M16.11 UNILATERAL PRIMARY OSTEOARTHRITIS, RIGHT HIP: ICD-10-CM

## 2024-06-13 PROCEDURE — 99214 OFFICE O/P EST MOD 30 MIN: CPT | Mod: 25

## 2024-06-13 PROCEDURE — 99213 OFFICE O/P EST LOW 20 MIN: CPT

## 2024-06-13 PROCEDURE — 20610 DRAIN/INJ JOINT/BURSA W/O US: CPT | Mod: RT

## 2024-06-13 PROCEDURE — 73502 X-RAY EXAM HIP UNI 2-3 VIEWS: CPT | Mod: RT

## 2024-06-13 NOTE — HISTORY OF PRESENT ILLNESS
[de-identified] : This is very nice 78-year-old gentleman experiencing right buttock and right lateral hip pain, which is severe in intensity.  History of right total hip arthroplasty doing very well from that.  No groin pain.  Thrilled with the outcome of that.  This is a new pain for him.  He has known spinal stenosis.  Has been recommended for epidural steroid injections in the past and did not follow through these.  Not using cane or walker.

## 2024-06-13 NOTE — PHYSICAL EXAM
[de-identified] : Patient is well nourished, well-developed, in no acute distress, with appropriate mood and affect. The patient is oriented to time, place, and person. Respirations are even and unlabored. Gait evaluation does not reveal a limp. There is no inguinal adenopathy. Examination of the contralateral hip shows normal range of motion, strength, no tenderness, and intact skin. The affected limb is well-perfused and showed 2+ dp/pt pulses, without skin lesions, shows a grossly normal motor and sensory examination. Examination of the hip shows a well healed surgical scar. Hip motion is full and painless from 0-90 degrees extension to flexion, 20 degrees adduction and 20 degrees abduction, and 15 degrees internal and 30 degrees external rotation. Leg lengths are approximately equal. Both hips are stable and muscle strength is normal with good strength with resisted abduction and adduction. Pedal pulses are palpable.  Tender to palpation over the lateral aspect of the right hip.  [de-identified] : AP pelvis, AP and lateral hip radiographs of the right hip were ordered and obtained in the office and demonstrate satisfactory position and alignment of the components are present. No signs of loosening are seen.  This patient brings with him an MRI of the right hip.  I reviewed the MR imaging with the patient which demonstrates right total of arthroplasty that appears to be well-fixed and without failure.

## 2024-06-13 NOTE — DISCUSSION/SUMMARY
[de-identified] : This patient has a well-functioning right total of arthroplasty with new diagnosis of right hip trochanteric bursitis.  The patient is not an appropriate candidate for surgical intervention at this time. An extensive discussion was conducted on the natural history of the disease and the variety of surgical and non-surgical options available to the patient including, but not limited to non-steroidal anti-inflammatory medications, steroid injections, physical therapy, maintenance of ideal body weight, and reduction of activity.  Today we performed right trochanteric bursa injection. The patient will schedule an appointment as needed.  Informed consent for the right hip trochanteric bursa injection was obtained. All risks, benefits and alternatives were discussed. These included but were not limited to bleeding, infection, and allergic reaction.  All questions were answered.  A time out was performed. The right lateral hip was prepped and draped in sterile fashion. Using sterile technique, the right greater trochanter was injection with 80mg of Kenalog and 4cc of 0.25% marcaine using a 21-gauge needle. A sterile dressing was applied. Post injection instructions were reviewed. The patient reported relief of symptoms after the injection. The patient tolerated the procedure well.

## 2024-06-14 ENCOUNTER — APPOINTMENT (OUTPATIENT)
Dept: CT IMAGING | Facility: CLINIC | Age: 79
End: 2024-06-14
Payer: MEDICARE

## 2024-06-14 ENCOUNTER — RX ONLY (RX ONLY)
Age: 79
End: 2024-06-14

## 2024-06-14 ENCOUNTER — OUTPATIENT (OUTPATIENT)
Dept: OUTPATIENT SERVICES | Facility: HOSPITAL | Age: 79
LOS: 1 days | End: 2024-06-14
Payer: MEDICARE

## 2024-06-14 ENCOUNTER — OFFICE (OUTPATIENT)
Dept: URBAN - METROPOLITAN AREA CLINIC 77 | Facility: CLINIC | Age: 79
Setting detail: OPHTHALMOLOGY
End: 2024-06-14
Payer: MEDICARE

## 2024-06-14 DIAGNOSIS — Z98.890 OTHER SPECIFIED POSTPROCEDURAL STATES: Chronic | ICD-10-CM

## 2024-06-14 DIAGNOSIS — H40.013: ICD-10-CM

## 2024-06-14 DIAGNOSIS — Z90.5 ACQUIRED ABSENCE OF KIDNEY: Chronic | ICD-10-CM

## 2024-06-14 DIAGNOSIS — H35.3131: ICD-10-CM

## 2024-06-14 DIAGNOSIS — N28.89 OTHER SPECIFIED DISORDERS OF KIDNEY AND URETER: ICD-10-CM

## 2024-06-14 PROBLEM — H35.3121 AGE RELATED MACULAR DEGENERATION DRY; RIGHT EYE EARLY, LEFT EYE EARLY: Status: ACTIVE | Noted: 2024-06-14

## 2024-06-14 PROBLEM — H35.3111 AGE RELATED MACULAR DEGENERATION DRY; RIGHT EYE EARLY, LEFT EYE EARLY: Status: ACTIVE | Noted: 2024-06-14

## 2024-06-14 PROCEDURE — 92250 FUNDUS PHOTOGRAPHY W/I&R: CPT | Performed by: OPHTHALMOLOGY

## 2024-06-14 PROCEDURE — 99214 OFFICE O/P EST MOD 30 MIN: CPT | Performed by: OPHTHALMOLOGY

## 2024-06-14 PROCEDURE — 74170 CT ABD WO CNTRST FLWD CNTRST: CPT | Mod: 26,MH

## 2024-06-14 PROCEDURE — 74170 CT ABD WO CNTRST FLWD CNTRST: CPT

## 2024-06-14 PROCEDURE — 92083 EXTENDED VISUAL FIELD XM: CPT | Performed by: OPHTHALMOLOGY

## 2024-06-14 ASSESSMENT — LID EXAM ASSESSMENTS
OD_BLEPHARITIS: RUL T
OS_COMMENTS: UL EVERTED
OS_BLEPHARITIS: LUL T

## 2024-06-14 ASSESSMENT — CONFRONTATIONAL VISUAL FIELD TEST (CVF)
OS_FINDINGS: FULL
OD_FINDINGS: FULL

## 2024-06-15 NOTE — HISTORY OF PRESENT ILLNESS
[de-identified] : Jaquan is a 71 year old male presents to office for 3 month follow up visit. 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 Final pathology revealed a [id number], 0.31 mm in thickness, negative margins, no residual neoplasm. Today he is without any complaints. Denies new lesions or masses. Denies bleeding or pruritic moles. Denies pain or constitutional symptoms. He continues ongoing dermatologic surveillance with Dr. Antione Vazquez and reports exam last Tuesday was unremarkable. Continues to avoid sun and applies sunblock.   Chest Xray 7/13/16: no evidence of disease.  --------------------------------------------------------------------------------------------------- 04/17/2018: 73 y/o male with history of 0.31 mm thick melanoma excised from the right shoulder three years ago presents for follow up.  Currently distressed as he had biopsy of cutaneous lesion adjacent to scar of right shoulder revealing an at least 0.8 mm thick melanoma.  He has been referred back to us by his dermatologist for surgical treatment. Patient denies pain at biopsy site.  He states he had another biopsy of the right back which revealed basal cell cancer to be treated by Dr. Vazquez.  05/22/2018:  Patient is s/p wide excision of right shoulder melanoma and right axillary sentinel lymph node biopsy 05/09/2018.  Recovering well. Pathology: 0.8 mm melanoma, margins negative.  Three right axillary sentinel lymph nodes negative.  08/21/2018:  Patient presents for 6 months follow up.  Feels well.  Denies new cutaneous malignancies.  He continues to follow up with dermatology.  He denies pain, swelling or new nodularity at right shoulder excision site and right arm/axilla.  08/30/2022: Patient recently underwent biopsy of a right shoulder cutaneous lesion 08/22/2022.  This is in the region of the previously resected right shoulder melanoma 05/2018. Pathology: fragments of malignant melanoma for which metastatic melanoma versus 0.6 mm thick melanoma could not be differentiated on biopsy specimen.  09/27/2022: Patient is s/p excision of right shoulder melanoma 09/14/2022.  He is recovering well and denies pain. Pathology has not resulted  03/28/2023: Patient reports doing well and denies pain at operative site of right shoulder.  He has had dermatology visits and has no new malignant biopsies.  06/13/2024: Patient presents for routine melanoma follow up.  He is doing well and denies new melanoma diagnosis since his last visit.  There is no pain/swelling/new mass at right shoulder excision site.

## 2024-06-15 NOTE — ASSESSMENT
[FreeTextEntry1] : No evidence of recurrence of right shoulder melanoma. To continue dermatology follow up. May return to office as clinically indicated.

## 2024-06-15 NOTE — PHYSICAL EXAM
[FreeTextEntry1] : Right shoulder incision healed well without evidence of local recurrence.  No palpable axillary/cervical adenopathy.

## 2024-06-25 ENCOUNTER — APPOINTMENT (OUTPATIENT)
Dept: UROLOGY | Facility: CLINIC | Age: 79
End: 2024-06-25
Payer: MEDICARE

## 2024-06-25 VITALS — SYSTOLIC BLOOD PRESSURE: 149 MMHG | TEMPERATURE: 98.7 F | HEART RATE: 56 BPM | DIASTOLIC BLOOD PRESSURE: 64 MMHG

## 2024-06-25 DIAGNOSIS — N41.0 ACUTE PROSTATITIS: ICD-10-CM

## 2024-06-25 DIAGNOSIS — N28.89 OTHER SPECIFIED DISORDERS OF KIDNEY AND URETER: ICD-10-CM

## 2024-06-25 PROCEDURE — 99214 OFFICE O/P EST MOD 30 MIN: CPT

## 2024-06-25 RX ORDER — TAMSULOSIN HYDROCHLORIDE 0.4 MG/1
0.4 CAPSULE ORAL
Refills: 0 | Status: ACTIVE | COMMUNITY

## 2024-06-25 RX ORDER — AMLODIPINE BESYLATE 5 MG/1
5 TABLET ORAL
Refills: 0 | Status: ACTIVE | COMMUNITY

## 2024-06-25 RX ORDER — METOPROLOL TARTRATE 50 MG/1
50 TABLET, FILM COATED ORAL
Refills: 0 | Status: ACTIVE | COMMUNITY

## 2024-06-25 RX ORDER — SERTRALINE HYDROCHLORIDE 25 MG/1
TABLET, FILM COATED ORAL
Refills: 0 | Status: ACTIVE | COMMUNITY

## 2024-06-25 RX ORDER — FINASTERIDE 5 MG/1
5 TABLET, FILM COATED ORAL
Refills: 0 | Status: ACTIVE | COMMUNITY

## 2024-06-25 RX ORDER — ROSUVASTATIN CALCIUM 5 MG/1
5 TABLET, FILM COATED ORAL
Refills: 0 | Status: ACTIVE | COMMUNITY

## 2024-06-25 RX ORDER — LEVOTHYROXINE SODIUM 137 UG/1
TABLET ORAL
Refills: 0 | Status: ACTIVE | COMMUNITY

## 2024-08-26 NOTE — ASU PATIENT PROFILE, ADULT - NS SC CAGE ALCOHOL EYE OPENER
-- DO NOT REPLY / DO NOT REPLY ALL --  -- This inbox is not monitored. If this was sent to the wrong provider or department, reroute message to P ECO Reroute pool. --  -- Message is from [facility] [employer] ([facility]O) --    General Patient Message: Karine is calling back will like to know if referral will be updated with correct CPT code 18276 ; diagnosis code of C67.9     Caller Information       Contact Date/Time Type Contact Phone/Fax    08/22/2024 10:32 AM CDT Phone (Incoming) Karine GUILLORY (Other) [phone]    08/26/2024 01:24 PM CDT Phone (Incoming) Karine [phone]     ex [phone]            Alternative phone number: none    Can a detailed message be left? Yes - Voicemail      Patient has been advised the message will be addressed within 2-3 business days.            
-- DO NOT REPLY / DO NOT REPLY ALL --  -- This inbox is not monitored. If this was sent to the wrong provider or department, reroute message to P Tryoutsoute pool. --  -- Message is from [facility] [employer] ([facility]) --    General Patient Message:  is calling in regarding the Urology referral she states it will need to be updated with the CPT code 76638 ; diagnosis code is C67.9 fax number is [phone]    Caller Information       Contact Date/Time Type Contact Phone/Fax    08/22/2024 10:32 AM CDT Phone (Incoming) Karine GUILLORY (Other) [phone]            Alternative phone number:     Can a detailed message be left? Yes - Voicemail    {Please give the turnaround time to the caller -  \"This message will be sent to [state Provider's first and last name].  You can expect to receive a response within 2-3 business days from your provider's clinical team. Please be aware the return phone call may come from an unidentified or out of state phone number.\":    Patient has been advised the message will be addressed within 2-3 business days.            
Can you please update referral   
Pt chose to seek urology services [facility], referral cannot be provided. PA for procedure should be completed by rendering facility.  
no

## 2024-10-03 NOTE — H&P PST ADULT - ITE SK HX ROS MEA POS PC
10/03/24 [facility] Discharge Instructions    I will be responsible for sharing my medication information including changes as listed below with all my physicians.  I WILL NOT DRIVE OR OPERATE MACHINERY TODAY  I WILL NOT DRINK ALCOHOLIC BEVERAGES TODAY  I WILL AVOID MAKING ANY IMPORTANT DECISIONS TODAY    Please check the applicable procedure and the appropriate instructions.  _x_ Colonoscopy _x_EGD (Gastroscopy) __ Flexible Sigmoidoscopy   __Esophageal Dilatation __ ERCP    __Other: _________________________    You have received the following medications:  __ Demerol __ Versed __Propofol __Fentanyl __Valium __Other:________________    Diet:  _x_ You may eat or drink normally now. However please allow your system to readjust by eating light at your next meal. You may then resume your normal diet eating lightly unless otherwise noted by your physician  __ Do not eat or drink anything for 2 hours. After 2 hours you may take sips of water. In addition, if you feel the water as you swallow you may advance to your regular diet  __ Diet Change:___________________________________________    Post Procedure Instructions:  _x_ You may not remember the endoscopy or the doctor’s explanation of what your exam showed. This is a normal reaction to the medications you may have been given. Today you should plan to go home & rest.  __You may have soreness at the IV site. If this occurs, apply warm compress to the area. If the area becomes red, swollen, has drainage, or becomes painful, call your GI physician  __ If you have a sore throat, throat lozenges or gargles help to relieve discomfort  __ You may have some cramping or gas after the procedure. If you do, try mild activity to expel the air    Call physician’s office to:  __xObtain results of procedure/biopsy from your GI physician  __Polyps were removed. You may have a small amount of rectal bleeding. If greater  than a few drops of blood, call your physician  _x_  Report any signs of severe pain, fever, nausea, vomiting, diarrhea, difficulty in   swallowing, shortness of breath or persistent bloating to your GI physician.       If you experience an emergency situation and you are unable to reach your physician, go to the emergency department or call [phone] and ask for the emergency room.    change in size/color of mole

## 2024-12-23 ENCOUNTER — OFFICE (OUTPATIENT)
Dept: URBAN - METROPOLITAN AREA CLINIC 77 | Facility: CLINIC | Age: 79
Setting detail: OPHTHALMOLOGY
End: 2024-12-23
Payer: MEDICARE

## 2024-12-23 DIAGNOSIS — H01.004: ICD-10-CM

## 2024-12-23 DIAGNOSIS — H16.223: ICD-10-CM

## 2024-12-23 DIAGNOSIS — H43.813: ICD-10-CM

## 2024-12-23 DIAGNOSIS — H02.825: ICD-10-CM

## 2024-12-23 DIAGNOSIS — H25.13: ICD-10-CM

## 2024-12-23 DIAGNOSIS — H40.013: ICD-10-CM

## 2024-12-23 DIAGNOSIS — H35.3121: ICD-10-CM

## 2024-12-23 DIAGNOSIS — H35.033: ICD-10-CM

## 2024-12-23 DIAGNOSIS — H01.001: ICD-10-CM

## 2024-12-23 DIAGNOSIS — H35.3111: ICD-10-CM

## 2024-12-23 PROCEDURE — 92202 OPSCPY EXTND ON/MAC DRAW: CPT | Performed by: OPHTHALMOLOGY

## 2024-12-23 PROCEDURE — 99214 OFFICE O/P EST MOD 30 MIN: CPT | Performed by: OPHTHALMOLOGY

## 2024-12-23 PROCEDURE — 92083 EXTENDED VISUAL FIELD XM: CPT | Performed by: OPHTHALMOLOGY

## 2024-12-23 PROCEDURE — 92133 CPTRZD OPH DX IMG PST SGM ON: CPT | Performed by: OPHTHALMOLOGY

## 2024-12-23 ASSESSMENT — REFRACTION_CURRENTRX
OS_VPRISM_DIRECTION: BF
OS_CYLINDER: SPHERE
OS_OVR_VA: 20/
OD_VPRISM_DIRECTION: BF
OD_SPHERE: +1.00
OD_AXIS: 090
OD_SPHERE: +1.00
OD_ADD: +2.75
OS_AXIS: 0
OD_CYLINDER: +1.00
OS_ADD: +2.50
OD_ADD: +2.75
OS_SPHERE: +1.50
OD_CYLINDER: +1.00
OD_OVR_VA: 20/
OD_OVR_VA: 20/
OS_OVR_VA: 20/
OS_ADD: +2.75
OS_SPHERE: +1.50
OD_AXIS: 090
OS_AXIS: 000
OS_CYLINDER: 0.00
OS_VPRISM_DIRECTION: BF
OD_VPRISM_DIRECTION: BF

## 2024-12-23 ASSESSMENT — REFRACTION_MANIFEST
OS_VA1: 20/25
OS_VA1: 20/20
OD_SPHERE: +2.25
OS_ADD: +2.50
OD_VA1: 20/25
OS_CYLINDER: -0.25
OD_SPHERE: +2.50
OS_AXIS: 65
OD_VA1: 20/20
OS_SPHERE: +2.00
OD_ADD: +2.50
OD_CYLINDER: -1.00
OS_CYLINDER: -0.75
OS_AXIS: 11
OD_CYLINDER: -0.75
OS_SPHERE: +2.25
OD_ADD: +2.50
OD_AXIS: 180
OD_AXIS: 178
OS_ADD: +2.50

## 2024-12-23 ASSESSMENT — REFRACTION_AUTOREFRACTION
OS_SPHERE: +1.50
OD_CYLINDER: +0.75
OD_AXIS: 071
OS_CYLINDER: +0.75
OD_SPHERE: +1.75
OS_AXIS: 160

## 2024-12-23 ASSESSMENT — SUPERFICIAL PUNCTATE KERATITIS (SPK)
OS_SPK: 1+
OD_SPK: 1+

## 2024-12-23 ASSESSMENT — CONFRONTATIONAL VISUAL FIELD TEST (CVF)
OD_FINDINGS: FULL
OS_FINDINGS: FULL

## 2024-12-23 ASSESSMENT — LID EXAM ASSESSMENTS
OS_COMMENTS: UL EVERTED
OS_BLEPHARITIS: LUL T
OD_BLEPHARITIS: RUL T

## 2024-12-23 ASSESSMENT — KERATOMETRY
OS_K2POWER_DIOPTERS: 40.00
OS_K1POWER_DIOPTERS: 39.25
OD_AXISANGLE_DEGREES: 089
METHOD_AUTO_MANUAL: AUTO
OD_K1POWER_DIOPTERS: 40.00
OD_K2POWER_DIOPTERS: 40.25
OS_AXISANGLE_DEGREES: 158

## 2024-12-23 ASSESSMENT — VISUAL ACUITY
OS_BCVA: 20/25-2
OD_BCVA: 20/20-1

## 2025-02-11 NOTE — DISCHARGE NOTE PROVIDER - REASON FOR ADMISSION
<-- Click to add NO significant Past Surgical History L4-5 lami/ L3-4 ISF 7/12/22 LL4-5 lami/ cyst excision7/12/22

## 2025-04-10 ENCOUNTER — NON-APPOINTMENT (OUTPATIENT)
Age: 80
End: 2025-04-10

## 2025-04-26 NOTE — ASU PREOP CHECKLIST - NS PREOP CHK HIBICLENS NA
1617: Called code stroke  1625: to CT scan  1640: Back in the room    Patient is with his wife    Rounding Completed    Plan of Care reviewed. Waiting for imaging  Elimination needs assessed.  Provided warm blanket    Bed is locked and in lowest position. Call light within reach.   N/A

## 2025-06-10 ENCOUNTER — NON-APPOINTMENT (OUTPATIENT)
Age: 80
End: 2025-06-10

## 2025-06-12 ENCOUNTER — APPOINTMENT (OUTPATIENT)
Dept: ORTHOPEDIC SURGERY | Facility: CLINIC | Age: 80
End: 2025-06-12
Payer: MEDICARE

## 2025-06-12 VITALS
DIASTOLIC BLOOD PRESSURE: 71 MMHG | HEIGHT: 71 IN | BODY MASS INDEX: 24.78 KG/M2 | OXYGEN SATURATION: 99 % | WEIGHT: 177 LBS | SYSTOLIC BLOOD PRESSURE: 126 MMHG | HEART RATE: 60 BPM

## 2025-06-12 PROCEDURE — 99214 OFFICE O/P EST MOD 30 MIN: CPT

## 2025-06-12 NOTE — DISCUSSION/SUMMARY
[de-identified] : S/P L4, L5, and S1 Laminectomy and Removal of Extradural Mass 7/12/22. Discussed all options. Right trochanteric bursitis.  Getting MRI lumbar.  Discussed injection right trochanteric region.  All options discussed including rest, medicine, home exercise, acupuncture, Chiropractic care, Physical Therapy, Pain management, and last resort surgery. All questions were answered, all alternatives discussed and the patient is in complete agreement with the treatment plan which the patient contributed to and discussed with me through the shared decision making process. Follow-up appointment as instructed. Any issues and the patient will call or come in sooner.

## 2025-06-12 NOTE — PHYSICAL EXAM
[Normal] : Gait: normal [Jeronimo's Sign] : negative Jeronimo's sign [Pronator Drift] : negative pronator drift [SLR] : negative straight leg raise [de-identified] : 5 out of 5 motor strength, sensation is intact and symmetrical full range of motion flexion extension and rotation, no palpatory tenderness full range of motion of hips knees shoulders and elbows (all four extremities), no atrophy, negative straight leg raise, no pathological reflexes, no swelling, normal ambulation, no apparent distress skin is intact, no palpable lymph nodes, no upper or lower extremity instability, alert and oriented x3 and normal mood. Normal finger-to nose test.  Pain over right troch region.  [de-identified] :  I reviewed, interpreted and clinically correlated the following outside imaging studies, MRI Lumbar 12/29/23 ([facility], [place]) - degenerative spondylosis. Post treatment changes of laminectomy at L4-5 and L5-S1. Lesion in L5, may represent atypical hemangioma.  MR PELVIS BONY ONLY - ORDERED BY: ARON CRUZ   PROCEDURE DATE: 03/16/2023    INTERPRETATION: MR PELVIS BONY dated 3/16/2023 8:35 AM  INDICATION: Lower back pain.  COMPARISON: PET/CT dated 9/8/2022.  TECHNIQUE: Multi-sequential, multiplanar MRI imaging of the pelvis with focus on the sacroiliac joints was performed per standard protocol.  FINDINGS:  BONE MARROW: Mild productive changes are seen at the bilateral sacral iliac joints with osseous bridging on the left. No erosive changes seen.. No subchondral marrow edema or erosive changes appreciated within the sacral iliac joints. The pubic symphysis is preserved. There is moderate to severe cartilage loss at the right superior joint space with a small focal subchondral marrow edema at the right superior femoral head. Marginal productive changes are seen bilaterally. Small subchondral cysts are seen in the left axial femoral head region. A rounded focus of increased T1 and T2 signal is appreciated in the posterior L5 vertebra measuring to 1.3 cm compatible with hemangioma. L5 laminectomy with L5-S1 facet arthropathy and small facet effusions.. SYNOVIUM/ JOINT FLUID: Moderate to large right and mild to moderate left hip joint effusion. No fluid appreciated in the sacroiliac joints. TENDONS: Preserved tendons MUSCLES: Preserved musculature NEUROVASCULAR STRUCTURES: Preserved. SUBCUTANEOUS SOFT TISSUES: No soft tissue swelling. INTRAPELVIC SOFT TISSUES: Enlarged prostate invaginating into the inferior urinary bladder. Small right fat-containing inguinal hernia. Scattered colonic diverticula.  IMPRESSION:  1. Severe right and moderate left hip arthrosis. Focus of subchondral marrow edema at the right superior femoral head may reflect an evolving subchondral insufficiency fracture. 2. Mild degenerative changes at the sacroiliac joints, worse on the left without subchondral marrow edema or periarticular/articular erosive change. Findings are similar to prior PET CT imaging  --- End of Report ---.

## 2025-06-12 NOTE — HISTORY OF PRESENT ILLNESS
[Stable] : stable [de-identified] : 79 year old male presents for evaluation of ongoing bilateral lateral hip pain.  S/P L4, L5, and S1 Laminectomy and Removal of Extradural Mass 7/12/22. Last seen in June 2024 for right lateral hip pain.  He underwent trigger point injections with Dr. Gomez in June 2024. He also underwent an ablation in Aug 2024 as well.  He had a left GT bursa injection in Jan with Dr. Marcelino. He also had a caudal ASTER in February and April with Dr. Marcelino. He also had a right SI joint injection with Dr. Marcelino in June.  He has continued pain at the lateral hips. Has numbness/tingling of the legs. He does not take medications for the pain. Has tried PT earlier this year and completed it 3 months ago which helped.  Uses a theragun which helps.  Has MRI Pelvis and MRI Lumbar done in 2023.  PMHx: S/P melanoma surgery of his R shoulder, S/P right hip replacement with Dr. Plascencia in May 2023. No fever chills sweats nausea vomiting no bowel or bladder dysfunction, no recent weight loss or gain no night pain. This history is in addition to the intake form that I personally reviewed.

## 2025-06-13 ENCOUNTER — APPOINTMENT (OUTPATIENT)
Dept: ORTHOPEDIC SURGERY | Facility: CLINIC | Age: 80
End: 2025-06-13
Payer: MEDICARE

## 2025-06-13 ENCOUNTER — OUTPATIENT (OUTPATIENT)
Dept: OUTPATIENT SERVICES | Facility: HOSPITAL | Age: 80
LOS: 1 days | End: 2025-06-13
Payer: MEDICARE

## 2025-06-13 ENCOUNTER — RESULT REVIEW (OUTPATIENT)
Age: 80
End: 2025-06-13

## 2025-06-13 ENCOUNTER — APPOINTMENT (OUTPATIENT)
Dept: RADIOLOGY | Facility: CLINIC | Age: 80
End: 2025-06-13
Payer: MEDICARE

## 2025-06-13 VITALS — HEIGHT: 71 IN | BODY MASS INDEX: 24.78 KG/M2 | WEIGHT: 177 LBS

## 2025-06-13 DIAGNOSIS — Z98.890 OTHER SPECIFIED POSTPROCEDURAL STATES: Chronic | ICD-10-CM

## 2025-06-13 DIAGNOSIS — Z90.5 ACQUIRED ABSENCE OF KIDNEY: Chronic | ICD-10-CM

## 2025-06-13 DIAGNOSIS — M16.12 UNILATERAL PRIMARY OSTEOARTHRITIS, LEFT HIP: ICD-10-CM

## 2025-06-13 PROCEDURE — 27093 INJECTION FOR HIP X-RAY: CPT

## 2025-06-13 PROCEDURE — 73525 CONTRAST X-RAY OF HIP: CPT

## 2025-06-13 PROCEDURE — 27093 INJECTION FOR HIP X-RAY: CPT | Mod: LT

## 2025-06-13 PROCEDURE — 99214 OFFICE O/P EST MOD 30 MIN: CPT | Mod: 25

## 2025-06-13 PROCEDURE — 73525 CONTRAST X-RAY OF HIP: CPT | Mod: 26,LT

## 2025-06-13 PROCEDURE — 73523 X-RAY EXAM HIPS BI 5/> VIEWS: CPT

## 2025-06-13 PROCEDURE — 20610 DRAIN/INJ JOINT/BURSA W/O US: CPT | Mod: RT

## 2025-06-13 NOTE — PHYSICAL EXAM
[de-identified] : Patient is well nourished, well-developed, in no acute distress, with appropriate mood and affect. The patient is oriented to time, place, and person. Respirations are even and unlabored. Gait evaluation does not reveal a limp. There is no inguinal adenopathy. The right limb is well-perfused and showed 2+ dp/pt pulses, without skin lesions, shows a grossly normal motor and sensory examination. Examination of the hip shows a well healed surgical scar. Hip motion is full and painless from 0-90 degrees extension to flexion, 20 degrees adduction and 20 degrees abduction, and 15 degrees internal and 30 degrees external rotation. Leg lengths are approximately equal. Both hips are stable and muscle strength is normal with good strength with resisted abduction and adduction. Pedal pulses are palpable.  Tender to palpation over the lateral aspect of the right hip. The left limb is well-perfused and showed 2+ dp/pt pulses, without skin lesions, shows a grossly normal motor and sensory examination. Hip motion is full and has some pain from 0-90 degrees extension to flexion, 20 degrees adduction and 20 degrees abduction, and 15 degrees internal and 30 degrees external rotation. FADIR and RAJ positive. Leg lengths are approximately equal. Both hips are stable and muscle strength is normal with good strength with resisted abduction and adduction. Pedal pulses are palpable.  [de-identified] : AP pelvis, AP and lateral hip radiographs of the right hip were ordered and obtained in the office and demonstrate satisfactory position and alignment of the components are present. No signs of loosening are seen.  AP and lateral x-rays of the left hip, pelvis, and femur were ordered and taken in the office and demonstrate degenerative joint disease of the hip with joint space narrowing, osteophyte formation, and subchondral sclerosis.

## 2025-06-13 NOTE — DISCUSSION/SUMMARY
[de-identified] : This patient has a well-functioning right total of arthroplasty with right hip trochanteric bursitis and mild left hip osteoarthritis. An extensive discussion was conducted on the natural history of the disease and the variety of surgical and non-surgical options available to the patient including, but not limited to non-steroidal anti-inflammatory medications, steroid injections, physical therapy, maintenance of ideal body weight, and reduction of activity.  Today we performed right trochanteric bursa injection. I rx a L hip intraarticular cortisone injection by IR for the left hip OA. He has a rx for mobic at home and will take this medication and does not need new rx for this today. The patient will schedule an appointment as needed.  Informed consent for the right hip trochanteric bursa injection was obtained. All risks, benefits and alternatives were discussed. These included but were not limited to bleeding, infection, and allergic reaction.  All questions were answered.  A time out was performed. The right lateral hip was prepped and draped in sterile fashion. Using sterile technique, the right greater trochanter was injection with 80mg of Kenalog and 4cc of 0.25% marcaine using a 21-gauge needle. A sterile dressing was applied. Post injection instructions were reviewed. The patient reported relief of symptoms after the injection. The patient tolerated the procedure well.

## 2025-06-13 NOTE — HISTORY OF PRESENT ILLNESS
[de-identified] : This is very nice 78-year-old gentleman experiencing right buttock and right lateral hip pain, which is severe in intensity.  History of right total hip arthroplasty doing very well from that.  No groin pain.  Thrilled with the outcome of that. Hx R hip troch bursitis. Prior injection for this helped.  He has known spinal stenosis being worked up by Dr. Garcia. ASTER do help. Not using cane or walker.

## 2025-06-13 NOTE — REASON FOR VISIT
[Follow-Up Visit] : a follow-up visit for [Artificial Hip Joint] : an artificial hip joint [Hip Pain] : hip pain [Osteoarthritis, Hip] : osteoarthritis of the hip

## 2025-06-16 ENCOUNTER — OFFICE (OUTPATIENT)
Dept: URBAN - METROPOLITAN AREA CLINIC 77 | Facility: CLINIC | Age: 80
Setting detail: OPHTHALMOLOGY
End: 2025-06-16
Payer: MEDICARE

## 2025-06-16 DIAGNOSIS — H25.13: ICD-10-CM

## 2025-06-16 DIAGNOSIS — H16.223: ICD-10-CM

## 2025-06-16 DIAGNOSIS — H43.813: ICD-10-CM

## 2025-06-16 DIAGNOSIS — H35.3121: ICD-10-CM

## 2025-06-16 DIAGNOSIS — H40.013: ICD-10-CM

## 2025-06-16 DIAGNOSIS — H35.033: ICD-10-CM

## 2025-06-16 DIAGNOSIS — H02.825: ICD-10-CM

## 2025-06-16 DIAGNOSIS — H01.001: ICD-10-CM

## 2025-06-16 DIAGNOSIS — H35.3111: ICD-10-CM

## 2025-06-16 DIAGNOSIS — H01.004: ICD-10-CM

## 2025-06-16 PROCEDURE — 92250 FUNDUS PHOTOGRAPHY W/I&R: CPT | Performed by: OPHTHALMOLOGY

## 2025-06-16 PROCEDURE — 92014 COMPRE OPH EXAM EST PT 1/>: CPT | Performed by: OPHTHALMOLOGY

## 2025-06-16 ASSESSMENT — REFRACTION_MANIFEST
OD_CYLINDER: -0.75
OD_ADD: +2.50
OD_SPHERE: +2.50
OD_AXIS: 180
OS_SPHERE: +2.00
OD_SPHERE: +2.25
OD_VA1: 20/20
OD_ADD: +2.50
OS_VA1: 20/25
OD_VA1: 20/25
OS_CYLINDER: -0.25
OS_VA1: 20/20
OS_ADD: +2.50
OS_AXIS: 11
OS_ADD: +2.50
OS_CYLINDER: -0.75
OS_SPHERE: +2.25
OD_AXIS: 178
OD_CYLINDER: -1.00
OS_AXIS: 65

## 2025-06-16 ASSESSMENT — REFRACTION_CURRENTRX
OS_SPHERE: +1.50
OS_ADD: +2.50
OD_ADD: +2.75
OD_AXIS: 090
OD_SPHERE: +1.00
OD_SPHERE: +1.00
OS_CYLINDER: SPHERE
OS_AXIS: 0
OD_AXIS: 090
OS_OVR_VA: 20/
OS_AXIS: 000
OS_ADD: +2.75
OS_SPHERE: +1.50
OD_VPRISM_DIRECTION: BF
OS_CYLINDER: 0.00
OS_VPRISM_DIRECTION: BF
OD_OVR_VA: 20/
OD_CYLINDER: +1.00
OD_ADD: +2.75
OS_OVR_VA: 20/
OS_VPRISM_DIRECTION: BF
OD_OVR_VA: 20/
OD_VPRISM_DIRECTION: BF
OD_CYLINDER: +1.00

## 2025-06-16 ASSESSMENT — KERATOMETRY
OD_K2POWER_DIOPTERS: 40.25
OS_K2POWER_DIOPTERS: 40.00
OS_AXISANGLE_DEGREES: 158
METHOD_AUTO_MANUAL: AUTO
OD_AXISANGLE_DEGREES: 089
OD_K1POWER_DIOPTERS: 40.00
OS_K1POWER_DIOPTERS: 39.25

## 2025-06-16 ASSESSMENT — LID EXAM ASSESSMENTS
OS_BLEPHARITIS: LUL T
OS_COMMENTS: UL EVERTED
OD_BLEPHARITIS: RUL T

## 2025-06-16 ASSESSMENT — REFRACTION_AUTOREFRACTION
OS_SPHERE: +1.50
OS_CYLINDER: +0.75
OS_AXIS: 160
OD_SPHERE: +1.75
OD_CYLINDER: +0.75
OD_AXIS: 071

## 2025-06-16 ASSESSMENT — CONFRONTATIONAL VISUAL FIELD TEST (CVF)
OS_FINDINGS: FULL
OD_FINDINGS: FULL

## 2025-06-16 ASSESSMENT — SUPERFICIAL PUNCTATE KERATITIS (SPK)
OS_SPK: 1+
OD_SPK: 1+

## 2025-06-16 ASSESSMENT — VISUAL ACUITY
OS_BCVA: 20/30
OD_BCVA: 20/30

## 2025-06-17 ENCOUNTER — APPOINTMENT (OUTPATIENT)
Dept: UROLOGY | Facility: CLINIC | Age: 80
End: 2025-06-17
Payer: MEDICARE

## 2025-06-17 VITALS
RESPIRATION RATE: 17 BRPM | BODY MASS INDEX: 24.92 KG/M2 | HEIGHT: 71 IN | HEART RATE: 74 BPM | SYSTOLIC BLOOD PRESSURE: 132 MMHG | WEIGHT: 178 LBS | TEMPERATURE: 99.1 F | DIASTOLIC BLOOD PRESSURE: 77 MMHG

## 2025-06-17 PROCEDURE — 51798 US URINE CAPACITY MEASURE: CPT

## 2025-06-17 PROCEDURE — 99213 OFFICE O/P EST LOW 20 MIN: CPT

## 2025-07-12 NOTE — PHYSICAL THERAPY INITIAL EVALUATION ADULT - PHYSICAL ASSIST/NONPHYSICAL ASSIST: STAIR NEGOTIATION, REHAB EVAL
verbal cues/nonverbal cues (demo/gestures)/1 person assist
Detail Level: Generalized
Detail Level: Zone
Detail Level: Detailed

## 2025-07-16 NOTE — REASON FOR VISIT
[Home] : at home, [unfilled] , at the time of the visit. [Medical Office: ([facility])___] : at the medical office located in  [Telehealth (audio & video)] : This visit was provided via telehealth using real-time 2-way audio visual technology. [Verbal consent obtained from patient] : the patient, [unfilled] [Follow-Up Visit] : a follow-up visit for

## 2025-07-16 NOTE — REASON FOR VISIT
[Home] : at home, [unfilled] , at the time of the visit. [Medical Office: ([facility])___] : at the medical office located in  [Telehealth (audio & video)] : This visit was provided via telehealth using real-time 2-way audio visual technology. [Verbal consent obtained from patient] : the patient, [unfilled] [Follow-Up Visit] : a follow-up visit for

## 2025-07-18 ENCOUNTER — APPOINTMENT (OUTPATIENT)
Dept: ORTHOPEDIC SURGERY | Facility: CLINIC | Age: 80
End: 2025-07-18
Payer: MEDICARE

## 2025-07-18 PROCEDURE — 99214 OFFICE O/P EST MOD 30 MIN: CPT | Mod: 2W

## 2025-07-18 NOTE — PHYSICAL EXAM
[Normal] : Gait: normal [Jeronimo's Sign] : negative Jeronimo's sign [Pronator Drift] : negative pronator drift [SLR] : negative straight leg raise [de-identified] : 5 out of 5 motor strength, sensation is intact and symmetrical full range of motion flexion extension and rotation, no palpatory tenderness full range of motion of hips knees shoulders and elbows (all four extremities), no atrophy, negative straight leg raise, no pathological reflexes, no swelling, normal ambulation, no apparent distress skin is intact, no palpable lymph nodes, no upper or lower extremity instability, alert and oriented x3 and normal mood. Normal finger-to nose test.  Pain over right troch region.  [de-identified] :  I reviewed, interpreted and clinically correlated the following outside imaging studies, MRI Lumbar 6/26/25 ([facility], [place]) - L5-S1 mild disc bulge causing moderate left and mild right foraminal narrowing with bilateral facet joint arthropathy, no central canal stenosis. L4-5 mild disc bulge with bilateral facet joint arthropathy, mild ligamentum flavum hypertrophy, mild to moderate right neural foraminal narrowing with abutment of right L5 traversing nerve root, and mild left foraminal narrowing. L3-4 mild disc bulges with mild ligamentum flavum hypertrophy, and facet arthropathy causing mild central canal stenosis and mild bilateral foraminal stenosis. L2-3 mild diffuse disc bulge with Grade 1 retrolisthesis of L2 over L3, mild central canal stenosis, narrowing of the right lateral recess abutting the right L3 traversing nerve root, and mild bilateral foraminal narrowing. L1-2 mild ligamentum flavum hypertrophy and facet arthropathy with mild central canal stenosis; no significant foraminal stenosis. Laminectomy changes at L4-5 and L5-S1 levels. The findings appear stable as compared to the previous study.    MRI Lumbar 12/29/23 ([facility], [place]) - degenerative spondylosis. Post treatment changes of laminectomy at L4-5 and L5-S1. Lesion in L5, may represent atypical hemangioma.  MR PELVIS BONY ONLY - ORDERED BY: ARON CRUZ   PROCEDURE DATE: 03/16/2023    INTERPRETATION: MR PELVIS BONY dated 3/16/2023 8:35 AM  INDICATION: Lower back pain.  COMPARISON: PET/CT dated 9/8/2022.  TECHNIQUE: Multi-sequential, multiplanar MRI imaging of the pelvis with focus on the sacroiliac joints was performed per standard protocol.  FINDINGS:  BONE MARROW: Mild productive changes are seen at the bilateral sacral iliac joints with osseous bridging on the left. No erosive changes seen.. No subchondral marrow edema or erosive changes appreciated within the sacral iliac joints. The pubic symphysis is preserved. There is moderate to severe cartilage loss at the right superior joint space with a small focal subchondral marrow edema at the right superior femoral head. Marginal productive changes are seen bilaterally. Small subchondral cysts are seen in the left axial femoral head region. A rounded focus of increased T1 and T2 signal is appreciated in the posterior L5 vertebra measuring to 1.3 cm compatible with hemangioma. L5 laminectomy with L5-S1 facet arthropathy and small facet effusions.. SYNOVIUM/ JOINT FLUID: Moderate to large right and mild to moderate left hip joint effusion. No fluid appreciated in the sacroiliac joints. TENDONS: Preserved tendons MUSCLES: Preserved musculature NEUROVASCULAR STRUCTURES: Preserved. SUBCUTANEOUS SOFT TISSUES: No soft tissue swelling. INTRAPELVIC SOFT TISSUES: Enlarged prostate invaginating into the inferior urinary bladder. Small right fat-containing inguinal hernia. Scattered colonic diverticula.  IMPRESSION:  1. Severe right and moderate left hip arthrosis. Focus of subchondral marrow edema at the right superior femoral head may reflect an evolving subchondral insufficiency fracture. 2. Mild degenerative changes at the sacroiliac joints, worse on the left without subchondral marrow edema or periarticular/articular erosive change. Findings are similar to prior PET CT imaging  --- End of Report ---.

## 2025-07-18 NOTE — HISTORY OF PRESENT ILLNESS
[Stable] : stable [de-identified] : 79 year old male presents for evaluation of ongoing bilateral lateral hip pain.  S/P L4, L5, and S1 Laminectomy and Removal of Extradural Mass 7/12/22. He underwent trigger point injections with Dr. Gomez in June 2024. He also underwent an ablation in Aug 2024 as well.  He had a left GT bursa injection in Jan with Dr. Marcelino. He also had a caudal ASTER in February and April with Dr. Marcelino. He also had a right SI joint injection with Dr. Marcelino in June.  He has continued pain at the lateral hips. Has numbness/tingling of the legs. He does not take medications for the pain. Has tried PT earlier this year and completed it 3 months ago which helped.  Uses a theragun which helps.  Presents today for MRI Lumbar review.  PMHx: S/P melanoma surgery of his R shoulder, S/P right hip replacement with Dr. Plascencia in May 2023. No fever chills sweats nausea vomiting no bowel or bladder dysfunction, no recent weight loss or gain no night pain. This history is in addition to the intake form that I personally reviewed.

## 2025-07-18 NOTE — PHYSICAL EXAM
[Normal] : Gait: normal [Jeronimo's Sign] : negative Jeronimo's sign [Pronator Drift] : negative pronator drift [SLR] : negative straight leg raise [de-identified] : 5 out of 5 motor strength, sensation is intact and symmetrical full range of motion flexion extension and rotation, no palpatory tenderness full range of motion of hips knees shoulders and elbows (all four extremities), no atrophy, negative straight leg raise, no pathological reflexes, no swelling, normal ambulation, no apparent distress skin is intact, no palpable lymph nodes, no upper or lower extremity instability, alert and oriented x3 and normal mood. Normal finger-to nose test.  Pain over right troch region.  [de-identified] :  I reviewed, interpreted and clinically correlated the following outside imaging studies, MRI Lumbar 6/26/25 ([facility], [place]) - L5-S1 mild disc bulge causing moderate left and mild right foraminal narrowing with bilateral facet joint arthropathy, no central canal stenosis. L4-5 mild disc bulge with bilateral facet joint arthropathy, mild ligamentum flavum hypertrophy, mild to moderate right neural foraminal narrowing with abutment of right L5 traversing nerve root, and mild left foraminal narrowing. L3-4 mild disc bulges with mild ligamentum flavum hypertrophy, and facet arthropathy causing mild central canal stenosis and mild bilateral foraminal stenosis. L2-3 mild diffuse disc bulge with Grade 1 retrolisthesis of L2 over L3, mild central canal stenosis, narrowing of the right lateral recess abutting the right L3 traversing nerve root, and mild bilateral foraminal narrowing. L1-2 mild ligamentum flavum hypertrophy and facet arthropathy with mild central canal stenosis; no significant foraminal stenosis. Laminectomy changes at L4-5 and L5-S1 levels. The findings appear stable as compared to the previous study.    MRI Lumbar 12/29/23 ([facility], [place]) - degenerative spondylosis. Post treatment changes of laminectomy at L4-5 and L5-S1. Lesion in L5, may represent atypical hemangioma.  MR PELVIS BONY ONLY - ORDERED BY: ARON CRUZ   PROCEDURE DATE: 03/16/2023    INTERPRETATION: MR PELVIS BONY dated 3/16/2023 8:35 AM  INDICATION: Lower back pain.  COMPARISON: PET/CT dated 9/8/2022.  TECHNIQUE: Multi-sequential, multiplanar MRI imaging of the pelvis with focus on the sacroiliac joints was performed per standard protocol.  FINDINGS:  BONE MARROW: Mild productive changes are seen at the bilateral sacral iliac joints with osseous bridging on the left. No erosive changes seen.. No subchondral marrow edema or erosive changes appreciated within the sacral iliac joints. The pubic symphysis is preserved. There is moderate to severe cartilage loss at the right superior joint space with a small focal subchondral marrow edema at the right superior femoral head. Marginal productive changes are seen bilaterally. Small subchondral cysts are seen in the left axial femoral head region. A rounded focus of increased T1 and T2 signal is appreciated in the posterior L5 vertebra measuring to 1.3 cm compatible with hemangioma. L5 laminectomy with L5-S1 facet arthropathy and small facet effusions.. SYNOVIUM/ JOINT FLUID: Moderate to large right and mild to moderate left hip joint effusion. No fluid appreciated in the sacroiliac joints. TENDONS: Preserved tendons MUSCLES: Preserved musculature NEUROVASCULAR STRUCTURES: Preserved. SUBCUTANEOUS SOFT TISSUES: No soft tissue swelling. INTRAPELVIC SOFT TISSUES: Enlarged prostate invaginating into the inferior urinary bladder. Small right fat-containing inguinal hernia. Scattered colonic diverticula.  IMPRESSION:  1. Severe right and moderate left hip arthrosis. Focus of subchondral marrow edema at the right superior femoral head may reflect an evolving subchondral insufficiency fracture. 2. Mild degenerative changes at the sacroiliac joints, worse on the left without subchondral marrow edema or periarticular/articular erosive change. Findings are similar to prior PET CT imaging  --- End of Report ---.

## 2025-07-18 NOTE — DISCUSSION/SUMMARY
[de-identified] : S/P L4, L5, and S1 Laminectomy and Removal of Extradural Mass 7/12/22. Discussed all options. Right trochanteric bursitis.  Lumbar degenerative disc disease. Diclofenac PRN. All options discussed including rest, medicine, home exercise, acupuncture, Chiropractic care, Physical Therapy, Pain management, and last resort surgery. All questions were answered, all alternatives discussed and the patient is in complete agreement with the treatment plan which the patient contributed to and discussed with me through the shared decision making process. Follow-up appointment as instructed. Any issues and the patient will call or come in sooner.

## 2025-07-18 NOTE — HISTORY OF PRESENT ILLNESS
[Stable] : stable [de-identified] : 79 year old male presents for evaluation of ongoing bilateral lateral hip pain.  S/P L4, L5, and S1 Laminectomy and Removal of Extradural Mass 7/12/22. He underwent trigger point injections with Dr. Gomez in June 2024. He also underwent an ablation in Aug 2024 as well.  He had a left GT bursa injection in Jan with Dr. Marcelino. He also had a caudal ASTER in February and April with Dr. Marcelino. He also had a right SI joint injection with Dr. Marcelino in June.  He has continued pain at the lateral hips. Has numbness/tingling of the legs. He does not take medications for the pain. Has tried PT earlier this year and completed it 3 months ago which helped.  Uses a theragun which helps.  Presents today for MRI Lumbar review.  PMHx: S/P melanoma surgery of his R shoulder, S/P right hip replacement with Dr. Plascencia in May 2023. No fever chills sweats nausea vomiting no bowel or bladder dysfunction, no recent weight loss or gain no night pain. This history is in addition to the intake form that I personally reviewed.

## 2025-07-18 NOTE — DISCUSSION/SUMMARY
[de-identified] : S/P L4, L5, and S1 Laminectomy and Removal of Extradural Mass 7/12/22. Discussed all options. Right trochanteric bursitis.  Lumbar degenerative disc disease. Diclofenac PRN. All options discussed including rest, medicine, home exercise, acupuncture, Chiropractic care, Physical Therapy, Pain management, and last resort surgery. All questions were answered, all alternatives discussed and the patient is in complete agreement with the treatment plan which the patient contributed to and discussed with me through the shared decision making process. Follow-up appointment as instructed. Any issues and the patient will call or come in sooner.

## (undated) DEVICE — VENODYNE/SCD SLEEVE CALF LARGE

## (undated) DEVICE — LIJ-KMEDIC KERRISON RONGUER: Type: DURABLE MEDICAL EQUIPMENT

## (undated) DEVICE — DRSG STERISTRIPS 0.25 X 3"

## (undated) DEVICE — DRAIN JACKSON PRATT 3 SPRING RESERVOIR W 10FR PVC DRAIN

## (undated) DEVICE — SUT PDO 2 1/2 CIRCLE 40MM NDL 45CM

## (undated) DEVICE — CANISTER DISPOSABLE THIN WALL 3000CC

## (undated) DEVICE — DRAPE LAPAROTOMY W VELCRO CORD TABS

## (undated) DEVICE — GLV 7.5 PROTEXIS (WHITE)

## (undated) DEVICE — PACK MINOR NO DRAPE

## (undated) DEVICE — SOL INJ NS 0.9% 1000ML

## (undated) DEVICE — SOL IRR POUR H2O 1000ML

## (undated) DEVICE — SOL IRR POUR NS 0.9% 500ML

## (undated) DEVICE — PROTECTOR HEEL / ELBOW FLUFFY

## (undated) DEVICE — BIPOLAR FORCEP STRYKER STANDARD 9" X 1MM (YELLOW)

## (undated) DEVICE — SPONGE DISSECTOR PEANUT

## (undated) DEVICE — SOL IRR POUR H2O 500ML

## (undated) DEVICE — NDL SPINAL 18G X 3.5" (PINK)

## (undated) DEVICE — POSITIONER STRAP ARMBOARD VELCRO TS-30

## (undated) DEVICE — DRSG DERMABOND 0.7ML

## (undated) DEVICE — Device

## (undated) DEVICE — GOWN XL

## (undated) DEVICE — SUT MONOCRYL 3-0 27" PS-2 UNDYED

## (undated) DEVICE — DRSG STERISTRIPS 0.5 X 4"

## (undated) DEVICE — SUT QUILL MONODERM 0 1/2 CIRCLE TAPR 45CM 26MM

## (undated) DEVICE — VENODYNE/SCD SLEEVE CALF MEDIUM

## (undated) DEVICE — SUT VICRYL 0 27" OS-6 UNDYED

## (undated) DEVICE — DRSG TEGADERM 4X4.75"

## (undated) DEVICE — DRAPE 3/4 SHEET 52X76"

## (undated) DEVICE — ELCTR 4-DISC 20MM 49" (RED, BLUE, GREEN, BLACK)

## (undated) DEVICE — NDL HYPO SAFE 21G X 1.5" (GREEN)

## (undated) DEVICE — SUT QUILL MONODERM 2-0 3/8 CIRCLE 45CM

## (undated) DEVICE — DRAPE BACK TABLE COVER 44X90"

## (undated) DEVICE — DURABLE MEDICAL EQUIPMENT: Type: DURABLE MEDICAL EQUIPMENT

## (undated) DEVICE — DRSG BIOPATCH DISK W CHG 3/4" W 1.5MM HOLE

## (undated) DEVICE — SUT MONOCRYL 2-0 27" SH UNDYED

## (undated) DEVICE — ELCTR BOVIE PENCIL SMOKE EVACUATION

## (undated) DEVICE — STRYKER INTERPULSE HANDPIECE W IRR SUCTION TUBE

## (undated) DEVICE — GLV 8.5 PROTEXIS (BLUE)

## (undated) DEVICE — ELCTR BOVIE PENCIL BLADE 10FT

## (undated) DEVICE — POSITIONER CUSHION INSERT PRONE VIEW LG

## (undated) DEVICE — HOOD FLYTE STRYKER HELMET SHIELD

## (undated) DEVICE — DRAPE SURGICAL #1010

## (undated) DEVICE — GLV 8.5 PROTEXIS (WHITE)

## (undated) DEVICE — DRAPE LAPAROTOMY TRANSVERSE

## (undated) DEVICE — SUT VICRYL 1 36" CTX UNDYED

## (undated) DEVICE — SYR ASEPTO

## (undated) DEVICE — MIDAS REX LEGEND LUBRICANT DIFFUSER CARTRIDGE

## (undated) DEVICE — SOL INJ NS 0.9% 500ML 1-PORT

## (undated) DEVICE — MIDAS REX LEGEND BALL FLUTED SM BORE 4.0MM X 10CM

## (undated) DEVICE — SAW BLADE STRYKER DUAL CUT 1.27X11 X90MM

## (undated) DEVICE — FOLEY TRAY 16FR 5CC LF UMETER CLOSED

## (undated) DEVICE — SUT VICRYL 2-0 27" FS-1 UNDYED

## (undated) DEVICE — DRSG TELFA 3 X 8

## (undated) DEVICE — LABELS BLANK W PEN

## (undated) DEVICE — PREP DURAPREP 26CC

## (undated) DEVICE — PACK DAA HIP

## (undated) DEVICE — PACK NEURO MINOR

## (undated) DEVICE — ELCTR SUBDERMAL NDL 27G X 1/2" WITH TWISTED PAIR

## (undated) DEVICE — SPONGE X-RAY 4X8"

## (undated) DEVICE — TUBING SUCTION NONCONDUCTIVE 6MM X 12FT

## (undated) DEVICE — DRAPE INSTRUMENT POUCH 6.75" X 11"

## (undated) DEVICE — CAM-ESU 1501291: Type: DURABLE MEDICAL EQUIPMENT

## (undated) DEVICE — DRSG BENZOIN 0.6CC

## (undated) DEVICE — ELCTR GROUNDING PAD ADULT COVIDIEN

## (undated) DEVICE — TUBING TUR 2 PRONG

## (undated) DEVICE — SUT VICRYL 3-0 27" SH UNDYED

## (undated) DEVICE — DRSG CURITY GAUZE SPONGE 4 X 4" 12-PLY

## (undated) DEVICE — TAPE SILK 3"

## (undated) DEVICE — DRAPE SUPINE ESYSUIT

## (undated) DEVICE — ELCTR SUBDERMAL NDL CLASSIC 1.5M X 59" (6 COLOR)

## (undated) DEVICE — SUT MONOCRYL 4-0 27" PS-2 UNDYED

## (undated) DEVICE — WARMING BLANKET FULL ADULT

## (undated) DEVICE — GLV 8 PROTEXIS (CREAM) NEU-THERA

## (undated) DEVICE — POSITIONER PATIENT SAFETY STRAP 3X60"

## (undated) DEVICE — HOOD FLYTE STRYKER SURGICOOL W PEELAWAY

## (undated) DEVICE — DRSG DERMABOND MINI

## (undated) DEVICE — GLV 8 PROTEXIS (WHITE)

## (undated) DEVICE — SUT STRATAFIX SYMMETRIC PDS PLUS 1 18" CTX VIOLET

## (undated) DEVICE — SOL BETADINE POUCH 0.75OZ STERILE

## (undated) DEVICE — SOL IRR BAG NS 0.9% 1000ML

## (undated) DEVICE — WARMING BLANKET UPPER ADULT

## (undated) DEVICE — DRSG AQUACEL 3.5 X 10"

## (undated) DEVICE — LIGHT PIPE

## (undated) DEVICE — GLV 7 PROTEXIS (CREAM) NEU-THERA

## (undated) DEVICE — DRSG TEGADERM 1.75X1.75"